# Patient Record
Sex: FEMALE | Race: WHITE | NOT HISPANIC OR LATINO | Employment: OTHER | URBAN - METROPOLITAN AREA
[De-identification: names, ages, dates, MRNs, and addresses within clinical notes are randomized per-mention and may not be internally consistent; named-entity substitution may affect disease eponyms.]

---

## 2018-09-17 DIAGNOSIS — Z12.11 SCREENING FOR COLON CANCER: Primary | ICD-10-CM

## 2018-09-26 ENCOUNTER — OUTSIDE FACILITY SERVICE (OUTPATIENT)
Dept: GASTROENTEROLOGY | Facility: CLINIC | Age: 63
End: 2018-09-26

## 2018-09-26 PROCEDURE — G0121 COLON CA SCRN NOT HI RSK IND: HCPCS | Performed by: INTERNAL MEDICINE

## 2018-11-02 ENCOUNTER — TRANSCRIBE ORDERS (OUTPATIENT)
Dept: ADMINISTRATIVE | Facility: HOSPITAL | Age: 63
End: 2018-11-02

## 2018-11-02 DIAGNOSIS — R07.89 OTHER CHEST PAIN: Primary | ICD-10-CM

## 2018-11-02 DIAGNOSIS — N64.4 BREAST TENDERNESS: ICD-10-CM

## 2018-11-07 ENCOUNTER — HOSPITAL ENCOUNTER (OUTPATIENT)
Dept: CT IMAGING | Facility: HOSPITAL | Age: 63
Discharge: HOME | End: 2018-11-07
Attending: INTERNAL MEDICINE | Admitting: INTERNAL MEDICINE

## 2018-11-07 DIAGNOSIS — R07.89 OTHER CHEST PAIN: ICD-10-CM

## 2018-11-07 DIAGNOSIS — N64.4 BREAST TENDERNESS: ICD-10-CM

## 2018-11-07 LAB — CREAT BLDA-MCNC: 0.9 MG/DL (ref 0.6–1.3)

## 2018-11-07 PROCEDURE — 25010000002 IOPAMIDOL 61 % SOLUTION: Performed by: INTERNAL MEDICINE

## 2018-11-07 PROCEDURE — 71270 CT THORAX DX C-/C+: CPT

## 2018-11-07 PROCEDURE — 82565 ASSAY OF CREATININE: CPT

## 2018-11-07 RX ADMIN — IOPAMIDOL 100 ML: 612 INJECTION, SOLUTION INTRAVENOUS at 10:53

## 2019-03-04 RX ORDER — ESCITALOPRAM OXALATE 10 MG/1
TABLET ORAL
Qty: 90 TABLET | Refills: 1 | Status: SHIPPED | OUTPATIENT
Start: 2019-03-04 | End: 2019-09-06 | Stop reason: SDUPTHER

## 2019-04-08 ENCOUNTER — OFFICE VISIT (OUTPATIENT)
Dept: INTERNAL MEDICINE | Facility: CLINIC | Age: 64
End: 2019-04-08

## 2019-04-08 ENCOUNTER — TELEPHONE (OUTPATIENT)
Dept: INTERNAL MEDICINE | Facility: CLINIC | Age: 64
End: 2019-04-08

## 2019-04-08 VITALS
WEIGHT: 245 LBS | SYSTOLIC BLOOD PRESSURE: 118 MMHG | DIASTOLIC BLOOD PRESSURE: 80 MMHG | HEART RATE: 84 BPM | TEMPERATURE: 98.5 F

## 2019-04-08 DIAGNOSIS — J40 BRONCHITIS: ICD-10-CM

## 2019-04-08 DIAGNOSIS — R05.9 COUGH: Primary | ICD-10-CM

## 2019-04-08 PROBLEM — R73.09 ABNORMAL GLUCOSE: Status: ACTIVE | Noted: 2019-04-08

## 2019-04-08 PROBLEM — E11.40 DIABETIC NEUROPATHY ASSOCIATED WITH TYPE 2 DIABETES MELLITUS (HCC): Status: ACTIVE | Noted: 2019-04-08

## 2019-04-08 PROBLEM — I10 BENIGN ESSENTIAL HYPERTENSION: Status: ACTIVE | Noted: 2019-04-08

## 2019-04-08 PROBLEM — E11.9 DIABETES MELLITUS: Status: ACTIVE | Noted: 2019-04-08

## 2019-04-08 PROBLEM — M25.521 RIGHT ELBOW PAIN: Status: ACTIVE | Noted: 2019-04-08

## 2019-04-08 PROBLEM — E66.01 MORBID OBESITY (HCC): Status: ACTIVE | Noted: 2019-04-08

## 2019-04-08 PROBLEM — E78.2 MIXED HYPERLIPIDEMIA: Status: ACTIVE | Noted: 2019-04-08

## 2019-04-08 PROBLEM — K21.9 GERD (GASTROESOPHAGEAL REFLUX DISEASE): Status: ACTIVE | Noted: 2019-04-08

## 2019-04-08 PROBLEM — I87.2 VENOUS (PERIPHERAL) INSUFFICIENCY: Status: ACTIVE | Noted: 2019-04-08

## 2019-04-08 PROBLEM — J30.9 ALLERGIC RHINITIS: Status: ACTIVE | Noted: 2019-04-08

## 2019-04-08 LAB
EXPIRATION DATE: NORMAL
FLUAV AG NPH QL: NEGATIVE
FLUBV AG NPH QL: NEGATIVE
INTERNAL CONTROL: NORMAL
Lab: NORMAL

## 2019-04-08 PROCEDURE — 99213 OFFICE O/P EST LOW 20 MIN: CPT | Performed by: NURSE PRACTITIONER

## 2019-04-08 PROCEDURE — 87804 INFLUENZA ASSAY W/OPTIC: CPT | Performed by: NURSE PRACTITIONER

## 2019-04-08 RX ORDER — DOCUSATE SODIUM 100 MG/1
1 CAPSULE, LIQUID FILLED ORAL DAILY PRN
Status: ON HOLD | COMMUNITY
End: 2019-12-03 | Stop reason: SDUPTHER

## 2019-04-08 RX ORDER — ALBUTEROL SULFATE 90 UG/1
2 AEROSOL, METERED RESPIRATORY (INHALATION) EVERY 4 HOURS PRN
Qty: 1 INHALER | Refills: 0 | Status: SHIPPED | OUTPATIENT
Start: 2019-04-08 | End: 2019-05-05 | Stop reason: SDUPTHER

## 2019-04-08 RX ORDER — BENZONATATE 100 MG/1
200 CAPSULE ORAL 3 TIMES DAILY PRN
Qty: 30 CAPSULE | Refills: 1 | Status: SHIPPED | OUTPATIENT
Start: 2019-04-08 | End: 2019-04-19 | Stop reason: SDUPTHER

## 2019-04-08 RX ORDER — LISINOPRIL 10 MG/1
10 TABLET ORAL DAILY
Refills: 3 | COMMUNITY
Start: 2019-01-23 | End: 2019-10-20 | Stop reason: SDUPTHER

## 2019-04-08 RX ORDER — MELATONIN
2 EVERY MORNING
COMMUNITY
Start: 2014-08-27

## 2019-04-08 RX ORDER — LORATADINE 10 MG/1
1 TABLET ORAL DAILY
COMMUNITY
Start: 2012-02-10

## 2019-04-08 RX ORDER — PANTOPRAZOLE SODIUM 40 MG/1
40 TABLET, DELAYED RELEASE ORAL 2 TIMES DAILY
Refills: 1 | COMMUNITY
Start: 2019-02-27 | End: 2019-05-27 | Stop reason: SDUPTHER

## 2019-04-08 RX ORDER — ATORVASTATIN CALCIUM 40 MG/1
1 TABLET, FILM COATED ORAL DAILY
Refills: 1 | COMMUNITY
Start: 2019-02-27 | End: 2019-05-27 | Stop reason: SDUPTHER

## 2019-04-08 RX ORDER — GUAIFENESIN AND DEXTROMETHORPHAN HYDROBROMIDE 1200; 60 MG/1; MG/1
TABLET, EXTENDED RELEASE ORAL AS NEEDED
COMMUNITY
Start: 2014-03-14

## 2019-04-08 NOTE — PROGRESS NOTES
Wallace Dukes  1955  [id number]  Patient Care Team:  Domingo Sood MD as PCP - General  Domingo Sood MD as PCP - Family Medicine    Wallace Dukes is a pleasant 63 y.o. female who presents for evaluation of URI (productive cough, congestion, low grade fever )      Chief Complaint   Patient presents with   • URI     productive cough, congestion, low grade fever        HPI:   Cough dry deep hacking, sore throat, bilat rib pain, dec energy and appetite fever. sudden onset Sat.  Non smoker, no lung dx. daytime Mucinex contains acetaminophen.  Started mucinex Sat.     Past Medical History:   Diagnosis Date   • Cancer of breast (CMS/HCC) 2014   • Chest pain 2009    stress nuclear   • MRSA (methicillin resistant Staphylococcus aureus)     infection of right breast expander; bilateral removal of expanders   • Schwannoma 06/2016    left translabyrinthine craniotomy       Past Surgical History:   Procedure Laterality Date   • BREAST RECONSTRUCTION, BREAST TISSUE EXPANDER INSERTION Bilateral 07/2013   • BREAST TISSUE EXPANDER REMOVAL INSERTION OF IMPLANT Bilateral     bilateral removal of expanders   • CRANIOTOMY FOR TUMOR  06/2016   • HYSTERECTOMY  1986   • MASTECTOMY Bilateral 2014       Family History   Problem Relation Age of Onset   • Hypertension Mother    • Diabetes Mother    • Migraines Mother    • Diabetes Sister    • Hypertension Brother    • Diabetes Son        Social History     Tobacco Use   Smoking Status Never Smoker   Smokeless Tobacco Never Used       No Known Allergies    Review of Systems   Constitutional: Positive for chills. Negative for fatigue and fever.   HENT: Positive for congestion. Negative for ear pain and sinus pressure.    Respiratory: Positive for cough and wheezing. Negative for chest tightness and shortness of breath.    Cardiovascular: Negative for chest pain and palpitations.   Gastrointestinal: Negative for abdominal pain, blood in stool and constipation.   Skin:  Negative for color change.   Allergic/Immunologic: Positive for environmental allergies.   Neurological: Negative for dizziness, speech difficulty and headache.   Psychiatric/Behavioral: Negative for decreased concentration. The patient is not nervous/anxious.        Vitals:    04/08/19 1635   BP: 118/80   BP Location: Left arm   Patient Position: Sitting   Cuff Size: Large Adult   Pulse: 84   Temp: 98.5 °F (36.9 °C)   TempSrc: Temporal   Weight: 111 kg (245 lb)   PainSc: 0-No pain         Current Outpatient Medications:   •  atorvastatin (LIPITOR) 40 MG tablet, Take 1 tablet by mouth Daily., Disp: , Rfl: 1  •  cholecalciferol (VITAMIN D3) 1000 units tablet, Take 3 tablets by mouth Every Morning., Disp: , Rfl:   •  dextromethorphan-guaifenesin (MUCINEX DM MAX STRENGTH)  MG per 12 hr tablet, Take 1 tablet by mouth Every 12 (Twelve) Hours., Disp: , Rfl:   •  docusate sodium (STOOL SOFTENER) 100 MG capsule, Take 1 capsule by mouth Daily As Needed., Disp: , Rfl:   •  escitalopram (LEXAPRO) 10 MG tablet, TAKE 1 TABLET BY ORAL ROUTE EVERY DAY, Disp: 90 tablet, Rfl: 1  •  lisinopril (PRINIVIL,ZESTRIL) 10 MG tablet, Take 10 mg by mouth Daily., Disp: , Rfl: 3  •  loratadine (CLARITIN) 10 MG tablet, Take 1 tablet by mouth Daily., Disp: , Rfl:   •  metFORMIN (GLUCOPHAGE) 500 MG tablet, TAKE 1 TABLET BY ORAL ROUTE 2 TIMES EVERY DAY WITH MORNING AND EVENING MEALS, Disp: 180 tablet, Rfl: 1  •  Multiple Vitamins-Minerals (MULTIVITAMIN ADULT PO), Take 1 tablet by mouth Daily., Disp: , Rfl:   •  pantoprazole (PROTONIX) 40 MG EC tablet, Take 40 mg by mouth 2 (Two) Times a Day., Disp: , Rfl: 1  •  albuterol sulfate  (90 Base) MCG/ACT inhaler, Inhale 2 puffs Every 4 (Four) Hours As Needed for Wheezing or Shortness of Air., Disp: 1 inhaler, Rfl: 0  •  benzonatate (TESSALON PERLES) 100 MG capsule, Take 2 capsules by mouth 3 (Three) Times a Day As Needed for Cough., Disp: 30 capsule, Rfl: 1    Physical Exam   Constitutional:  She appears well-developed and well-nourished.   HENT:   Head: Normocephalic and atraumatic.   Right Ear: External ear normal.   Left Ear: External ear normal.   Mouth/Throat: Oropharynx is clear and moist.   Eyes: Conjunctivae and EOM are normal.   Neck: Normal range of motion. Neck supple.   Cardiovascular: Normal rate, regular rhythm and normal heart sounds.   Pulmonary/Chest: Effort normal. She has decreased breath sounds in the right upper field and the right lower field. She has wheezes.   Abdominal: Soft. Bowel sounds are normal.   Musculoskeletal: Normal range of motion.   Lymphadenopathy:     She has no cervical adenopathy.   Neurological: She is alert.   Skin: Skin is warm and dry.   Psychiatric: She has a normal mood and affect. Her behavior is normal. Thought content normal.           Assessment/Plan:    Problem List Items Addressed This Visit     None      Visit Diagnoses     Cough    -  Primary    Relevant Orders    POC Influenza A / B (Completed)    Bronchitis        Relevant Medications    loratadine (CLARITIN) 10 MG tablet    dextromethorphan-guaifenesin (MUCINEX DM MAX STRENGTH)  MG per 12 hr tablet    albuterol sulfate  (90 Base) MCG/ACT inhaler    benzonatate (TESSALON PERLES) 100 MG capsule    Other Relevant Orders    XR Chest PA & Lateral          Plan of care reviewed with patient at the conclusion of today's visit. Education was provided regarding diagnosis, management and any prescribed or recommended OTC medications.  Patient verbalizes understanding of and agreement with management plan.    Return in about 4 days (around 4/12/2019).    *Note that portions of this note were completed with a voice recognition program.  Efforts were made to edit the dictation but occasionally words are transcribed.    MARVIN Pat

## 2019-04-08 NOTE — TELEPHONE ENCOUNTER
Patient complains of deep dry cough, sore rib area, sore throat and fever of 101.1 for 2 days.    Prefers not to come in

## 2019-04-09 ENCOUNTER — HOSPITAL ENCOUNTER (OUTPATIENT)
Dept: GENERAL RADIOLOGY | Facility: HOSPITAL | Age: 64
Discharge: HOME | End: 2019-04-09
Admitting: NURSE PRACTITIONER

## 2019-04-09 DIAGNOSIS — J40 BRONCHITIS: ICD-10-CM

## 2019-04-09 PROCEDURE — 71046 X-RAY EXAM CHEST 2 VIEWS: CPT

## 2019-04-12 ENCOUNTER — OFFICE VISIT (OUTPATIENT)
Dept: INTERNAL MEDICINE | Facility: CLINIC | Age: 64
End: 2019-04-12

## 2019-04-12 VITALS
WEIGHT: 244 LBS | SYSTOLIC BLOOD PRESSURE: 148 MMHG | TEMPERATURE: 98.5 F | DIASTOLIC BLOOD PRESSURE: 86 MMHG | HEART RATE: 80 BPM

## 2019-04-12 DIAGNOSIS — J40 BRONCHITIS: Primary | ICD-10-CM

## 2019-04-12 PROCEDURE — 96372 THER/PROPH/DIAG INJ SC/IM: CPT | Performed by: NURSE PRACTITIONER

## 2019-04-12 PROCEDURE — 99213 OFFICE O/P EST LOW 20 MIN: CPT | Performed by: NURSE PRACTITIONER

## 2019-04-12 RX ORDER — CLARITHROMYCIN 500 MG/1
500 TABLET, COATED ORAL 2 TIMES DAILY
Qty: 14 TABLET | Refills: 0 | Status: SHIPPED | OUTPATIENT
Start: 2019-04-12 | End: 2019-04-19

## 2019-04-12 RX ORDER — TRIAMCINOLONE ACETONIDE 40 MG/ML
80 INJECTION, SUSPENSION INTRA-ARTICULAR; INTRAMUSCULAR ONCE
Status: COMPLETED | OUTPATIENT
Start: 2019-04-12 | End: 2019-04-12

## 2019-04-12 RX ORDER — PREDNISONE 20 MG/1
20 TABLET ORAL DAILY
Qty: 5 TABLET | Refills: 0 | Status: SHIPPED | OUTPATIENT
Start: 2019-04-12 | End: 2019-04-19

## 2019-04-12 RX ORDER — TRIAMCINOLONE ACETONIDE 40 MG/ML
40 INJECTION, SUSPENSION INTRA-ARTICULAR; INTRAMUSCULAR ONCE
Status: DISCONTINUED | OUTPATIENT
Start: 2019-04-12 | End: 2019-04-12

## 2019-04-12 RX ADMIN — TRIAMCINOLONE ACETONIDE 80 MG: 40 INJECTION, SUSPENSION INTRA-ARTICULAR; INTRAMUSCULAR at 09:24

## 2019-04-12 NOTE — PROGRESS NOTES
Wallace Dukes  1955  [id number]  Patient Care Team:  Domingo Sood MD as PCP - General  Domingo Sood MD as PCP - Family Medicine    Wallace Dukes is a pleasant 63 y.o. female who presents for evaluation of Follow-up (4 day follow up on cough )      Chief Complaint   Patient presents with   • Follow-up     4 day follow up on cough        HPI:   Cough persists but is better, no fever, still having mucous in upper airway.  Using albuterol about 2 x day.  Using tessalon and claritin.    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       Family History   Problem Relation Age of Onset   • Hypertension Mother    • Diabetes Mother    • Migraines Mother    • Diabetes Sister    • Hypertension Brother    • Diabetes Son        Social History     Tobacco Use   Smoking Status Never Smoker   Smokeless Tobacco Never Used       No Known Allergies    Review of Systems   Constitutional: Negative for chills, fatigue and fever.   HENT: Positive for congestion. Negative for ear pain and sinus pressure.    Respiratory: Positive for cough and wheezing. Negative for chest tightness and shortness of breath.    Cardiovascular: Negative for chest pain and palpitations.   Gastrointestinal: Negative for abdominal pain, blood in stool and  constipation.   Skin: Negative for color change.   Allergic/Immunologic: Negative for environmental allergies.   Neurological: Negative for dizziness, speech difficulty and headache.   Psychiatric/Behavioral: Negative for decreased concentration. The patient is not nervous/anxious.        Vitals:    04/12/19 0905   BP: 148/86   BP Location: Left arm   Patient Position: Sitting   Cuff Size: Large Adult   Pulse: 80   Temp: 98.5 °F (36.9 °C)   TempSrc: Temporal   Weight: 111 kg (244 lb)   PainSc: 0-No pain         Current Outpatient Medications:   •  albuterol sulfate  (90 Base) MCG/ACT inhaler, Inhale 2 puffs Every 4 (Four) Hours As Needed for Wheezing or Shortness of Air., Disp: 1 inhaler, Rfl: 0  •  atorvastatin (LIPITOR) 40 MG tablet, Take 1 tablet by mouth Daily., Disp: , Rfl: 1  •  benzonatate (TESSALON PERLES) 100 MG capsule, Take 2 capsules by mouth 3 (Three) Times a Day As Needed for Cough., Disp: 30 capsule, Rfl: 1  •  cholecalciferol (VITAMIN D3) 1000 units tablet, Take 3 tablets by mouth Every Morning., Disp: , Rfl:   •  dextromethorphan-guaifenesin (MUCINEX DM MAX STRENGTH)  MG per 12 hr tablet, Take 1 tablet by mouth Every 12 (Twelve) Hours., Disp: , Rfl:   •  docusate sodium (STOOL SOFTENER) 100 MG capsule, Take 1 capsule by mouth Daily As Needed., Disp: , Rfl:   •  escitalopram (LEXAPRO) 10 MG tablet, TAKE 1 TABLET BY ORAL ROUTE EVERY DAY, Disp: 90 tablet, Rfl: 1  •  lisinopril (PRINIVIL,ZESTRIL) 10 MG tablet, Take 10 mg by mouth Daily., Disp: , Rfl: 3  •  loratadine (CLARITIN) 10 MG tablet, Take 1 tablet by mouth Daily., Disp: , Rfl:   •  metFORMIN (GLUCOPHAGE) 500 MG tablet, TAKE 1 TABLET BY ORAL ROUTE 2 TIMES EVERY DAY WITH MORNING AND EVENING MEALS, Disp: 180 tablet, Rfl: 1  •  Multiple Vitamins-Minerals (MULTIVITAMIN ADULT PO), Take 1 tablet by mouth Daily., Disp: , Rfl:   •  pantoprazole (PROTONIX) 40 MG EC tablet, Take 40 mg by mouth 2 (Two) Times a Day., Disp: , Rfl: 1  •   clarithromycin (BIAXIN) 500 MG tablet, Take 1 tablet by mouth 2 (Two) Times a Day., Disp: 14 tablet, Rfl: 0  •  predniSONE (DELTASONE) 20 MG tablet, Take 1 tablet by mouth Daily., Disp: 5 tablet, Rfl: 0    Current Facility-Administered Medications:   •  triamcinolone acetonide (KENALOG-40) injection 40 mg, 40 mg, Intramuscular, Once, Mei Roldan APRN    Physical Exam   Constitutional: She appears well-developed and well-nourished.   HENT:   Head: Normocephalic and atraumatic.   Right Ear: External ear normal.   Left Ear: External ear normal.   Mouth/Throat: Oropharynx is clear and moist.   Eyes: Conjunctivae and EOM are normal.   Neck: Normal range of motion. Neck supple.   Cardiovascular: Normal rate, regular rhythm and normal heart sounds.   Pulmonary/Chest: Effort normal. She has wheezes. She has rhonchi in the right upper field, the right lower field, the left upper field and the left lower field.   Abdominal: Soft. Bowel sounds are normal.   Musculoskeletal: Normal range of motion.   Lymphadenopathy:     She has no cervical adenopathy.   Neurological: She is alert.   Skin: Skin is warm and dry.   Psychiatric: She has a normal mood and affect. Her behavior is normal. Thought content normal.       Results Review:    I reviewed the patient's new clinical results.  CXR    Assessment/Plan:    Problem List Items Addressed This Visit     None      Visit Diagnoses     Bronchitis    -  Primary    Relevant Medications    triamcinolone acetonide (KENALOG-40) injection 40 mg (Start on 4/12/2019 10:15 AM)        Use albuterol 2 puffs every 4-6 hours as needed for wheezing, shortness of breath, chest tightness or excessive coughing.  Start biaxin as directed and use prednisone for 5 days as directed.  Continue tessalon, claritin, mucinex.    Plan of care reviewed with patient at the conclusion of today's visit. Education was provided regarding diagnosis, management and any prescribed or recommended OTC medications.   Patient verbalizes understanding of and agreement with management plan.    Return in about 1 week (around 4/19/2019).    *Note that portions of this note were completed with a voice recognition program.  Efforts were made to edit the dictation but occasionally words are transcribed.    MARVIN Pat

## 2019-04-12 NOTE — PATIENT INSTRUCTIONS
Use albuterol 2 puffs every 4-6 hours as needed for wheezing, shortness of breath, chest tightness or excessive coughing.  Start biaxin as directed and use prednisone for 5 days as directed.  Continue tessalon, claritin, mucinex.

## 2019-04-19 ENCOUNTER — OFFICE VISIT (OUTPATIENT)
Dept: INTERNAL MEDICINE | Facility: CLINIC | Age: 64
End: 2019-04-19

## 2019-04-19 VITALS
HEIGHT: 65 IN | OXYGEN SATURATION: 93 % | HEART RATE: 80 BPM | SYSTOLIC BLOOD PRESSURE: 136 MMHG | DIASTOLIC BLOOD PRESSURE: 88 MMHG | BODY MASS INDEX: 40.48 KG/M2 | TEMPERATURE: 98.4 F | WEIGHT: 243 LBS

## 2019-04-19 DIAGNOSIS — J40 BRONCHITIS: Primary | ICD-10-CM

## 2019-04-19 PROCEDURE — 99213 OFFICE O/P EST LOW 20 MIN: CPT | Performed by: NURSE PRACTITIONER

## 2019-04-19 RX ORDER — BENZONATATE 100 MG/1
200 CAPSULE ORAL 3 TIMES DAILY PRN
Qty: 30 CAPSULE | Refills: 1 | Status: SHIPPED | OUTPATIENT
Start: 2019-04-19 | End: 2019-05-08

## 2019-04-19 RX ORDER — BUDESONIDE AND FORMOTEROL FUMARATE DIHYDRATE 80; 4.5 UG/1; UG/1
2 AEROSOL RESPIRATORY (INHALATION)
Qty: 1 INHALER | Refills: 12 | COMMUNITY
Start: 2019-04-19 | End: 2019-05-08

## 2019-04-19 NOTE — PROGRESS NOTES
Wallace Dukes  1955  [id number]  Patient Care Team:  Domingo Sood MD as PCP - General  Domingo Sood MD as PCP - Family Medicine    Wallace Dukes is a pleasant 63 y.o. female who presents for evaluation of URI and Cough    Chief Complaint   Patient presents with   • URI   • Cough       HPI:   Still coughing overall better (50% compared with last week).  Still on claritin, tessalon, mucinex albuterol (se jittery, lightheaded).  Finished prednisone, biaxin, had kenalog last week.  Albuterol makes her jittery, but she can tolerate it.    Left sided wheezing, left anterior upper abd pain with coughing  Past Medical History:   Diagnosis Date   • Cancer of breast (CMS/HCC) 2014   • Chest pain 2009    stress nuclear   • MRSA (methicillin resistant Staphylococcus aureus)     infection of right breast expander; bilateral removal of expanders   • Schwannoma 06/2016    left translabyrinthine craniotomy       Past Surgical History:   Procedure Laterality Date   • BREAST RECONSTRUCTION, BREAST TISSUE EXPANDER INSERTION Bilateral 07/2013   • BREAST TISSUE EXPANDER REMOVAL INSERTION OF IMPLANT Bilateral     bilateral removal of expanders   • CRANIOTOMY FOR TUMOR  06/2016    Left Translabyrithine craniotomy   • HYSTERECTOMY  1986   • MASTECTOMY Bilateral 2014    complicated by MRSA wound infection on right requiring removal of expander.       Family History   Problem Relation Age of Onset   • Hypertension Mother    • Diabetes Mother    • Migraines Mother    • Diabetes Sister    • Hypertension Brother    • Diabetes Son        Social History     Tobacco Use   Smoking Status Never Smoker   Smokeless Tobacco Never Used       No Known Allergies    Review of Systems   Constitutional: Negative for chills, fatigue and fever.   HENT: Positive for congestion. Negative for ear pain and sinus pressure.    Respiratory: Positive for cough and wheezing. Negative for chest tightness and shortness of breath.   "  Cardiovascular: Negative for chest pain and palpitations.   Gastrointestinal: Negative for abdominal pain, blood in stool and constipation.   Skin: Negative for color change.   Allergic/Immunologic: Negative for environmental allergies.   Neurological: Negative for dizziness, speech difficulty and headache.   Psychiatric/Behavioral: Negative for decreased concentration. The patient is not nervous/anxious.        Vitals:    04/19/19 0849   BP: 136/88   BP Location: Left arm   Patient Position: Sitting   Cuff Size: Adult   Pulse: 80   Temp: 98.4 °F (36.9 °C)   SpO2: 93%  Comment: Room air   Weight: 110 kg (243 lb)   Height: 163.8 cm (64.5\")         Current Outpatient Medications:   •  albuterol sulfate  (90 Base) MCG/ACT inhaler, Inhale 2 puffs Every 4 (Four) Hours As Needed for Wheezing or Shortness of Air., Disp: 1 inhaler, Rfl: 0  •  atorvastatin (LIPITOR) 40 MG tablet, Take 1 tablet by mouth Daily., Disp: , Rfl: 1  •  benzonatate (TESSALON PERLES) 100 MG capsule, Take 2 capsules by mouth 3 (Three) Times a Day As Needed for Cough., Disp: 30 capsule, Rfl: 1  •  cholecalciferol (VITAMIN D3) 1000 units tablet, Take 3 tablets by mouth Every Morning., Disp: , Rfl:   •  dextromethorphan-guaifenesin (MUCINEX DM MAX STRENGTH)  MG per 12 hr tablet, Take 1 tablet by mouth Every 12 (Twelve) Hours., Disp: , Rfl:   •  docusate sodium (STOOL SOFTENER) 100 MG capsule, Take 1 capsule by mouth Daily As Needed., Disp: , Rfl:   •  escitalopram (LEXAPRO) 10 MG tablet, TAKE 1 TABLET BY ORAL ROUTE EVERY DAY, Disp: 90 tablet, Rfl: 1  •  lisinopril (PRINIVIL,ZESTRIL) 10 MG tablet, Take 10 mg by mouth Daily., Disp: , Rfl: 3  •  loratadine (CLARITIN) 10 MG tablet, Take 1 tablet by mouth Daily., Disp: , Rfl:   •  metFORMIN (GLUCOPHAGE) 500 MG tablet, TAKE 1 TABLET BY ORAL ROUTE 2 TIMES EVERY DAY WITH MORNING AND EVENING MEALS, Disp: 180 tablet, Rfl: 1  •  Multiple Vitamins-Minerals (MULTIVITAMIN ADULT PO), Take 1 tablet by " mouth Daily., Disp: , Rfl:   •  pantoprazole (PROTONIX) 40 MG EC tablet, Take 40 mg by mouth 2 (Two) Times a Day., Disp: , Rfl: 1  •  budesonide-formoterol (SYMBICORT) 80-4.5 MCG/ACT inhaler, Inhale 2 puffs 2 (Two) Times a Day., Disp: 1 inhaler, Rfl: 12    Physical Exam   Constitutional: She appears well-developed and well-nourished.   HENT:   Head: Normocephalic and atraumatic.   Right Ear: External ear normal.   Left Ear: External ear normal.   Mouth/Throat: Oropharynx is clear and moist.   Eyes: Conjunctivae and EOM are normal.   Neck: Normal range of motion. Neck supple.   Cardiovascular: Normal rate, regular rhythm and normal heart sounds.   Pulmonary/Chest: Effort normal. She has wheezes in the left upper field and the left lower field.   Abdominal: Soft. Bowel sounds are normal.   Musculoskeletal: Normal range of motion.   Lymphadenopathy:     She has no cervical adenopathy.   Neurological: She is alert.   Skin: Skin is warm and dry.   Psychiatric: She has a normal mood and affect. Her behavior is normal. Thought content normal.       Results Review:    I reviewed the patient's new clinical results.    Assessment/Plan:    Problem List Items Addressed This Visit     None      Visit Diagnoses     Bronchitis    -  Primary    Relevant Medications    benzonatate (TESSALON PERLES) 100 MG capsule    budesonide-formoterol (SYMBICORT) 80-4.5 MCG/ACT inhaler      Use albuterol 2 puffs every 4-6 hours as needed for wheezing, shortness of breath, chest tightness or excessive coughing.  Add Symbicort 2 puffs twice a day.      Plan of care reviewed with patient at the conclusion of today's visit. Education was provided regarding diagnosis, management and any prescribed or recommended OTC medications.  Patient verbalizes understanding of and agreement with management plan.    Return in about 1 week (around 4/26/2019).    *Note that portions of this note were completed with a voice recognition program.  Efforts were made to  edit the dictation but occasionally words are transcribed.    Mei Roldan, APRN

## 2019-04-19 NOTE — PATIENT INSTRUCTIONS
Use albuterol 2 puffs every 4-6 hours as needed for wheezing, shortness of breath, chest tightness or excessive coughing.  Add Symbicort 2 puffs twice a day.

## 2019-05-03 ENCOUNTER — PATIENT MESSAGE (OUTPATIENT)
Dept: INTERNAL MEDICINE | Facility: CLINIC | Age: 64
End: 2019-05-03

## 2019-05-03 DIAGNOSIS — E78.2 MIXED HYPERLIPIDEMIA: ICD-10-CM

## 2019-05-03 DIAGNOSIS — E11.42 DIABETIC POLYNEUROPATHY ASSOCIATED WITH TYPE 2 DIABETES MELLITUS (HCC): ICD-10-CM

## 2019-05-03 DIAGNOSIS — I10 BENIGN ESSENTIAL HYPERTENSION: Primary | ICD-10-CM

## 2019-05-03 NOTE — TELEPHONE ENCOUNTER
From: Wallace Dukes  To: Domingo Sood MD  Sent: 5/3/2019 2:52 PM EDT  Subject: Non-Urgent Medical Question    I would like to come in Monday May 6 to have blood drawn in preparation for my May 8 appointment. Can you need to submit the request for things to be tested Thank you. Barbara

## 2019-05-06 ENCOUNTER — LAB (OUTPATIENT)
Dept: LAB | Facility: HOSPITAL | Age: 64
End: 2019-05-06

## 2019-05-06 DIAGNOSIS — E78.2 MIXED HYPERLIPIDEMIA: ICD-10-CM

## 2019-05-06 DIAGNOSIS — E11.42 DIABETIC POLYNEUROPATHY ASSOCIATED WITH TYPE 2 DIABETES MELLITUS (HCC): ICD-10-CM

## 2019-05-06 DIAGNOSIS — I10 BENIGN ESSENTIAL HYPERTENSION: ICD-10-CM

## 2019-05-06 LAB
ALBUMIN SERPL-MCNC: 4.1 G/DL (ref 3.5–5.2)
ALBUMIN/GLOB SERPL: 1.3 G/DL
ALP SERPL-CCNC: 122 U/L (ref 39–117)
ALT SERPL W P-5'-P-CCNC: 22 U/L (ref 1–33)
ANION GAP SERPL CALCULATED.3IONS-SCNC: 12.3 MMOL/L
AST SERPL-CCNC: 19 U/L (ref 1–32)
BASOPHILS # BLD AUTO: 0.07 10*3/MM3 (ref 0–0.2)
BASOPHILS NFR BLD AUTO: 0.7 % (ref 0–1.5)
BILIRUB SERPL-MCNC: 0.5 MG/DL (ref 0.2–1.2)
BUN BLD-MCNC: 22 MG/DL (ref 8–23)
BUN/CREAT SERPL: 21.2 (ref 7–25)
CALCIUM SPEC-SCNC: 9.5 MG/DL (ref 8.6–10.5)
CHLORIDE SERPL-SCNC: 99 MMOL/L (ref 98–107)
CHOLEST SERPL-MCNC: 233 MG/DL (ref 0–200)
CO2 SERPL-SCNC: 26.7 MMOL/L (ref 22–29)
CREAT BLD-MCNC: 1.04 MG/DL (ref 0.57–1)
DEPRECATED RDW RBC AUTO: 47.2 FL (ref 37–54)
EOSINOPHIL # BLD AUTO: 0.18 10*3/MM3 (ref 0–0.4)
EOSINOPHIL NFR BLD AUTO: 1.8 % (ref 0.3–6.2)
ERYTHROCYTE [DISTWIDTH] IN BLOOD BY AUTOMATED COUNT: 13.5 % (ref 12.3–15.4)
GFR SERPL CREATININE-BSD FRML MDRD: 53 ML/MIN/1.73
GLOBULIN UR ELPH-MCNC: 3.1 GM/DL
GLUCOSE BLD-MCNC: 112 MG/DL (ref 65–99)
HBA1C MFR BLD: 7.1 % (ref 4.8–5.6)
HCT VFR BLD AUTO: 44.6 % (ref 34–46.6)
HDLC SERPL-MCNC: 68 MG/DL (ref 40–60)
HGB BLD-MCNC: 14.2 G/DL (ref 12–15.9)
IMM GRANULOCYTES # BLD AUTO: 0.06 10*3/MM3 (ref 0–0.05)
IMM GRANULOCYTES NFR BLD AUTO: 0.6 % (ref 0–0.5)
LDLC SERPL CALC-MCNC: 135 MG/DL (ref 0–100)
LDLC/HDLC SERPL: 1.99 {RATIO}
LYMPHOCYTES # BLD AUTO: 2.64 10*3/MM3 (ref 0.7–3.1)
LYMPHOCYTES NFR BLD AUTO: 25.8 % (ref 19.6–45.3)
MCH RBC QN AUTO: 30.3 PG (ref 26.6–33)
MCHC RBC AUTO-ENTMCNC: 31.8 G/DL (ref 31.5–35.7)
MCV RBC AUTO: 95.3 FL (ref 79–97)
MONOCYTES # BLD AUTO: 0.5 10*3/MM3 (ref 0.1–0.9)
MONOCYTES NFR BLD AUTO: 4.9 % (ref 5–12)
NEUTROPHILS # BLD AUTO: 6.78 10*3/MM3 (ref 1.7–7)
NEUTROPHILS NFR BLD AUTO: 66.2 % (ref 42.7–76)
NRBC BLD AUTO-RTO: 0 /100 WBC (ref 0–0.2)
PLATELET # BLD AUTO: 406 10*3/MM3 (ref 140–450)
PMV BLD AUTO: 9.8 FL (ref 6–12)
POTASSIUM BLD-SCNC: 4.7 MMOL/L (ref 3.5–5.2)
PROT SERPL-MCNC: 7.2 G/DL (ref 6–8.5)
RBC # BLD AUTO: 4.68 10*6/MM3 (ref 3.77–5.28)
SODIUM BLD-SCNC: 138 MMOL/L (ref 136–145)
TRIGL SERPL-MCNC: 150 MG/DL (ref 0–150)
VLDLC SERPL-MCNC: 30 MG/DL (ref 5–40)
WBC NRBC COR # BLD: 10.23 10*3/MM3 (ref 3.4–10.8)

## 2019-05-06 PROCEDURE — 80061 LIPID PANEL: CPT

## 2019-05-06 PROCEDURE — 85025 COMPLETE CBC W/AUTO DIFF WBC: CPT

## 2019-05-06 PROCEDURE — 83036 HEMOGLOBIN GLYCOSYLATED A1C: CPT

## 2019-05-06 PROCEDURE — 80053 COMPREHEN METABOLIC PANEL: CPT

## 2019-05-06 PROCEDURE — 82043 UR ALBUMIN QUANTITATIVE: CPT

## 2019-05-06 PROCEDURE — 36415 COLL VENOUS BLD VENIPUNCTURE: CPT

## 2019-05-06 PROCEDURE — 82570 ASSAY OF URINE CREATININE: CPT

## 2019-05-06 RX ORDER — ALBUTEROL SULFATE 90 UG/1
2 AEROSOL, METERED RESPIRATORY (INHALATION) EVERY 4 HOURS PRN
Qty: 18 INHALER | Refills: 3 | Status: SHIPPED | OUTPATIENT
Start: 2019-05-06 | End: 2021-05-13 | Stop reason: HOSPADM

## 2019-05-07 LAB
ALBUMIN UR-MCNC: <1.2 MG/L
CREAT UR-MCNC: 128.6 MG/DL
MICROALBUMIN/CREAT UR: NORMAL MG/G

## 2019-05-08 ENCOUNTER — OFFICE VISIT (OUTPATIENT)
Dept: INTERNAL MEDICINE | Facility: CLINIC | Age: 64
End: 2019-05-08

## 2019-05-08 VITALS
HEART RATE: 68 BPM | DIASTOLIC BLOOD PRESSURE: 84 MMHG | HEIGHT: 64 IN | SYSTOLIC BLOOD PRESSURE: 152 MMHG | BODY MASS INDEX: 41.83 KG/M2 | WEIGHT: 245 LBS

## 2019-05-08 DIAGNOSIS — E66.01 MORBID OBESITY (HCC): ICD-10-CM

## 2019-05-08 DIAGNOSIS — E11.42 DIABETIC POLYNEUROPATHY ASSOCIATED WITH TYPE 2 DIABETES MELLITUS (HCC): Primary | ICD-10-CM

## 2019-05-08 DIAGNOSIS — E78.2 MIXED HYPERLIPIDEMIA: ICD-10-CM

## 2019-05-08 DIAGNOSIS — F32.4 MAJOR DEPRESSIVE DISORDER WITH SINGLE EPISODE, IN PARTIAL REMISSION (HCC): ICD-10-CM

## 2019-05-08 DIAGNOSIS — I10 BENIGN ESSENTIAL HYPERTENSION: ICD-10-CM

## 2019-05-08 PROCEDURE — 90472 IMMUNIZATION ADMIN EACH ADD: CPT | Performed by: INTERNAL MEDICINE

## 2019-05-08 PROCEDURE — 90471 IMMUNIZATION ADMIN: CPT | Performed by: INTERNAL MEDICINE

## 2019-05-08 PROCEDURE — 90632 HEPA VACCINE ADULT IM: CPT | Performed by: INTERNAL MEDICINE

## 2019-05-08 PROCEDURE — 99214 OFFICE O/P EST MOD 30 MIN: CPT | Performed by: INTERNAL MEDICINE

## 2019-05-08 PROCEDURE — 90670 PCV13 VACCINE IM: CPT | Performed by: INTERNAL MEDICINE

## 2019-05-08 NOTE — PROGRESS NOTES
[facility]     Wallace Dukes  1955   [id number]      Patient Care Team:  Domingo Sood MD as PCP - General  Domingo Sood MD as PCP - Family Medicine    Chief Complaint::   Chief Complaint   Patient presents with   • Hyperlipidemia   • Hypertension   • Diabetes   • Obesity   • Heartburn        HPI  Mrs. Wynn is now 63.  She comes in for follow-up of her diabetes with neuropathy, hypertension, hyperlipidemia, obesity and depression.  She is doing reasonably well.  She is now retired although it was not her choice and rather sudden.  She hopes that she will be able to improve her physical activity and diet now that she is retired.  Her mood is good on her current medication, and her blood pressure is controlled at home.  He has recently noticed some numbness and tingling in her feet.    Chronic Conditions:      Patient Active Problem List   Diagnosis   • GERD (gastroesophageal reflux disease)   • Diabetic neuropathy associated with type 2 diabetes mellitus (CMS/HCC)   • Benign essential hypertension   • Venous (peripheral) insufficiency   • Mixed hyperlipidemia   • Allergic rhinitis   • Morbid obesity (CMS/HCC)   • Right elbow pain   • Diabetes mellitus (CMS/HCC)        Past Medical History:   Diagnosis Date   • Cancer of breast (CMS/HCC) 2014   • Chest pain 2009    stress nuclear   • MRSA (methicillin resistant Staphylococcus aureus)     infection of right breast expander; bilateral removal of expanders   • Schwannoma 06/2016    left translabyrinthine craniotomy       Past Surgical History:   Procedure Laterality Date   • BREAST RECONSTRUCTION, BREAST TISSUE EXPANDER INSERTION Bilateral 07/2013   • BREAST TISSUE EXPANDER REMOVAL INSERTION OF IMPLANT Bilateral     bilateral removal of expanders   • CRANIOTOMY FOR TUMOR  06/2016    Left Translabyrithine craniotomy   • HYSTERECTOMY  1986   • MASTECTOMY Bilateral 2014    complicated by MRSA wound infection on right requiring  removal of expander.       Family History   Problem Relation Age of Onset   • Hypertension Mother    • Diabetes Mother    • Migraines Mother    • Diabetes Sister    • Hypertension Brother    • Diabetes Son        Social History     Socioeconomic History   • Marital status:      Spouse name: Not on file   • Number of children: Not on file   • Years of education: Not on file   • Highest education level: Not on file   Tobacco Use   • Smoking status: Never Smoker   • Smokeless tobacco: Never Used   Substance and Sexual Activity   • Alcohol use: No     Frequency: Never   • Drug use: No   • Sexual activity: Defer       No Known Allergies      Current Outpatient Medications:   •  ALBUTEROL SULFATE  (90 Base) MCG/ACT inhaler, INHALE 2 PUFFS EVERY 4 (FOUR) HOURS AS NEEDED FOR WHEEZING OR SHORTNESS OF AIR., Disp: 18 inhaler, Rfl: 3  •  atorvastatin (LIPITOR) 40 MG tablet, Take 1 tablet by mouth Daily., Disp: , Rfl: 1  •  cholecalciferol (VITAMIN D3) 1000 units tablet, Take 3 tablets by mouth Every Morning., Disp: , Rfl:   •  dextromethorphan-guaifenesin (MUCINEX DM MAX STRENGTH)  MG per 12 hr tablet, Take 1 tablet by mouth Every 12 (Twelve) Hours., Disp: , Rfl:   •  docusate sodium (STOOL SOFTENER) 100 MG capsule, Take 1 capsule by mouth Daily As Needed., Disp: , Rfl:   •  escitalopram (LEXAPRO) 10 MG tablet, TAKE 1 TABLET BY ORAL ROUTE EVERY DAY, Disp: 90 tablet, Rfl: 1  •  lisinopril (PRINIVIL,ZESTRIL) 10 MG tablet, Take 10 mg by mouth Daily., Disp: , Rfl: 3  •  loratadine (CLARITIN) 10 MG tablet, Take 1 tablet by mouth Daily., Disp: , Rfl:   •  metFORMIN (GLUCOPHAGE) 500 MG tablet, TAKE 1 TABLET BY ORAL ROUTE 2 TIMES EVERY DAY WITH MORNING AND EVENING MEALS, Disp: 180 tablet, Rfl: 1  •  Multiple Vitamins-Minerals (MULTIVITAMIN ADULT PO), Take 1 tablet by mouth Daily., Disp: , Rfl:   •  pantoprazole (PROTONIX) 40 MG EC tablet, Take 40 mg by mouth 2 (Two) Times a Day., Disp: , Rfl: 1    Review of Systems  "  Constitutional: Negative for chills, fatigue and fever.   HENT: Negative for congestion, ear pain and sinus pressure.    Respiratory: Positive for cough. Negative for chest tightness, shortness of breath and wheezing.    Cardiovascular: Negative for chest pain and palpitations.   Gastrointestinal: Negative for abdominal pain, blood in stool and constipation.   Skin: Negative for color change.   Allergic/Immunologic: Negative for environmental allergies.   Neurological: Negative for dizziness, speech difficulty and headache.   Psychiatric/Behavioral: Negative for decreased concentration. The patient is not nervous/anxious.         Vital Signs  Vitals:    05/08/19 1423   BP: 152/84   BP Location: Left arm   Patient Position: Sitting   Cuff Size: Adult   Pulse: 68   Weight: 111 kg (245 lb)   Height: 163.8 cm (64.49\")       Physical Exam   Constitutional: She is oriented to person, place, and time. She appears well-developed and well-nourished.   HENT:   Head: Normocephalic and atraumatic.   Cardiovascular: Normal rate, regular rhythm and normal heart sounds.   No murmur heard.  Pulmonary/Chest: Effort normal and breath sounds normal.   Neurological: She is alert and oriented to person, place, and time.   Psychiatric: She has a normal mood and affect.   Vitals reviewed.     Procedures    ACE III MINI             Assessment/Plan:    Wallace was seen today for hyperlipidemia, hypertension, diabetes, obesity and heartburn.    Diagnoses and all orders for this visit:    Diabetic polyneuropathy associated with type 2 diabetes mellitus (CMS/HCC)    Benign essential hypertension    Mixed hyperlipidemia    Morbid obesity (CMS/HCC)    Major depressive disorder with single episode, in partial remission (CMS/HCC)    Other orders  -     Pneumococcal Conjugate Vaccine 13-Valent All  -     Hepatitis A Vaccine Adult IM    Plan    A1c is reasonably well controlled at 7.10.  She will continue efforts at losing weight, and hopefully " improve them, and continue metformin.    Pressure is slightly elevated today.  She will continue lisinopril and monitor her blood pressure.  5 to 10 pound weight loss could make a big difference.    BMI remains extremely elevated at 41.42 consistent with morbid obesity.  We discussed at length the need for her to improve her diet, to reduce calories, to improve her physical activity and lose weight.    GFR is low at 53, suggesting that she also has diabetic nephropathy.  This GFR is consistent with stage III chronic kidney disease.    LDL is slightly elevated at 135, HDL at a good range at 68 and triglycerides only slightly elevated at 150.    Mood is well controlled on escitalopram.    Plan of care reviewed with patient at the conclusion of today's visit. Education was provided regarding diagnosis, management, and any prescribed or recommended OTC medications.Patient verbalizes understanding of and agreement with management plan.         Domingo Sood MD

## 2019-05-28 RX ORDER — PANTOPRAZOLE SODIUM 40 MG/1
TABLET, DELAYED RELEASE ORAL
Qty: 180 TABLET | Refills: 1 | Status: SHIPPED | OUTPATIENT
Start: 2019-05-28 | End: 2019-11-25 | Stop reason: SDUPTHER

## 2019-05-28 RX ORDER — ATORVASTATIN CALCIUM 40 MG/1
TABLET, FILM COATED ORAL
Qty: 90 TABLET | Refills: 1 | Status: SHIPPED | OUTPATIENT
Start: 2019-05-28 | End: 2019-11-25 | Stop reason: SDUPTHER

## 2019-09-06 RX ORDER — ESCITALOPRAM OXALATE 10 MG/1
TABLET ORAL
Qty: 90 TABLET | Refills: 1 | Status: SHIPPED | OUTPATIENT
Start: 2019-09-06 | End: 2020-03-06

## 2019-10-21 RX ORDER — LISINOPRIL 10 MG/1
TABLET ORAL
Qty: 90 TABLET | Refills: 1 | Status: SHIPPED | OUTPATIENT
Start: 2019-10-21 | End: 2019-12-03 | Stop reason: HOSPADM

## 2019-11-05 ENCOUNTER — PATIENT MESSAGE (OUTPATIENT)
Dept: INTERNAL MEDICINE | Facility: CLINIC | Age: 64
End: 2019-11-05

## 2019-11-05 DIAGNOSIS — E11.42 DIABETIC POLYNEUROPATHY ASSOCIATED WITH TYPE 2 DIABETES MELLITUS (HCC): ICD-10-CM

## 2019-11-05 DIAGNOSIS — I10 BENIGN ESSENTIAL HYPERTENSION: Primary | ICD-10-CM

## 2019-11-05 DIAGNOSIS — E78.2 MIXED HYPERLIPIDEMIA: ICD-10-CM

## 2019-11-07 ENCOUNTER — LAB (OUTPATIENT)
Dept: LAB | Facility: HOSPITAL | Age: 64
End: 2019-11-07

## 2019-11-07 DIAGNOSIS — E11.42 DIABETIC POLYNEUROPATHY ASSOCIATED WITH TYPE 2 DIABETES MELLITUS (HCC): ICD-10-CM

## 2019-11-07 DIAGNOSIS — E78.2 MIXED HYPERLIPIDEMIA: ICD-10-CM

## 2019-11-07 DIAGNOSIS — I10 BENIGN ESSENTIAL HYPERTENSION: ICD-10-CM

## 2019-11-07 LAB
ALBUMIN SERPL-MCNC: 4.3 G/DL (ref 3.5–5.2)
ALBUMIN UR-MCNC: <1.2 MG/DL
ALBUMIN/GLOB SERPL: 1.2 G/DL
ALP SERPL-CCNC: 128 U/L (ref 39–117)
ALT SERPL W P-5'-P-CCNC: 54 U/L (ref 1–33)
ANION GAP SERPL CALCULATED.3IONS-SCNC: 13 MMOL/L (ref 5–15)
AST SERPL-CCNC: 37 U/L (ref 1–32)
BASOPHILS # BLD AUTO: 0.08 10*3/MM3 (ref 0–0.2)
BASOPHILS NFR BLD AUTO: 0.7 % (ref 0–1.5)
BILIRUB SERPL-MCNC: 0.5 MG/DL (ref 0.2–1.2)
BUN BLD-MCNC: 13 MG/DL (ref 8–23)
BUN/CREAT SERPL: 12.6 (ref 7–25)
CALCIUM SPEC-SCNC: 10.2 MG/DL (ref 8.6–10.5)
CHLORIDE SERPL-SCNC: 100 MMOL/L (ref 98–107)
CHOLEST SERPL-MCNC: 218 MG/DL (ref 0–200)
CO2 SERPL-SCNC: 27 MMOL/L (ref 22–29)
CREAT BLD-MCNC: 1.03 MG/DL (ref 0.57–1)
CREAT UR-MCNC: 39.3 MG/DL
DEPRECATED RDW RBC AUTO: 42.2 FL (ref 37–54)
EOSINOPHIL # BLD AUTO: 0.2 10*3/MM3 (ref 0–0.4)
EOSINOPHIL NFR BLD AUTO: 1.8 % (ref 0.3–6.2)
ERYTHROCYTE [DISTWIDTH] IN BLOOD BY AUTOMATED COUNT: 13.1 % (ref 12.3–15.4)
GFR SERPL CREATININE-BSD FRML MDRD: 54 ML/MIN/1.73
GLOBULIN UR ELPH-MCNC: 3.6 GM/DL
GLUCOSE BLD-MCNC: 93 MG/DL (ref 65–99)
HBA1C MFR BLD: 7.8 % (ref 4.8–5.6)
HCT VFR BLD AUTO: 41.8 % (ref 34–46.6)
HDLC SERPL-MCNC: 57 MG/DL (ref 40–60)
HGB BLD-MCNC: 14.6 G/DL (ref 12–15.9)
IMM GRANULOCYTES # BLD AUTO: 0.07 10*3/MM3 (ref 0–0.05)
IMM GRANULOCYTES NFR BLD AUTO: 0.6 % (ref 0–0.5)
LDLC SERPL CALC-MCNC: 107 MG/DL (ref 0–100)
LDLC/HDLC SERPL: 1.88 {RATIO}
LYMPHOCYTES # BLD AUTO: 3.5 10*3/MM3 (ref 0.7–3.1)
LYMPHOCYTES NFR BLD AUTO: 31.4 % (ref 19.6–45.3)
MCH RBC QN AUTO: 31.4 PG (ref 26.6–33)
MCHC RBC AUTO-ENTMCNC: 34.9 G/DL (ref 31.5–35.7)
MCV RBC AUTO: 89.9 FL (ref 79–97)
MICROALBUMIN/CREAT UR: NORMAL MG/G{CREAT}
MONOCYTES # BLD AUTO: 0.73 10*3/MM3 (ref 0.1–0.9)
MONOCYTES NFR BLD AUTO: 6.6 % (ref 5–12)
NEUTROPHILS # BLD AUTO: 6.56 10*3/MM3 (ref 1.7–7)
NEUTROPHILS NFR BLD AUTO: 58.9 % (ref 42.7–76)
NRBC BLD AUTO-RTO: 0 /100 WBC (ref 0–0.2)
PLATELET # BLD AUTO: 442 10*3/MM3 (ref 140–450)
PMV BLD AUTO: 9.5 FL (ref 6–12)
POTASSIUM BLD-SCNC: 4.5 MMOL/L (ref 3.5–5.2)
PROT SERPL-MCNC: 7.9 G/DL (ref 6–8.5)
RBC # BLD AUTO: 4.65 10*6/MM3 (ref 3.77–5.28)
SODIUM BLD-SCNC: 140 MMOL/L (ref 136–145)
TRIGL SERPL-MCNC: 270 MG/DL (ref 0–150)
VLDLC SERPL-MCNC: 54 MG/DL (ref 5–40)
WBC NRBC COR # BLD: 11.14 10*3/MM3 (ref 3.4–10.8)

## 2019-11-07 PROCEDURE — 82570 ASSAY OF URINE CREATININE: CPT

## 2019-11-07 PROCEDURE — 83036 HEMOGLOBIN GLYCOSYLATED A1C: CPT

## 2019-11-07 PROCEDURE — 85025 COMPLETE CBC W/AUTO DIFF WBC: CPT

## 2019-11-07 PROCEDURE — 80061 LIPID PANEL: CPT

## 2019-11-07 PROCEDURE — 82043 UR ALBUMIN QUANTITATIVE: CPT

## 2019-11-07 PROCEDURE — 80053 COMPREHEN METABOLIC PANEL: CPT

## 2019-11-12 ENCOUNTER — HOSPITAL ENCOUNTER (OUTPATIENT)
Dept: GENERAL RADIOLOGY | Facility: HOSPITAL | Age: 64
Discharge: HOME | End: 2019-11-12
Admitting: INTERNAL MEDICINE

## 2019-11-12 ENCOUNTER — OFFICE VISIT (OUTPATIENT)
Dept: INTERNAL MEDICINE | Facility: CLINIC | Age: 64
End: 2019-11-12

## 2019-11-12 VITALS
BODY MASS INDEX: 42.68 KG/M2 | HEART RATE: 76 BPM | WEIGHT: 250 LBS | SYSTOLIC BLOOD PRESSURE: 118 MMHG | TEMPERATURE: 98.5 F | DIASTOLIC BLOOD PRESSURE: 82 MMHG | HEIGHT: 64 IN

## 2019-11-12 DIAGNOSIS — I87.2 VENOUS (PERIPHERAL) INSUFFICIENCY: ICD-10-CM

## 2019-11-12 DIAGNOSIS — F32.4 MAJOR DEPRESSIVE DISORDER WITH SINGLE EPISODE, IN PARTIAL REMISSION (HCC): ICD-10-CM

## 2019-11-12 DIAGNOSIS — E66.01 MORBID OBESITY (HCC): ICD-10-CM

## 2019-11-12 DIAGNOSIS — E11.42 DIABETIC POLYNEUROPATHY ASSOCIATED WITH TYPE 2 DIABETES MELLITUS (HCC): ICD-10-CM

## 2019-11-12 DIAGNOSIS — K21.9 GASTROESOPHAGEAL REFLUX DISEASE WITHOUT ESOPHAGITIS: ICD-10-CM

## 2019-11-12 DIAGNOSIS — I10 BENIGN ESSENTIAL HYPERTENSION: ICD-10-CM

## 2019-11-12 DIAGNOSIS — M79.671 RIGHT FOOT PAIN: ICD-10-CM

## 2019-11-12 DIAGNOSIS — E78.2 MIXED HYPERLIPIDEMIA: ICD-10-CM

## 2019-11-12 DIAGNOSIS — Z00.00 PREVENTATIVE HEALTH CARE: Primary | ICD-10-CM

## 2019-11-12 PROCEDURE — 73630 X-RAY EXAM OF FOOT: CPT

## 2019-11-12 PROCEDURE — G0402 INITIAL PREVENTIVE EXAM: HCPCS | Performed by: INTERNAL MEDICINE

## 2019-11-12 PROCEDURE — 90653 IIV ADJUVANT VACCINE IM: CPT | Performed by: INTERNAL MEDICINE

## 2019-11-12 PROCEDURE — G0008 ADMIN INFLUENZA VIRUS VAC: HCPCS | Performed by: INTERNAL MEDICINE

## 2019-11-12 PROCEDURE — 96160 PT-FOCUSED HLTH RISK ASSMT: CPT | Performed by: INTERNAL MEDICINE

## 2019-11-12 NOTE — PROGRESS NOTES
QUICK REFERENCE INFORMATION:  The ABCs of the Annual Wellness Visit    Initial Medicare Wellness Visit    HEALTH RISK ASSESSMENT    1955    Recent Hospitalizations:  No hospitalization(s) within the last year..        Current Medical Providers:  Patient Care Team:  Domingo Sood MD as PCP - General  Domingo Sood MD as PCP - Family Medicine        Smoking Status:  Social History     Tobacco Use   Smoking Status Never Smoker   Smokeless Tobacco Never Used       Alcohol Consumption:  Social History     Substance and Sexual Activity   Alcohol Use No   • Frequency: Never       Depression Screen:   PHQ-2/PHQ-9 Depression Screening 11/12/2019   Little interest or pleasure in doing things 0   Feeling down, depressed, or hopeless 0   Total Score 0       Health Habits and Functional and Cognitive Screening:  Functional & Cognitive Status 11/12/2019   Do you have difficulty preparing food and eating? No   Do you have difficulty bathing yourself, getting dressed or grooming yourself? No   Do you have difficulty using the toilet? No   Do you have difficulty moving around from place to place? No   Do you have trouble with steps or getting out of a bed or a chair? No   Current Diet Well Balanced Diet   Dental Exam Up to date   Eye Exam Up to date   Exercise (times per week) 0 times per week   Current Exercise Activities Include None   Do you need help using the phone?  No   Are you deaf or do you have serious difficulty hearing?  Yes   Do you need help with transportation? No   Do you need help shopping? No   Do you need help preparing meals?  No   Do you need help with housework?  No   Do you need help with laundry? No   Do you need help taking your medications? No   Do you need help managing money? No   Do you ever drive or ride in a car without wearing a seat belt? No   Have you felt unusual stress, anger or loneliness in the last month? No   Who do you live with? Alone   If you need help, do you have  trouble finding someone available to you? No   Have you been bothered in the last four weeks by sexual problems? No   Do you have difficulty concentrating, remembering or making decisions? No       Fall Risk Screen:  MARJORIE Fall Risk Assessment was completed, and patient is at LOW risk for falls.Assessment completed on:11/12/2019    ACE III MINI        Does the patient have evidence of cognitive impairment? Yes    Asiprin use counseling: Does not need ASA (and currently is not on it)      Recent Lab Results:  CMP:  Lab Results   Component Value Date    BUN 13 11/07/2019    CREATININE 1.03 (H) 11/07/2019    EGFRIFNONA 54 (L) 11/07/2019    BCR 12.6 11/07/2019     11/07/2019    K 4.5 11/07/2019    CO2 27.0 11/07/2019    CALCIUM 10.2 11/07/2019    ALBUMIN 4.30 11/07/2019    BILITOT 0.5 11/07/2019    ALKPHOS 128 (H) 11/07/2019    AST 37 (H) 11/07/2019    ALT 54 (H) 11/07/2019     HbA1c:  Lab Results   Component Value Date    HGBA1C 7.80 (H) 11/07/2019    HGBA1C 7.10 (H) 05/06/2019     Microalbumin:  Lab Results   Component Value Date    MICROALBUR <1.2 11/07/2019     Lipid Panel  Lab Results   Component Value Date    CHOL 218 (H) 11/07/2019    TRIG 270 (H) 11/07/2019    HDL 57 11/07/2019     (H) 11/07/2019    AST 37 (H) 11/07/2019    ALT 54 (H) 11/07/2019       Visual Acuity:  No exam data present    Age-appropriate Screening Schedule:  Refer to the list below for future screening recommendations based on patient's age, sex and/or medical conditions. Orders for these recommended tests are listed in the plan section. The patient has been provided with a written plan.    Health Maintenance   Topic Date Due   • ZOSTER VACCINE (1 of 2) 07/21/2004   • DIABETIC FOOT EXAM  09/11/2018   • MAMMOGRAM  09/11/2018   • DIABETIC EYE EXAM  09/11/2018   • HEMOGLOBIN A1C  05/07/2020   • PNEUMOCOCCAL VACCINES (65+ LOW/MEDIUM RISK) (2 of 2 - PPSV23) 05/08/2020   • LIPID PANEL  11/07/2020   • URINE MICROALBUMIN  11/07/2020   •  TDAP/TD VACCINES (2 - Td) 11/05/2025   • COLONOSCOPY  09/26/2028   • INFLUENZA VACCINE  Completed        Subjective   History of Present Illness    Wallace Dukes is a 64 y.o. female who presents for an Annual Wellness Visit.    CHRONIC CONDITIONS    The following portions of the patient's history were reviewed and updated as appropriate: allergies, current medications, past family history, past medical history, past social history, past surgical history and problem list.    Outpatient Medications Prior to Visit   Medication Sig Dispense Refill   • ALBUTEROL SULFATE  (90 Base) MCG/ACT inhaler INHALE 2 PUFFS EVERY 4 (FOUR) HOURS AS NEEDED FOR WHEEZING OR SHORTNESS OF AIR. 18 inhaler 3   • atorvastatin (LIPITOR) 40 MG tablet TAKE 1 TABLET (40MG) BY ORAL ROUTE EVERY DAY 90 tablet 1   • cholecalciferol (VITAMIN D3) 1000 units tablet Take 3 tablets by mouth Every Morning.     • dextromethorphan-guaifenesin (MUCINEX DM MAX STRENGTH)  MG per 12 hr tablet Take  by mouth As Needed.     • docusate sodium (STOOL SOFTENER) 100 MG capsule Take 1 capsule by mouth Daily As Needed.     • escitalopram (LEXAPRO) 10 MG tablet TAKE 1 TABLET BY MOUTH EVERY DAY 90 tablet 1   • lisinopril (PRINIVIL,ZESTRIL) 10 MG tablet TAKE 1 TABLET BY MOUTH EVERY DAY 90 tablet 1   • loratadine (CLARITIN) 10 MG tablet Take 1 tablet by mouth Daily.     • Multiple Vitamins-Minerals (MULTIVITAMIN ADULT PO) Take 1 tablet by mouth Daily.     • pantoprazole (PROTONIX) 40 MG EC tablet TAKE 1 TABLET BY MOUTH TWICE A  tablet 1   • metFORMIN (GLUCOPHAGE) 500 MG tablet TAKE 1 TABLET BY MOUTH TWICE A DAY WITH MEALS 180 tablet 1     No facility-administered medications prior to visit.        Patient Active Problem List   Diagnosis   • GERD (gastroesophageal reflux disease)   • Diabetic neuropathy associated with type 2 diabetes mellitus (CMS/HCC)   • Benign essential hypertension   • Venous (peripheral) insufficiency   • Mixed hyperlipidemia    • Allergic rhinitis   • Morbid obesity (CMS/HCC)   • Right elbow pain   • Diabetes mellitus (CMS/HCC)   • Major depressive disorder with single episode, in partial remission (CMS/[facility])       Advance Care Planning:  Patient has an advance directive - a copy has been provided and is visible in patient header    Identification of Risk Factors:  Risk factors include: Obesity/Overweight .    Review of Systems   Constitutional: Negative for chills, fatigue and fever.   HENT: Negative for congestion, ear pain and sinus pressure.    Respiratory: Positive for cough. Negative for chest tightness, shortness of breath and wheezing.    Cardiovascular: Negative for chest pain and palpitations.   Gastrointestinal: Negative for abdominal pain, blood in stool and constipation.   Skin: Negative for color change.   Allergic/Immunologic: Negative for environmental allergies.   Neurological: Negative for dizziness, speech difficulty and headaches.   Psychiatric/Behavioral: Negative for confusion. The patient is not nervous/anxious.        Compared to one year ago, the patient feels her physical health is the same.  Compared to one year ago, the patient feels her mental health is the same.    Objective     Physical Exam   Constitutional: She is oriented to person, place, and time. She appears well-developed and well-nourished.   HENT:   Head: Normocephalic and atraumatic.   Neck: Normal range of motion. Neck supple. No JVD present. No tracheal deviation present. No thyromegaly present.   Cardiovascular: Normal rate, regular rhythm, normal heart sounds and intact distal pulses. Exam reveals no gallop and no friction rub.   No murmur heard.  Pulmonary/Chest: Effort normal and breath sounds normal.   Abdominal: Soft. Bowel sounds are normal. There is no tenderness.   Musculoskeletal: She exhibits no edema.    Wallace had a diabetic foot exam performed today.   During the foot exam she had a monofilament test performed.    Neurological  "Sensory Findings -  Unaltered sharp/dull right ankle/foot discrimination and unaltered sharp/dull left ankle/foot discrimination. No right ankle/foot altered proprioception and no left ankle/foot altered proprioception  Vascular Status -  Her right foot exhibits normal foot vasculature  and no edema. Her left foot exhibits normal foot vasculature  and no edema.  Skin Integrity  -  Her right foot skin is intact.Her left foot skin is intact..  Lymphadenopathy:     She has no cervical adenopathy.   Neurological: She is alert and oriented to person, place, and time. She displays normal reflexes. No cranial nerve deficit or sensory deficit. She exhibits normal muscle tone. Coordination normal.   Skin: Skin is warm and dry.   Psychiatric: She has a normal mood and affect. Her behavior is normal. Judgment and thought content normal.   Vitals reviewed.       Procedures     Vitals:    11/12/19 1149   BP: 118/82   BP Location: Left arm   Patient Position: Sitting   Cuff Size: Large Adult   Pulse: 76   Temp: 98.5 °F (36.9 °C)   TempSrc: Temporal   Weight: 113 kg (250 lb)   Height: 162 cm (63.78\")   PainSc: 0-No pain       Patient's Body mass index is 43.21 kg/m². BMI is above normal parameters. Recommendations include: exercise counseling and nutrition counseling.      Assessment/Plan   Problem List Items Addressed This Visit        Cardiovascular and Mediastinum    Benign essential hypertension    Relevant Medications    lisinopril (PRINIVIL,ZESTRIL) 10 MG tablet    Venous (peripheral) insufficiency    Mixed hyperlipidemia    Relevant Medications    atorvastatin (LIPITOR) 40 MG tablet       Digestive    GERD (gastroesophageal reflux disease)    Relevant Medications    pantoprazole (PROTONIX) 40 MG EC tablet    Morbid obesity (CMS/HCC)       Endocrine    Diabetic neuropathy associated with type 2 diabetes mellitus (CMS/HCC)    Relevant Medications    metFORMIN (GLUCOPHAGE) 1000 MG tablet       Other    Major depressive " disorder with single episode, in partial remission (CMS/[facility])    Relevant Medications    escitalopram (LEXAPRO) 10 MG tablet      Other Visit Diagnoses     Preventative health care    -  Primary    Right foot pain        Relevant Orders    XR Foot 3+ View Right (Completed)        Plan    A1c is 7.8 which is too high and considerably up from the seven-point when she had in May.  Increase metformin to thousand milligrams twice a day.  She is encouraged to try to lose weight by reducing carbohydrates and total calories.    Will obtain plain x-ray of the right foot.  If this is negative would consider proceeding to MRI to rule out stress fracture.    Patient Self-Management and Personalized Health Advice  The patient has been provided with information about: diet, exercise, weight management and prevention of cardiac or vascular disease and preventive services including:   · Annual Wellness Visit (AWV).    Outpatient Encounter Medications as of 11/12/2019   Medication Sig Dispense Refill   • ALBUTEROL SULFATE  (90 Base) MCG/ACT inhaler INHALE 2 PUFFS EVERY 4 (FOUR) HOURS AS NEEDED FOR WHEEZING OR SHORTNESS OF AIR. 18 inhaler 3   • atorvastatin (LIPITOR) 40 MG tablet TAKE 1 TABLET (40MG) BY ORAL ROUTE EVERY DAY 90 tablet 1   • cholecalciferol (VITAMIN D3) 1000 units tablet Take 3 tablets by mouth Every Morning.     • dextromethorphan-guaifenesin (MUCINEX DM MAX STRENGTH)  MG per 12 hr tablet Take  by mouth As Needed.     • docusate sodium (STOOL SOFTENER) 100 MG capsule Take 1 capsule by mouth Daily As Needed.     • escitalopram (LEXAPRO) 10 MG tablet TAKE 1 TABLET BY MOUTH EVERY DAY 90 tablet 1   • lisinopril (PRINIVIL,ZESTRIL) 10 MG tablet TAKE 1 TABLET BY MOUTH EVERY DAY 90 tablet 1   • loratadine (CLARITIN) 10 MG tablet Take 1 tablet by mouth Daily.     • Multiple Vitamins-Minerals (MULTIVITAMIN ADULT PO) Take 1 tablet by mouth Daily.     • pantoprazole (PROTONIX) 40 MG EC tablet TAKE 1 TABLET BY MOUTH  TWICE A  tablet 1   • [DISCONTINUED] metFORMIN (GLUCOPHAGE) 500 MG tablet TAKE 1 TABLET BY MOUTH TWICE A DAY WITH MEALS 180 tablet 1   • metFORMIN (GLUCOPHAGE) 1000 MG tablet Take 1 tablet by mouth 2 (Two) Times a Day With Meals. 60 tablet 5     No facility-administered encounter medications on file as of 11/12/2019.        Reviewed use of high risk medication in the elderly: yes  Reviewed for potential of harmful drug interactions in the elderly: yes    Follow Up:  Return in about 6 months (around 5/12/2020) for follow up.     There are no Patient Instructions on file for this visit.    An After Visit Summary and PPPS with all of these plans were given to the patient.

## 2019-11-13 PROBLEM — F32.4 MAJOR DEPRESSIVE DISORDER WITH SINGLE EPISODE, IN PARTIAL REMISSION: Status: ACTIVE | Noted: 2019-11-13

## 2019-11-25 RX ORDER — PANTOPRAZOLE SODIUM 40 MG/1
TABLET, DELAYED RELEASE ORAL
Qty: 180 TABLET | Refills: 1 | Status: SHIPPED | OUTPATIENT
Start: 2019-11-25 | End: 2020-08-19

## 2019-11-25 RX ORDER — ATORVASTATIN CALCIUM 40 MG/1
TABLET, FILM COATED ORAL
Qty: 90 TABLET | Refills: 1 | Status: SHIPPED | OUTPATIENT
Start: 2019-11-25 | End: 2020-06-24

## 2019-11-30 ENCOUNTER — APPOINTMENT (OUTPATIENT)
Dept: GENERAL RADIOLOGY | Facility: HOSPITAL | Age: 64
End: 2019-11-30

## 2019-11-30 ENCOUNTER — APPOINTMENT (OUTPATIENT)
Dept: CT IMAGING | Facility: HOSPITAL | Age: 64
End: 2019-11-30

## 2019-11-30 ENCOUNTER — HOSPITAL ENCOUNTER (INPATIENT)
Facility: HOSPITAL | Age: 64
LOS: 3 days | Discharge: HOME | End: 2019-12-03
Attending: EMERGENCY MEDICINE | Admitting: INTERNAL MEDICINE

## 2019-11-30 DIAGNOSIS — E66.01 CLASS 3 SEVERE OBESITY DUE TO EXCESS CALORIES WITHOUT SERIOUS COMORBIDITY WITH BODY MASS INDEX (BMI) OF 40.0 TO 44.9 IN ADULT (HCC): ICD-10-CM

## 2019-11-30 DIAGNOSIS — K80.00 ACUTE CALCULOUS CHOLECYSTITIS: Primary | ICD-10-CM

## 2019-11-30 DIAGNOSIS — R50.9 ACUTE FEBRILE ILLNESS: ICD-10-CM

## 2019-11-30 DIAGNOSIS — R10.13 EPIGASTRIC ABDOMINAL PAIN: ICD-10-CM

## 2019-11-30 DIAGNOSIS — I10 ELEVATED BLOOD PRESSURE READING WITH DIAGNOSIS OF HYPERTENSION: ICD-10-CM

## 2019-11-30 DIAGNOSIS — K81.9 CHOLECYSTITIS: ICD-10-CM

## 2019-11-30 LAB
ALBUMIN SERPL-MCNC: 4.2 G/DL (ref 3.5–5.2)
ALBUMIN/GLOB SERPL: 1.4 G/DL
ALP SERPL-CCNC: 123 U/L (ref 39–117)
ALT SERPL W P-5'-P-CCNC: 36 U/L (ref 1–33)
ANION GAP SERPL CALCULATED.3IONS-SCNC: 14 MMOL/L (ref 5–15)
AST SERPL-CCNC: 26 U/L (ref 1–32)
BASOPHILS # BLD AUTO: 0.07 10*3/MM3 (ref 0–0.2)
BASOPHILS NFR BLD AUTO: 0.5 % (ref 0–1.5)
BILIRUB SERPL-MCNC: 0.6 MG/DL (ref 0.2–1.2)
BILIRUB UR QL STRIP: NEGATIVE
BUN BLD-MCNC: 12 MG/DL (ref 8–23)
BUN/CREAT SERPL: 12.9 (ref 7–25)
CALCIUM SPEC-SCNC: 9.8 MG/DL (ref 8.6–10.5)
CHLORIDE SERPL-SCNC: 98 MMOL/L (ref 98–107)
CLARITY UR: CLEAR
CO2 SERPL-SCNC: 26 MMOL/L (ref 22–29)
COLOR UR: YELLOW
CREAT BLD-MCNC: 0.93 MG/DL (ref 0.57–1)
D-LACTATE SERPL-SCNC: 1.7 MMOL/L (ref 0.5–2)
DEPRECATED RDW RBC AUTO: 44.3 FL (ref 37–54)
EOSINOPHIL # BLD AUTO: 0.06 10*3/MM3 (ref 0–0.4)
EOSINOPHIL NFR BLD AUTO: 0.4 % (ref 0.3–6.2)
ERYTHROCYTE [DISTWIDTH] IN BLOOD BY AUTOMATED COUNT: 13 % (ref 12.3–15.4)
GFR SERPL CREATININE-BSD FRML MDRD: 61 ML/MIN/1.73
GLOBULIN UR ELPH-MCNC: 3.1 GM/DL
GLUCOSE BLD-MCNC: 128 MG/DL (ref 65–99)
GLUCOSE UR STRIP-MCNC: NEGATIVE MG/DL
HCT VFR BLD AUTO: 39.7 % (ref 34–46.6)
HGB BLD-MCNC: 13.1 G/DL (ref 12–15.9)
HGB UR QL STRIP.AUTO: NEGATIVE
HOLD SPECIMEN: NORMAL
HOLD SPECIMEN: NORMAL
IMM GRANULOCYTES # BLD AUTO: 0.08 10*3/MM3 (ref 0–0.05)
IMM GRANULOCYTES NFR BLD AUTO: 0.6 % (ref 0–0.5)
KETONES UR QL STRIP: ABNORMAL
LEUKOCYTE ESTERASE UR QL STRIP.AUTO: NEGATIVE
LIPASE SERPL-CCNC: 36 U/L (ref 13–60)
LYMPHOCYTES # BLD AUTO: 2.49 10*3/MM3 (ref 0.7–3.1)
LYMPHOCYTES NFR BLD AUTO: 17.2 % (ref 19.6–45.3)
MCH RBC QN AUTO: 30.3 PG (ref 26.6–33)
MCHC RBC AUTO-ENTMCNC: 33 G/DL (ref 31.5–35.7)
MCV RBC AUTO: 91.9 FL (ref 79–97)
MONOCYTES # BLD AUTO: 0.66 10*3/MM3 (ref 0.1–0.9)
MONOCYTES NFR BLD AUTO: 4.6 % (ref 5–12)
NEUTROPHILS # BLD AUTO: 11.09 10*3/MM3 (ref 1.7–7)
NEUTROPHILS NFR BLD AUTO: 76.7 % (ref 42.7–76)
NITRITE UR QL STRIP: NEGATIVE
NRBC BLD AUTO-RTO: 0 /100 WBC (ref 0–0.2)
NT-PROBNP SERPL-MCNC: 100.9 PG/ML (ref 5–900)
PH UR STRIP.AUTO: 5.5 [PH] (ref 5–8)
PLATELET # BLD AUTO: 412 10*3/MM3 (ref 140–450)
PMV BLD AUTO: 9.1 FL (ref 6–12)
POTASSIUM BLD-SCNC: 3.6 MMOL/L (ref 3.5–5.2)
PROCALCITONIN SERPL-MCNC: 0.08 NG/ML (ref 0.1–0.25)
PROT SERPL-MCNC: 7.3 G/DL (ref 6–8.5)
PROT UR QL STRIP: NEGATIVE
RBC # BLD AUTO: 4.32 10*6/MM3 (ref 3.77–5.28)
SODIUM BLD-SCNC: 138 MMOL/L (ref 136–145)
SP GR UR STRIP: 1.02 (ref 1–1.03)
TROPONIN T SERPL-MCNC: <0.01 NG/ML (ref 0–0.03)
UROBILINOGEN UR QL STRIP: ABNORMAL
WBC NRBC COR # BLD: 14.45 10*3/MM3 (ref 3.4–10.8)
WHOLE BLOOD HOLD SPECIMEN: NORMAL
WHOLE BLOOD HOLD SPECIMEN: NORMAL

## 2019-11-30 PROCEDURE — 71045 X-RAY EXAM CHEST 1 VIEW: CPT

## 2019-11-30 PROCEDURE — 83880 ASSAY OF NATRIURETIC PEPTIDE: CPT | Performed by: EMERGENCY MEDICINE

## 2019-11-30 PROCEDURE — 87040 BLOOD CULTURE FOR BACTERIA: CPT | Performed by: EMERGENCY MEDICINE

## 2019-11-30 PROCEDURE — 83605 ASSAY OF LACTIC ACID: CPT | Performed by: EMERGENCY MEDICINE

## 2019-11-30 PROCEDURE — 84484 ASSAY OF TROPONIN QUANT: CPT | Performed by: EMERGENCY MEDICINE

## 2019-11-30 PROCEDURE — 25010000002 PIPERACILLIN SOD-TAZOBACTAM PER 1 G: Performed by: EMERGENCY MEDICINE

## 2019-11-30 PROCEDURE — 80053 COMPREHEN METABOLIC PANEL: CPT | Performed by: EMERGENCY MEDICINE

## 2019-11-30 PROCEDURE — 93005 ELECTROCARDIOGRAM TRACING: CPT | Performed by: EMERGENCY MEDICINE

## 2019-11-30 PROCEDURE — 84145 PROCALCITONIN (PCT): CPT | Performed by: EMERGENCY MEDICINE

## 2019-11-30 PROCEDURE — 81003 URINALYSIS AUTO W/O SCOPE: CPT | Performed by: EMERGENCY MEDICINE

## 2019-11-30 PROCEDURE — 74176 CT ABD & PELVIS W/O CONTRAST: CPT

## 2019-11-30 PROCEDURE — 25010000002 FENTANYL CITRATE (PF) 100 MCG/2ML SOLUTION: Performed by: EMERGENCY MEDICINE

## 2019-11-30 PROCEDURE — 25010000002 ONDANSETRON PER 1 MG: Performed by: EMERGENCY MEDICINE

## 2019-11-30 PROCEDURE — 85025 COMPLETE CBC W/AUTO DIFF WBC: CPT | Performed by: EMERGENCY MEDICINE

## 2019-11-30 PROCEDURE — 99284 EMERGENCY DEPT VISIT MOD MDM: CPT

## 2019-11-30 PROCEDURE — 83690 ASSAY OF LIPASE: CPT | Performed by: EMERGENCY MEDICINE

## 2019-11-30 RX ORDER — SODIUM CHLORIDE 9 MG/ML
100 INJECTION, SOLUTION INTRAVENOUS CONTINUOUS
Status: ACTIVE | OUTPATIENT
Start: 2019-12-01 | End: 2019-12-01

## 2019-11-30 RX ORDER — MORPHINE SULFATE 4 MG/ML
3 INJECTION, SOLUTION INTRAMUSCULAR; INTRAVENOUS
Status: DISCONTINUED | OUTPATIENT
Start: 2019-11-30 | End: 2019-12-03 | Stop reason: HOSPADM

## 2019-11-30 RX ORDER — ALUMINA, MAGNESIA, AND SIMETHICONE 2400; 2400; 240 MG/30ML; MG/30ML; MG/30ML
15 SUSPENSION ORAL ONCE
Status: COMPLETED | OUTPATIENT
Start: 2019-11-30 | End: 2019-11-30

## 2019-11-30 RX ORDER — ACETAMINOPHEN 500 MG
1000 TABLET ORAL ONCE
Status: COMPLETED | OUTPATIENT
Start: 2019-11-30 | End: 2019-11-30

## 2019-11-30 RX ORDER — LIDOCAINE HYDROCHLORIDE 20 MG/ML
15 SOLUTION OROPHARYNGEAL ONCE
Status: COMPLETED | OUTPATIENT
Start: 2019-11-30 | End: 2019-11-30

## 2019-11-30 RX ORDER — ASPIRIN 81 MG/1
324 TABLET, CHEWABLE ORAL ONCE
Status: COMPLETED | OUTPATIENT
Start: 2019-11-30 | End: 2019-11-30

## 2019-11-30 RX ORDER — FAMOTIDINE 10 MG/ML
20 INJECTION, SOLUTION INTRAVENOUS ONCE
Status: COMPLETED | OUTPATIENT
Start: 2019-11-30 | End: 2019-11-30

## 2019-11-30 RX ORDER — ONDANSETRON 2 MG/ML
4 INJECTION INTRAMUSCULAR; INTRAVENOUS EVERY 6 HOURS PRN
Status: DISCONTINUED | OUTPATIENT
Start: 2019-11-30 | End: 2019-12-03 | Stop reason: HOSPADM

## 2019-11-30 RX ORDER — FENTANYL CITRATE 50 UG/ML
50 INJECTION, SOLUTION INTRAMUSCULAR; INTRAVENOUS
Status: DISCONTINUED | OUTPATIENT
Start: 2019-11-30 | End: 2019-11-30

## 2019-11-30 RX ORDER — SODIUM CHLORIDE 0.9 % (FLUSH) 0.9 %
10 SYRINGE (ML) INJECTION AS NEEDED
Status: DISCONTINUED | OUTPATIENT
Start: 2019-11-30 | End: 2019-12-03 | Stop reason: HOSPADM

## 2019-11-30 RX ORDER — NALOXONE HCL 0.4 MG/ML
0.4 VIAL (ML) INJECTION
Status: DISCONTINUED | OUTPATIENT
Start: 2019-11-30 | End: 2019-12-03 | Stop reason: HOSPADM

## 2019-11-30 RX ORDER — SODIUM CHLORIDE 0.9 % (FLUSH) 0.9 %
10 SYRINGE (ML) INJECTION EVERY 12 HOURS SCHEDULED
Status: DISCONTINUED | OUTPATIENT
Start: 2019-12-01 | End: 2019-12-03 | Stop reason: HOSPADM

## 2019-11-30 RX ORDER — ONDANSETRON 2 MG/ML
4 INJECTION INTRAMUSCULAR; INTRAVENOUS ONCE
Status: COMPLETED | OUTPATIENT
Start: 2019-11-30 | End: 2019-11-30

## 2019-11-30 RX ADMIN — ONDANSETRON 4 MG: 2 INJECTION INTRAMUSCULAR; INTRAVENOUS at 20:27

## 2019-11-30 RX ADMIN — ASPIRIN 81 MG 324 MG: 81 TABLET ORAL at 20:09

## 2019-11-30 RX ADMIN — FENTANYL CITRATE 50 MCG: 0.05 INJECTION, SOLUTION INTRAMUSCULAR; INTRAVENOUS at 22:01

## 2019-11-30 RX ADMIN — ALUMINUM HYDROXIDE, MAGNESIUM HYDROXIDE, AND DIMETHICONE 15 ML: 400; 400; 40 SUSPENSION ORAL at 20:29

## 2019-11-30 RX ADMIN — TAZOBACTAM SODIUM AND PIPERACILLIN SODIUM 3.38 G: 375; 3 INJECTION, SOLUTION INTRAVENOUS at 22:03

## 2019-11-30 RX ADMIN — FAMOTIDINE 20 MG: 10 INJECTION, SOLUTION INTRAVENOUS at 20:28

## 2019-11-30 RX ADMIN — SODIUM CHLORIDE 500 ML: 9 INJECTION, SOLUTION INTRAVENOUS at 20:26

## 2019-11-30 RX ADMIN — ACETAMINOPHEN 1000 MG: 500 TABLET, FILM COATED ORAL at 20:26

## 2019-11-30 RX ADMIN — LIDOCAINE HYDROCHLORIDE 15 ML: 20 SOLUTION ORAL; TOPICAL at 20:30

## 2019-12-01 ENCOUNTER — ANESTHESIA EVENT (OUTPATIENT)
Dept: PERIOP | Facility: HOSPITAL | Age: 64
End: 2019-12-01

## 2019-12-01 ENCOUNTER — APPOINTMENT (OUTPATIENT)
Dept: MRI IMAGING | Facility: HOSPITAL | Age: 64
End: 2019-12-01

## 2019-12-01 ENCOUNTER — APPOINTMENT (OUTPATIENT)
Dept: GENERAL RADIOLOGY | Facility: HOSPITAL | Age: 64
End: 2019-12-01

## 2019-12-01 ENCOUNTER — ANESTHESIA (OUTPATIENT)
Dept: PERIOP | Facility: HOSPITAL | Age: 64
End: 2019-12-01

## 2019-12-01 LAB
ALBUMIN SERPL-MCNC: 3.2 G/DL (ref 3.5–5.2)
ALBUMIN/GLOB SERPL: 1.2 G/DL
ALP SERPL-CCNC: 99 U/L (ref 39–117)
ALT SERPL W P-5'-P-CCNC: 36 U/L (ref 1–33)
ANION GAP SERPL CALCULATED.3IONS-SCNC: 12 MMOL/L (ref 5–15)
AST SERPL-CCNC: 38 U/L (ref 1–32)
BASOPHILS # BLD AUTO: 0.05 10*3/MM3 (ref 0–0.2)
BASOPHILS NFR BLD AUTO: 0.6 % (ref 0–1.5)
BILIRUB SERPL-MCNC: 0.5 MG/DL (ref 0.2–1.2)
BUN BLD-MCNC: 11 MG/DL (ref 8–23)
BUN/CREAT SERPL: 11.3 (ref 7–25)
CALCIUM SPEC-SCNC: 8.5 MG/DL (ref 8.6–10.5)
CHLORIDE SERPL-SCNC: 101 MMOL/L (ref 98–107)
CO2 SERPL-SCNC: 27 MMOL/L (ref 22–29)
CREAT BLD-MCNC: 0.97 MG/DL (ref 0.57–1)
DEPRECATED RDW RBC AUTO: 45.2 FL (ref 37–54)
EOSINOPHIL # BLD AUTO: 0.27 10*3/MM3 (ref 0–0.4)
EOSINOPHIL NFR BLD AUTO: 3.3 % (ref 0.3–6.2)
ERYTHROCYTE [DISTWIDTH] IN BLOOD BY AUTOMATED COUNT: 13.2 % (ref 12.3–15.4)
GFR SERPL CREATININE-BSD FRML MDRD: 58 ML/MIN/1.73
GLOBULIN UR ELPH-MCNC: 2.7 GM/DL
GLUCOSE BLD-MCNC: 123 MG/DL (ref 65–99)
GLUCOSE BLDC GLUCOMTR-MCNC: 123 MG/DL (ref 70–130)
GLUCOSE BLDC GLUCOMTR-MCNC: 143 MG/DL (ref 70–130)
GLUCOSE BLDC GLUCOMTR-MCNC: 175 MG/DL (ref 70–130)
GLUCOSE BLDC GLUCOMTR-MCNC: 208 MG/DL (ref 70–130)
GLUCOSE BLDC GLUCOMTR-MCNC: 209 MG/DL (ref 70–130)
HBA1C MFR BLD: 8.2 % (ref 4.8–5.6)
HCT VFR BLD AUTO: 35.4 % (ref 34–46.6)
HGB BLD-MCNC: 11.3 G/DL (ref 12–15.9)
IMM GRANULOCYTES # BLD AUTO: 0.03 10*3/MM3 (ref 0–0.05)
IMM GRANULOCYTES NFR BLD AUTO: 0.4 % (ref 0–0.5)
LYMPHOCYTES # BLD AUTO: 3.05 10*3/MM3 (ref 0.7–3.1)
LYMPHOCYTES NFR BLD AUTO: 37.6 % (ref 19.6–45.3)
MCH RBC QN AUTO: 30.2 PG (ref 26.6–33)
MCHC RBC AUTO-ENTMCNC: 31.9 G/DL (ref 31.5–35.7)
MCV RBC AUTO: 94.7 FL (ref 79–97)
MONOCYTES # BLD AUTO: 0.67 10*3/MM3 (ref 0.1–0.9)
MONOCYTES NFR BLD AUTO: 8.3 % (ref 5–12)
NEUTROPHILS # BLD AUTO: 4.05 10*3/MM3 (ref 1.7–7)
NEUTROPHILS NFR BLD AUTO: 49.8 % (ref 42.7–76)
NRBC BLD AUTO-RTO: 0 /100 WBC (ref 0–0.2)
PLATELET # BLD AUTO: 331 10*3/MM3 (ref 140–450)
PMV BLD AUTO: 9.4 FL (ref 6–12)
POTASSIUM BLD-SCNC: 3.7 MMOL/L (ref 3.5–5.2)
PROCALCITONIN SERPL-MCNC: 0.06 NG/ML (ref 0.1–0.25)
PROT SERPL-MCNC: 5.9 G/DL (ref 6–8.5)
RBC # BLD AUTO: 3.74 10*6/MM3 (ref 3.77–5.28)
SODIUM BLD-SCNC: 140 MMOL/L (ref 136–145)
WBC NRBC COR # BLD: 8.12 10*3/MM3 (ref 3.4–10.8)

## 2019-12-01 PROCEDURE — 63710000001 INSULIN LISPRO (HUMAN) PER 5 UNITS: Performed by: INTERNAL MEDICINE

## 2019-12-01 PROCEDURE — 87205 SMEAR GRAM STAIN: CPT | Performed by: SURGERY

## 2019-12-01 PROCEDURE — 87075 CULTR BACTERIA EXCEPT BLOOD: CPT | Performed by: SURGERY

## 2019-12-01 PROCEDURE — 25010000002 ONDANSETRON PER 1 MG: Performed by: NURSE ANESTHETIST, CERTIFIED REGISTERED

## 2019-12-01 PROCEDURE — 99223 1ST HOSP IP/OBS HIGH 75: CPT | Performed by: INTERNAL MEDICINE

## 2019-12-01 PROCEDURE — 25010000002 DEXAMETHASONE PER 1 MG: Performed by: NURSE ANESTHETIST, CERTIFIED REGISTERED

## 2019-12-01 PROCEDURE — 25010000002 PROPOFOL 10 MG/ML EMULSION: Performed by: NURSE ANESTHETIST, CERTIFIED REGISTERED

## 2019-12-01 PROCEDURE — 63710000001 INSULIN REGULAR HUMAN PER 5 UNITS

## 2019-12-01 PROCEDURE — 25010000002 PIPERACILLIN SOD-TAZOBACTAM PER 1 G: Performed by: INTERNAL MEDICINE

## 2019-12-01 PROCEDURE — 87070 CULTURE OTHR SPECIMN AEROBIC: CPT | Performed by: SURGERY

## 2019-12-01 PROCEDURE — 87015 SPECIMEN INFECT AGNT CONCNTJ: CPT | Performed by: SURGERY

## 2019-12-01 PROCEDURE — 25010000002 HYDROMORPHONE PER 4 MG: Performed by: NURSE ANESTHETIST, CERTIFIED REGISTERED

## 2019-12-01 PROCEDURE — 80053 COMPREHEN METABOLIC PANEL: CPT | Performed by: INTERNAL MEDICINE

## 2019-12-01 PROCEDURE — 0FT44ZZ RESECTION OF GALLBLADDER, PERCUTANEOUS ENDOSCOPIC APPROACH: ICD-10-PCS | Performed by: SURGERY

## 2019-12-01 PROCEDURE — 74181 MRI ABDOMEN W/O CONTRAST: CPT

## 2019-12-01 PROCEDURE — 82962 GLUCOSE BLOOD TEST: CPT

## 2019-12-01 PROCEDURE — 83036 HEMOGLOBIN GLYCOSYLATED A1C: CPT | Performed by: INTERNAL MEDICINE

## 2019-12-01 PROCEDURE — 25010000002 METHYLNALTREXONE 12 MG/0.6ML SOLUTION: Performed by: SURGERY

## 2019-12-01 PROCEDURE — 84145 PROCALCITONIN (PCT): CPT | Performed by: INTERNAL MEDICINE

## 2019-12-01 PROCEDURE — 25010000002 NEOSTIGMINE 10 MG/10ML SOLUTION: Performed by: NURSE ANESTHETIST, CERTIFIED REGISTERED

## 2019-12-01 PROCEDURE — 25010000002 MORPHINE PER 10 MG: Performed by: INTERNAL MEDICINE

## 2019-12-01 PROCEDURE — 85025 COMPLETE CBC W/AUTO DIFF WBC: CPT | Performed by: INTERNAL MEDICINE

## 2019-12-01 PROCEDURE — 25010000002 ONDANSETRON PER 1 MG: Performed by: INTERNAL MEDICINE

## 2019-12-01 PROCEDURE — 25010000002 FENTANYL CITRATE (PF) 100 MCG/2ML SOLUTION: Performed by: NURSE ANESTHETIST, CERTIFIED REGISTERED

## 2019-12-01 PROCEDURE — 88304 TISSUE EXAM BY PATHOLOGIST: CPT | Performed by: SURGERY

## 2019-12-01 RX ORDER — ESMOLOL HYDROCHLORIDE 10 MG/ML
INJECTION INTRAVENOUS AS NEEDED
Status: DISCONTINUED | OUTPATIENT
Start: 2019-12-01 | End: 2019-12-01 | Stop reason: SURG

## 2019-12-01 RX ORDER — FAMOTIDINE 20 MG/1
20 TABLET, FILM COATED ORAL
Status: DISCONTINUED | OUTPATIENT
Start: 2019-12-01 | End: 2019-12-03 | Stop reason: HOSPADM

## 2019-12-01 RX ORDER — PROPOFOL 10 MG/ML
VIAL (ML) INTRAVENOUS CONTINUOUS PRN
Status: DISCONTINUED | OUTPATIENT
Start: 2019-12-01 | End: 2019-12-01 | Stop reason: SURG

## 2019-12-01 RX ORDER — LABETALOL HYDROCHLORIDE 5 MG/ML
5 INJECTION, SOLUTION INTRAVENOUS
Status: DISCONTINUED | OUTPATIENT
Start: 2019-12-01 | End: 2019-12-01 | Stop reason: HOSPADM

## 2019-12-01 RX ORDER — DEXTROSE MONOHYDRATE 25 G/50ML
25 INJECTION, SOLUTION INTRAVENOUS
Status: DISCONTINUED | OUTPATIENT
Start: 2019-12-01 | End: 2019-12-03 | Stop reason: HOSPADM

## 2019-12-01 RX ORDER — MAGNESIUM HYDROXIDE 1200 MG/15ML
LIQUID ORAL AS NEEDED
Status: DISCONTINUED | OUTPATIENT
Start: 2019-12-01 | End: 2019-12-01 | Stop reason: HOSPADM

## 2019-12-01 RX ORDER — ONDANSETRON 4 MG/1
4 TABLET, FILM COATED ORAL EVERY 8 HOURS PRN
Status: DISCONTINUED | OUTPATIENT
Start: 2019-12-01 | End: 2019-12-03 | Stop reason: HOSPADM

## 2019-12-01 RX ORDER — MELOXICAM 7.5 MG/1
7.5 TABLET ORAL DAILY
Status: DISCONTINUED | OUTPATIENT
Start: 2019-12-01 | End: 2019-12-03 | Stop reason: HOSPADM

## 2019-12-01 RX ORDER — ROCURONIUM BROMIDE 10 MG/ML
INJECTION, SOLUTION INTRAVENOUS AS NEEDED
Status: DISCONTINUED | OUTPATIENT
Start: 2019-12-01 | End: 2019-12-01 | Stop reason: SURG

## 2019-12-01 RX ORDER — PROPOFOL 10 MG/ML
VIAL (ML) INTRAVENOUS AS NEEDED
Status: DISCONTINUED | OUTPATIENT
Start: 2019-12-01 | End: 2019-12-01 | Stop reason: SURG

## 2019-12-01 RX ORDER — BUPIVACAINE HYDROCHLORIDE 5 MG/ML
INJECTION, SOLUTION PERINEURAL AS NEEDED
Status: DISCONTINUED | OUTPATIENT
Start: 2019-12-01 | End: 2019-12-01 | Stop reason: HOSPADM

## 2019-12-01 RX ORDER — LIDOCAINE HYDROCHLORIDE 10 MG/ML
INJECTION, SOLUTION EPIDURAL; INFILTRATION; INTRACAUDAL; PERINEURAL AS NEEDED
Status: DISCONTINUED | OUTPATIENT
Start: 2019-12-01 | End: 2019-12-01 | Stop reason: SURG

## 2019-12-01 RX ORDER — FENTANYL CITRATE 50 UG/ML
50 INJECTION, SOLUTION INTRAMUSCULAR; INTRAVENOUS
Status: DISCONTINUED | OUTPATIENT
Start: 2019-12-01 | End: 2019-12-01 | Stop reason: HOSPADM

## 2019-12-01 RX ORDER — SODIUM CHLORIDE, SODIUM LACTATE, POTASSIUM CHLORIDE, CALCIUM CHLORIDE 600; 310; 30; 20 MG/100ML; MG/100ML; MG/100ML; MG/100ML
INJECTION, SOLUTION INTRAVENOUS CONTINUOUS PRN
Status: DISCONTINUED | OUTPATIENT
Start: 2019-12-01 | End: 2019-12-01 | Stop reason: SURG

## 2019-12-01 RX ORDER — HYDROCODONE BITARTRATE AND ACETAMINOPHEN 5; 325 MG/1; MG/1
1 TABLET ORAL EVERY 6 HOURS PRN
Status: DISCONTINUED | OUTPATIENT
Start: 2019-12-01 | End: 2019-12-03 | Stop reason: HOSPADM

## 2019-12-01 RX ORDER — LABETALOL HYDROCHLORIDE 5 MG/ML
INJECTION, SOLUTION INTRAVENOUS AS NEEDED
Status: DISCONTINUED | OUTPATIENT
Start: 2019-12-01 | End: 2019-12-01 | Stop reason: SURG

## 2019-12-01 RX ORDER — DEXAMETHASONE SODIUM PHOSPHATE 4 MG/ML
INJECTION, SOLUTION INTRA-ARTICULAR; INTRALESIONAL; INTRAMUSCULAR; INTRAVENOUS; SOFT TISSUE AS NEEDED
Status: DISCONTINUED | OUTPATIENT
Start: 2019-12-01 | End: 2019-12-01 | Stop reason: SURG

## 2019-12-01 RX ORDER — ALBUTEROL SULFATE 2.5 MG/3ML
2.5 SOLUTION RESPIRATORY (INHALATION) ONCE AS NEEDED
Status: DISCONTINUED | OUTPATIENT
Start: 2019-12-01 | End: 2019-12-01 | Stop reason: HOSPADM

## 2019-12-01 RX ORDER — DOCUSATE SODIUM 100 MG/1
100 CAPSULE, LIQUID FILLED ORAL 2 TIMES DAILY
Status: DISCONTINUED | OUTPATIENT
Start: 2019-12-01 | End: 2019-12-03 | Stop reason: HOSPADM

## 2019-12-01 RX ORDER — ONDANSETRON 2 MG/ML
INJECTION INTRAMUSCULAR; INTRAVENOUS AS NEEDED
Status: DISCONTINUED | OUTPATIENT
Start: 2019-12-01 | End: 2019-12-01 | Stop reason: SURG

## 2019-12-01 RX ORDER — NEOSTIGMINE METHYLSULFATE 1 MG/ML
INJECTION, SOLUTION INTRAVENOUS AS NEEDED
Status: DISCONTINUED | OUTPATIENT
Start: 2019-12-01 | End: 2019-12-01 | Stop reason: SURG

## 2019-12-01 RX ORDER — FENTANYL CITRATE 50 UG/ML
INJECTION, SOLUTION INTRAMUSCULAR; INTRAVENOUS AS NEEDED
Status: DISCONTINUED | OUTPATIENT
Start: 2019-12-01 | End: 2019-12-01 | Stop reason: SURG

## 2019-12-01 RX ORDER — HYDROMORPHONE HYDROCHLORIDE 1 MG/ML
0.5 INJECTION, SOLUTION INTRAMUSCULAR; INTRAVENOUS; SUBCUTANEOUS
Status: DISCONTINUED | OUTPATIENT
Start: 2019-12-01 | End: 2019-12-01 | Stop reason: HOSPADM

## 2019-12-01 RX ORDER — NICOTINE POLACRILEX 4 MG
15 LOZENGE BUCCAL
Status: DISCONTINUED | OUTPATIENT
Start: 2019-12-01 | End: 2019-12-03 | Stop reason: HOSPADM

## 2019-12-01 RX ORDER — GLYCOPYRROLATE 0.2 MG/ML
INJECTION INTRAMUSCULAR; INTRAVENOUS AS NEEDED
Status: DISCONTINUED | OUTPATIENT
Start: 2019-12-01 | End: 2019-12-01 | Stop reason: SURG

## 2019-12-01 RX ORDER — SODIUM CHLORIDE 9 MG/ML
INJECTION, SOLUTION INTRAVENOUS AS NEEDED
Status: DISCONTINUED | OUTPATIENT
Start: 2019-12-01 | End: 2019-12-01 | Stop reason: HOSPADM

## 2019-12-01 RX ADMIN — SODIUM CHLORIDE, PRESERVATIVE FREE 10 ML: 5 INJECTION INTRAVENOUS at 00:28

## 2019-12-01 RX ADMIN — FENTANYL CITRATE 25 MCG: 50 INJECTION, SOLUTION INTRAMUSCULAR; INTRAVENOUS at 10:44

## 2019-12-01 RX ADMIN — HYDROMORPHONE HYDROCHLORIDE 0.5 MG: 1 INJECTION, SOLUTION INTRAMUSCULAR; INTRAVENOUS; SUBCUTANEOUS at 12:29

## 2019-12-01 RX ADMIN — PROPOFOL 200 MG: 10 INJECTION, EMULSION INTRAVENOUS at 10:17

## 2019-12-01 RX ADMIN — LABETALOL HYDROCHLORIDE 5 MG: 5 INJECTION, SOLUTION INTRAVENOUS at 11:23

## 2019-12-01 RX ADMIN — LIDOCAINE HYDROCHLORIDE 50 MG: 10 INJECTION, SOLUTION EPIDURAL; INFILTRATION; INTRACAUDAL; PERINEURAL at 10:17

## 2019-12-01 RX ADMIN — PROPOFOL 25 MCG/KG/MIN: 10 INJECTION, EMULSION INTRAVENOUS at 10:24

## 2019-12-01 RX ADMIN — MORPHINE SULFATE 3 MG: 4 INJECTION, SOLUTION INTRAMUSCULAR; INTRAVENOUS at 06:24

## 2019-12-01 RX ADMIN — METHYLNALTREXONE BROMIDE 6 MG: 12 INJECTION, SOLUTION SUBCUTANEOUS at 16:11

## 2019-12-01 RX ADMIN — ROCURONIUM BROMIDE 50 MG: 10 SOLUTION INTRAVENOUS at 10:17

## 2019-12-01 RX ADMIN — SODIUM CHLORIDE, POTASSIUM CHLORIDE, SODIUM LACTATE AND CALCIUM CHLORIDE: 600; 310; 30; 20 INJECTION, SOLUTION INTRAVENOUS at 10:12

## 2019-12-01 RX ADMIN — ONDANSETRON 4 MG: 2 INJECTION INTRAMUSCULAR; INTRAVENOUS at 11:25

## 2019-12-01 RX ADMIN — MORPHINE SULFATE 3 MG: 4 INJECTION, SOLUTION INTRAMUSCULAR; INTRAVENOUS at 00:27

## 2019-12-01 RX ADMIN — SODIUM CHLORIDE, PRESERVATIVE FREE 10 ML: 5 INJECTION INTRAVENOUS at 21:10

## 2019-12-01 RX ADMIN — INSULIN HUMAN 3 UNITS: 100 INJECTION, SOLUTION PARENTERAL at 21:12

## 2019-12-01 RX ADMIN — GLYCOPYRROLATE 0.2 MG: 0.2 INJECTION, SOLUTION INTRAMUSCULAR; INTRAVENOUS at 10:30

## 2019-12-01 RX ADMIN — TAZOBACTAM SODIUM AND PIPERACILLIN SODIUM 3.38 G: 375; 3 INJECTION, SOLUTION INTRAVENOUS at 21:07

## 2019-12-01 RX ADMIN — HYDROCODONE BITARTRATE AND ACETAMINOPHEN 1 TABLET: 5; 325 TABLET ORAL at 18:30

## 2019-12-01 RX ADMIN — FAMOTIDINE 20 MG: 20 TABLET ORAL at 16:10

## 2019-12-01 RX ADMIN — SODIUM CHLORIDE, POTASSIUM CHLORIDE, SODIUM LACTATE AND CALCIUM CHLORIDE: 600; 310; 30; 20 INJECTION, SOLUTION INTRAVENOUS at 11:36

## 2019-12-01 RX ADMIN — EPHEDRINE SULFATE 10 MG: 50 INJECTION INTRAMUSCULAR; INTRAVENOUS; SUBCUTANEOUS at 10:39

## 2019-12-01 RX ADMIN — GLYCOPYRROLATE 0.2 MG: 0.2 INJECTION, SOLUTION INTRAMUSCULAR; INTRAVENOUS at 11:34

## 2019-12-01 RX ADMIN — TAZOBACTAM SODIUM AND PIPERACILLIN SODIUM 3.38 G: 375; 3 INJECTION, SOLUTION INTRAVENOUS at 15:06

## 2019-12-01 RX ADMIN — FENTANYL CITRATE 75 MCG: 50 INJECTION, SOLUTION INTRAMUSCULAR; INTRAVENOUS at 10:51

## 2019-12-01 RX ADMIN — ESMOLOL HYDROCHLORIDE 10 MG: 10 INJECTION, SOLUTION INTRAVENOUS at 10:41

## 2019-12-01 RX ADMIN — TAZOBACTAM SODIUM AND PIPERACILLIN SODIUM 3.38 G: 375; 3 INJECTION, SOLUTION INTRAVENOUS at 08:01

## 2019-12-01 RX ADMIN — DOCUSATE SODIUM 100 MG: 100 CAPSULE, LIQUID FILLED ORAL at 21:09

## 2019-12-01 RX ADMIN — HYDROMORPHONE HYDROCHLORIDE 0.5 MG: 1 INJECTION, SOLUTION INTRAMUSCULAR; INTRAVENOUS; SUBCUTANEOUS at 12:21

## 2019-12-01 RX ADMIN — NEOSTIGMINE METHYLSULFATE 3 MG: 1 INJECTION, SOLUTION INTRAVENOUS at 11:25

## 2019-12-01 RX ADMIN — ONDANSETRON 4 MG: 2 INJECTION INTRAMUSCULAR; INTRAVENOUS at 00:27

## 2019-12-01 RX ADMIN — EPHEDRINE SULFATE 10 MG: 50 INJECTION INTRAMUSCULAR; INTRAVENOUS; SUBCUTANEOUS at 10:37

## 2019-12-01 RX ADMIN — SODIUM CHLORIDE 100 ML/HR: 9 INJECTION, SOLUTION INTRAVENOUS at 00:27

## 2019-12-01 RX ADMIN — DEXAMETHASONE SODIUM PHOSPHATE 8 MG: 4 INJECTION, SOLUTION INTRAMUSCULAR; INTRAVENOUS at 10:38

## 2019-12-01 RX ADMIN — MELOXICAM 7.5 MG: 7.5 TABLET ORAL at 16:10

## 2019-12-01 RX ADMIN — INSULIN HUMAN 3 UNITS: 100 INJECTION, SOLUTION PARENTERAL at 17:23

## 2019-12-01 RX ADMIN — GLYCOPYRROLATE 0.4 MG: 0.2 INJECTION, SOLUTION INTRAMUSCULAR; INTRAVENOUS at 11:25

## 2019-12-01 NOTE — PLAN OF CARE
Problem: Patient Care Overview  Goal: Plan of Care Review  Outcome: Ongoing (interventions implemented as appropriate)   12/01/19 0609   Coping/Psychosocial   Plan of Care Reviewed With patient   Plan of Care Review   Progress no change   OTHER   Outcome Summary pt having nausea; pain relieved with morphine; pt MRI screening faxed-awaiting MRI

## 2019-12-01 NOTE — ANESTHESIA POSTPROCEDURE EVALUATION
Patient: Wallace Dukes    Procedure Summary     Date:  12/01/19 Room / Location:   [facility]    Anesthesia Start:  1012 Anesthesia Stop:      Procedure:  CHOLECYSTECTOMY LAPAROSCOPIC (N/A Abdomen) Diagnosis:      Surgeon:  Rafiq Angelo MD Provider:  Adeel Louie MD    Anesthesia Type:  general ASA Status:  3          Anesthesia Type: general  Last vitals  BP   156/80   Temp   97.0   Pulse   80   Resp   16     SpO2   94%     Post Anesthesia Care and Evaluation    Patient location during evaluation: PACU  Patient participation: complete - patient participated  Level of consciousness: awake  Pain score: 0  Pain management: adequate  Airway patency: patent  Anesthetic complications: No anesthetic complications  PONV Status: none  Cardiovascular status: acceptable and stable  Respiratory status: nasal cannula, unassisted, acceptable and spontaneous ventilation  Hydration status: acceptable

## 2019-12-01 NOTE — PLAN OF CARE
Problem: Patient Care Overview  Goal: Plan of Care Review  Outcome: Ongoing (interventions implemented as appropriate)   12/01/19 1640   Coping/Psychosocial   Plan of Care Reviewed With patient   Plan of Care Review   Progress improving       Problem: Cholecystectomy (Adult)  Intervention: Monitor and Manage Acute Postoperative Pain   12/01/19 1640   Promote Oxygenation/Perfusion   Pain Management Interventions care clustered;pain management plan reviewed with patient/caregiver;pillow support provided;quiet environment facilitated;prescribed exercises encouraged

## 2019-12-01 NOTE — PLAN OF CARE
Problem: Pain, Acute (Adult)  Intervention: Monitor and Manage Analgesia   12/01/19 0349   Promote Effective Bowel Elimination   Bowel Intervention adequate fluid intake promoted;ambulation promoted;diet adjusted   Safety Management   Medication Review/Management medications reviewed;high risk medications identified

## 2019-12-01 NOTE — ED PROVIDER NOTES
"Subjective   Wallace Dukes is a 64 y.o. female who presents to the ED with c/o chest pain. The patient had similar episodes to today approximately a few weeks ago and 1 week ago but her symptoms receded both times so today is the first time she has sought medical treatment for an episode such as this one. She began having midsternal chest pain this morning at approximately 0400 with the pain radiating to her back and abdomen. The patient reports the constant, waxing and waning pain is \"uncomfortable and a ball sitting in my chest\" and although nothing exacerbates the pain, it is relieved by vomiting. She complains of nausea and vomiting since 0400, fever, chills, and hearing loss on the left side but denies shortness of breath, diaphoresis, and trouble swallowing. The patient had breast cancer in 2014 for which Dr. 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 She has a history of reflux for which she takes Pantoprazole for and she has had a hysterectomy in the past. The patient reports a moderate long distance travelling trip in 10/2019. She is not currently taking any anticoagulants. She does not have a current cardiologist. There are no other acute complaints at this time.        History provided by:  Patient  Chest Pain   Pain location:  Substernal area  Pain quality: pressure    Pain radiates to:  Upper back and epigastrium  Pain severity:  Moderate  Onset quality:  Sudden  Duration:  16 hours  Timing:  Constant  Progression:  Waxing and waning  Chronicity:  Recurrent  Relieved by: vomiting.  Worsened by:  Nothing  Ineffective treatments:  Rest  Associated symptoms: abdominal pain, back pain, fever, nausea and vomiting    Associated symptoms: no altered mental status, no diaphoresis, no dysphagia, no near-syncope, no numbness, no palpitations, no shortness of breath, " no syncope and no weakness    Risk factors: no coronary artery disease, no diabetes mellitus, no high cholesterol, no hypertension, not male, not pregnant, no prior DVT/PE and no smoking        Review of Systems   Constitutional: Positive for chills and fever. Negative for diaphoresis and unexpected weight change.   HENT: Positive for hearing loss (left sided). Negative for trouble swallowing.    Respiratory: Negative for shortness of breath.    Cardiovascular: Positive for chest pain. Negative for palpitations, syncope and near-syncope.   Gastrointestinal: Positive for abdominal pain, nausea and vomiting.   Musculoskeletal: Positive for back pain.   Neurological: Negative for syncope, weakness and numbness.   All other systems reviewed and are negative.      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       Family History   Problem Relation Age of Onset   • Hypertension Mother    • Diabetes Mother    • Migraines Mother    • Diabetes Sister    • Hypertension Brother    • Diabetes Son        Social History     Socioeconomic History   • Marital status:      Spouse name: Not on file   • Number of children: Not on file   • Years of education: Not on file   • Highest education level: Not on file   Tobacco Use   •  Smoking status: Never Smoker   • Smokeless tobacco: Never Used   Substance and Sexual Activity   • Alcohol use: No     Frequency: Never   • Drug use: No   • Sexual activity: Defer         Objective   Physical Exam   Constitutional: She is oriented to person, place, and time. She appears well-developed and well-nourished. No distress.   HENT:   Head: Normocephalic and atraumatic.   Eyes: Conjunctivae are normal. No scleral icterus.   Neck: Normal range of motion. Neck supple.   Cardiovascular: Normal rate, regular rhythm, normal heart sounds and intact distal pulses.   No murmur heard.  Pulmonary/Chest: Effort normal and breath sounds normal. No respiratory distress.   Abdominal: Soft. There is tenderness.   Mild tenderness to palpation of the upper abdomen.   Musculoskeletal: Normal range of motion. She exhibits no edema.        Right lower leg: Normal.        Left lower leg: Normal.   Neurological: She is alert and oriented to person, place, and time.   Skin: Skin is warm and dry.   Psychiatric: She has a normal mood and affect. Her behavior is normal.   Nursing note and vitals reviewed.      Procedures         ED Course  ED Course as of Nov 30 2201   Sat Nov 30, 2019 1954 BP: (!) 212/100 [RS]   1955 Temp: 100.3 °F (37.9 °C) [RS]   2014 WBC: (!) 14.45 [RS]   2039 Troponin T: <0.010 [RS]   2140 CT scan confirms the suspected diagnosis of acute cholecystitis with wall thickening and stranding.  No evidence of perforation or abscess.  No other emergent findings.  I personally reviewed the imaging and the CT read.  I talked with general surgery about the findings and he agrees to follow.  I updated the patient and family on the findings and plan for antibiotics and admission to the hospital.  They all voiced understanding and agree with the plan.  [RS]   2158 Case discussed with the hospitalist who agrees with the plan to admit.  [RS]      ED Course User Index  [RS] Rickie Preciado MD       Recent Results (from  the past 24 hour(s))   Troponin    Collection Time: 11/30/19  8:05 PM   Result Value Ref Range    Troponin T <0.010 0.000 - 0.030 ng/mL   Comprehensive Metabolic Panel    Collection Time: 11/30/19  8:05 PM   Result Value Ref Range    Glucose 128 (H) 65 - 99 mg/dL    BUN 12 8 - 23 mg/dL    Creatinine 0.93 0.57 - 1.00 mg/dL    Sodium 138 136 - 145 mmol/L    Potassium 3.6 3.5 - 5.2 mmol/L    Chloride 98 98 - 107 mmol/L    CO2 26.0 22.0 - 29.0 mmol/L    Calcium 9.8 8.6 - 10.5 mg/dL    Total Protein 7.3 6.0 - 8.5 g/dL    Albumin 4.20 3.50 - 5.20 g/dL    ALT (SGPT) 36 (H) 1 - 33 U/L    AST (SGOT) 26 1 - 32 U/L    Alkaline Phosphatase 123 (H) 39 - 117 U/L    Total Bilirubin 0.6 0.2 - 1.2 mg/dL    eGFR Non African Amer 61 >60 mL/min/1.73    Globulin 3.1 gm/dL    A/G Ratio 1.4 g/dL    BUN/Creatinine Ratio 12.9 7.0 - 25.0    Anion Gap 14.0 5.0 - 15.0 mmol/L   Lipase    Collection Time: 11/30/19  8:05 PM   Result Value Ref Range    Lipase 36 13 - 60 U/L   BNP    Collection Time: 11/30/19  8:05 PM   Result Value Ref Range    proBNP 100.9 5.0 - 900.0 pg/mL   Light Blue Top    Collection Time: 11/30/19  8:05 PM   Result Value Ref Range    Extra Tube hold for add-on    Green Top (Gel)    Collection Time: 11/30/19  8:05 PM   Result Value Ref Range    Extra Tube Hold for add-ons.    Lavender Top    Collection Time: 11/30/19  8:05 PM   Result Value Ref Range    Extra Tube hold for add-on    Gold Top - SST    Collection Time: 11/30/19  8:05 PM   Result Value Ref Range    Extra Tube Hold for add-ons.    CBC Auto Differential    Collection Time: 11/30/19  8:05 PM   Result Value Ref Range    WBC 14.45 (H) 3.40 - 10.80 10*3/mm3    RBC 4.32 3.77 - 5.28 10*6/mm3    Hemoglobin 13.1 12.0 - 15.9 g/dL    Hematocrit 39.7 34.0 - 46.6 %    MCV 91.9 79.0 - 97.0 fL    MCH 30.3 26.6 - 33.0 pg    MCHC 33.0 31.5 - 35.7 g/dL    RDW 13.0 12.3 - 15.4 %    RDW-SD 44.3 37.0 - 54.0 fl    MPV 9.1 6.0 - 12.0 fL    Platelets 412 140 - 450 10*3/mm3    Neutrophil  % 76.7 (H) 42.7 - 76.0 %    Lymphocyte % 17.2 (L) 19.6 - 45.3 %    Monocyte % 4.6 (L) 5.0 - 12.0 %    Eosinophil % 0.4 0.3 - 6.2 %    Basophil % 0.5 0.0 - 1.5 %    Immature Grans % 0.6 (H) 0.0 - 0.5 %    Neutrophils, Absolute 11.09 (H) 1.70 - 7.00 10*3/mm3    Lymphocytes, Absolute 2.49 0.70 - 3.10 10*3/mm3    Monocytes, Absolute 0.66 0.10 - 0.90 10*3/mm3    Eosinophils, Absolute 0.06 0.00 - 0.40 10*3/mm3    Basophils, Absolute 0.07 0.00 - 0.20 10*3/mm3    Immature Grans, Absolute 0.08 (H) 0.00 - 0.05 10*3/mm3    nRBC 0.0 0.0 - 0.2 /100 WBC   Procalcitonin    Collection Time: 11/30/19  8:05 PM   Result Value Ref Range    Procalcitonin 0.08 (L) 0.10 - 0.25 ng/mL   Lactic Acid, Plasma    Collection Time: 11/30/19  8:47 PM   Result Value Ref Range    Lactate 1.7 0.5 - 2.0 mmol/L   Urinalysis With Microscopic If Indicated (No Culture) - Urine, Clean Catch    Collection Time: 11/30/19  9:23 PM   Result Value Ref Range    Color, UA Yellow Yellow, Straw    Appearance, UA Clear Clear    pH, UA 5.5 5.0 - 8.0    Specific Gravity, UA 1.020 1.001 - 1.030    Glucose, UA Negative Negative    Ketones, UA Trace (A) Negative    Bilirubin, UA Negative Negative    Blood, UA Negative Negative    Protein, UA Negative Negative    Leuk Esterase, UA Negative Negative    Nitrite, UA Negative Negative    Urobilinogen, UA 1.0 E.U./dL 0.2 - 1.0 E.U./dL     Note: In addition to lab results from this visit, the labs listed above may include labs taken at another facility or during a different encounter within the last 24 hours. Please correlate lab times with ED admission and discharge times for further clarification of the services performed during this visit.    CT Abdomen Pelvis Without Contrast   Final Result      1. Cholelithiasis with CT findings suspicious for early acute cholecystitis. At least one stone in the region of the gallbladder neck. Differential technically includes the possibility of acute duodenitis or pancreatitis both of  "which are felt to be less   likely. No intra or extrahepatic biliary ductal dilatation.   2. Hepatic steatosis.   3. Surgical absence of the uterus.               Signer Name: Pete Mahan MD    Signed: 11/30/2019 9:05 PM    Workstation Name: [facility]     Radiology [facility]      XR Chest 1 View   Final Result      1. Borderline cardiac size, otherwise negative chest.      Signer Name: Pete Mahan MD    Signed: 11/30/2019 8:58 PM    Workstation Name: [facility]        Vitals:    11/30/19 1945 11/30/19 2008   BP: (!) 212/100 159/100   Pulse: 91    Resp: 18    Temp: 100.3 °F (37.9 °C)    SpO2: 95%    Weight: 112 kg (247 lb)    Height: 165.1 cm (65\")      Medications   sodium chloride 0.9 % flush 10 mL (not administered)   piperacillin-tazobactam (ZOSYN) 3.375 g in iso-osmotic dextrose 50 ml (premix) (not administered)   fentaNYL citrate (PF) (SUBLIMAZE) injection 50 mcg (not administered)   aspirin chewable tablet 324 mg (324 mg Oral Given 11/30/19 2009)   famotidine (PEPCID) injection 20 mg (20 mg Intravenous Given 11/30/19 2028)   aluminum-magnesium hydroxide-simethicone (MAALOX MAX) 400-400-40 MG/5ML suspension 15 mL (15 mL Oral Given 11/30/19 2029)   Lidocaine Viscous HCl (XYLOCAINE) 2 % mouth solution 15 mL (15 mL Mouth/Throat Given 11/30/19 2030)   ondansetron (ZOFRAN) injection 4 mg (4 mg Intravenous Given 11/30/19 2027)   sodium chloride 0.9 % bolus 500 mL (0 mL Intravenous Stopped 11/30/19 2056)   acetaminophen (TYLENOL) tablet 1,000 mg (1,000 mg Oral Given 11/30/19 2026)     ECG/EMG Results (last 24 hours)     Procedure Component Value Units Date/Time    ECG 12 Lead [[id number]] Collected:  11/30/19 1957     Updated:  11/30/19 1957        ECG 12 Lead   Final Result   Test Reason : CHEST PAIN   Blood Pressure : **/** mmHG   Vent. Rate : 086 BPM     Atrial Rate : 086 BPM      P-R Int : 144 ms          QRS Dur : 074 ms       QT Int : 368 ms       " P-R-T Axes : 039 002 045 degrees      QTc Int : 440 ms      Normal sinus rhythm   Normal ECG   No previous ECGs available   Confirmed by LINDA PRECIADO MD ([id number]) on 11/30/2019 9:55:38 PM      Referred By:  carmina           Confirmed By:LINDA PRECIADO MD                        MDM  Number of Diagnoses or Management Options  Acute calculous cholecystitis:   Acute febrile illness:   Class 3 severe obesity due to excess calories without serious comorbidity with body mass index (BMI) of 40.0 to 44.9 in adult (CMS/HCC):   Epigastric abdominal pain:      Amount and/or Complexity of Data Reviewed  Clinical lab tests: reviewed  Tests in the radiology section of CPT®: reviewed  Discuss the patient with other providers: yes  Independent visualization of images, tracings, or specimens: yes        Final diagnoses:   Acute calculous cholecystitis   Acute febrile illness   Epigastric abdominal pain   Class 3 severe obesity due to excess calories without serious comorbidity with body mass index (BMI) of 40.0 to 44.9 in adult (CMS/HCC)   Elevated blood pressure reading with diagnosis of hypertension       Documentation assistance provided by jesika Dan.  Information recorded by the jesika was done at my direction and has been verified and validated by me.     Jh Dan  11/30/19 2015       Linda Preciado MD  11/30/19 2156       Linda Preciado MD  11/30/19 2201

## 2019-12-01 NOTE — ANESTHESIA PROCEDURE NOTES
Airway  Urgency: elective    Date/Time: 12/1/2019 10:20 AM  Airway not difficult    General Information and Staff    Patient location during procedure: OR  CRNA: Austin Marshall CRNA    Indications and Patient Condition  Indications for airway management: airway protection    Preoxygenated: yes  MILS not maintained throughout  Mask difficulty assessment: 3 - difficult mask (inadequate, unstable or two providers) +/- NMBA    Final Airway Details  Final airway type: endotracheal airway      Successful airway: ETT  Cuffed: yes   Successful intubation technique: direct laryngoscopy  Facilitating devices/methods: Bougie and anterior pressure/BURP  Endotracheal tube insertion site: oral  Blade: Velasquez  Blade size: 2  ETT size (mm): 7.0  Cormack-Lehane Classification: grade III - view of epiglottis only  Placement verified by: chest auscultation and capnometry   Cuff volume (mL): 5  Measured from: lips  ETT/EBT  to lips (cm): 21  Number of attempts at approach: 2  Assessment: lips, teeth, and gum same as pre-op and atraumatic intubation    Additional Comments  Negative epigastric sounds, Breath sound equal bilaterally with symmetric chest rise and fall

## 2019-12-01 NOTE — ANESTHESIA PREPROCEDURE EVALUATION
Anesthesia Evaluation     Patient summary reviewed and Nursing notes reviewed   no history of anesthetic complications:  NPO Solid Status: > 8 hours  NPO Liquid Status: > 2 hours           Airway   Mallampati: II  TM distance: >3 FB  Neck ROM: full  No difficulty expected  Dental - normal exam     Pulmonary - negative pulmonary ROS and normal exam    breath sounds clear to auscultation  Cardiovascular - normal exam    ECG reviewed  Rhythm: regular  Rate: normal    (+) hypertension,       Neuro/Psych    ROS Comment: S/p resection of accoustic neuroma  GI/Hepatic/Renal/Endo    (+) morbid obesity, GERD well controlled,  diabetes mellitus type 2,     Musculoskeletal (-) negative ROS    Abdominal    Substance History      OB/GYN          Other      history of cancer (Breast ca s/p mastectomy + lymphadenectomy on right)                    Anesthesia Plan    ASA 3     general     intravenous induction     Anesthetic plan, all risks, benefits, and alternatives have been provided, discussed and informed consent has been obtained with: patient.    Plan discussed with CRNA.

## 2019-12-01 NOTE — OP NOTE
General Surgery Operative Note    Wallace Dukes  [id number]  1955    Date of Surgery:  12/1/2019 11:41 AM    Pre-op Diagnosis: Acute calculus cholecystitis    Post-op Diagnosis: Acute calculus cholecystitis    Procedure: Laparoscopic cholecystectomy    Surgeon: Rafiq Angelo MD    Assistant: None    Anesthesia: General    Fluids: 1 L of crystalloid    Estimated Blood Loss: Less than 25 mL    Urine Voided: Not recorded     Specimens:  Gallbladder                  Drains: None    Findings: Acute calculus cholecystitis    Complications: None apparent    History: 64-year-old lady presented with worsening epigastric and right upper quadrant abdominal pain to the emergency room.  Work-up demonstrated likely acute cholecystitis, normal liver function tests.      I rehashed the risks and benefits of cholecystectomy, including but not limited to: bleeding, infection, injury to adjacent viscera (small bowel, large bowel, duodenum, common bile duct, hepatic artery, the liver), bile leak, retained stones, need for ERCP, an open operation and general, and medical issues from a cardiopulmonary venous thrombosis standpoint.    Procedure:      After informed consent the patient was taken to the operating room.  They were placed in the supine position on the operating table, general anesthesia administered, and the abdomen was then prepped and draped in the standard sterile fashion. A timeout was performed.      The operation began with a upper midline Charles trocar cutdown.  The skin was anesthetized and incised, the peritoneum entered sharply under direct visualization, bilateral 0 Vicryl fascial sutures were placed, and the blunt-tipped Charles trocar was placed intra-abdominally.  The abdomen was insufflated and the patient was placed in the head up position and rolled slightly onto the left hand side.  I then placed a subxiphoid port and two 5 mm trocars in the right upper quadrant under direct  visualization.     There were adhesions to the liver edge which were taken down with the [place] LigaSure.  The fundus of the gallbladder appeared inflamed.  The gallbladder was quite tense and was decompressed with a laparoscopic needle.  There was return of purulent bile, sent for culture.  The fundus of the gallbladder was grasped and retracted cephalad up over the liver edge.  The neck of the gallbladder was then retracted inferior laterally.  The peritoneal attachments to the cystic duct and cystic artery were stripped down and a meticulous dissection demonstrated the critical view.  I then placed 2 clips proximally and one distally on the cystic duct, 3 clips proximally on the cystic artery and one distally.  The laparoscopic scissors were then used to transect these structures and removed the gallbladder from the gallbladder bed.  I placed the gallbladder into an Endo Catch bag and withdrew this from the abdomen.  I reinspected my clips, they were without bile leak or bleeding and the gallbladder fossa was similar. I inspected my umbilical port site and this was without obvious complication.  All trocars were removed under direct visualization without bleeding.  The periumbilical fascial defect was closed with 0 Vicryl.  All skin incisions were closed with 4-0 Monocryl, Mastisol, Steri-Strips, Telfa, and Tegaderms.       The lap and needle count was correct at the end of the procedure ×2. The patient was then transferred to the recovery room in stable condition.   Rafiq Angelo MD     Date: 12/1/2019  Time: 11:41 AM

## 2019-12-01 NOTE — PLAN OF CARE
Problem: Cholecystectomy (Adult)  Intervention: Prevent/Manage DVT/VTE Risk   12/01/19 1641   Interventions   VTE Prevention/Management right;left;bilateral;compression stockings on

## 2019-12-01 NOTE — H&P
[facility] Medicine Services  HISTORY AND PHYSICAL    Patient Name: Wallace Dukes  : 1955  MRN: [id number]  Primary Care Physician: Domingo Sood MD  Date of admission: 2019      Subjective   Subjective     Chief Complaint:  Abdominal pain    HPI:  Wallace Dukes is a 64 y.o. female a PMH significant for history of breast cancer post mastectomy, history of MRSA postoperative wound infection, history of schwannoma status post left translabyrinthine craniotomy presents to the ED with abdominal pain, nausea vomiting.  Patient states that this morning around 3:57 AM she was awakened with severe epigastric/midsternal chest and abdominal pain radiating into her back.  Her pain continued throughout the day and she began to experience nausea and vomiting this afternoon.  She reports associated body aches, chills.  She presented to the ED for further evaluation.  She reports 3 similar less severe episodes over the past 1 to 2 months which resolved on their own.    Review of Systems   Constitutional: Positive for appetite change and chills.   HENT: Negative.    Eyes: Negative.    Respiratory: Negative.    Cardiovascular: Positive for chest pain.   Gastrointestinal: Positive for abdominal pain, nausea and vomiting.   Endocrine: Negative.    Genitourinary: Negative.    Musculoskeletal: Negative.    Skin: Negative.    Allergic/Immunologic: Negative.    Neurological: Negative.    Hematological: Negative.    Psychiatric/Behavioral: Negative.         All other systems reviewed and are negative.     Personal History     Past Medical History:   Diagnosis Date   • Cancer of breast (CMS/HCC) 2014   • Chest pain 2009    stress nuclear   • MRSA (methicillin resistant Staphylococcus aureus)     infection of right breast expander; bilateral removal of expanders   • Schwannoma 2016    left translabyrinthine craniotomy       Past Surgical History:   Procedure Laterality Date   •  BREAST RECONSTRUCTION, BREAST TISSUE EXPANDER INSERTION Bilateral 07/2013   • BREAST TISSUE EXPANDER REMOVAL INSERTION OF IMPLANT Bilateral     bilateral removal of expanders   • CRANIOTOMY FOR TUMOR  06/2016    Left Translabyrithine craniotomy   • HYSTERECTOMY  1986   • MASTECTOMY Bilateral 2014    complicated by MRSA wound infection on right requiring removal of expander.       Family History: family history includes Diabetes in her mother, sister, and son; Hypertension in her brother and mother; Migraines in her mother. Otherwise pertinent FHx was reviewed and unremarkable.     Social History:  reports that she has never smoked. She has never used smokeless tobacco. She reports that she does not drink alcohol or use drugs.  Social History     Social History Narrative   • Not on file       Medications:  Available home medication information reviewed.  Medications Prior to Admission   Medication Sig Dispense Refill Last Dose   • atorvastatin (LIPITOR) 40 MG tablet TAKE 1 TABLET BY MOUTH EVERY DAY 90 tablet 1 11/29/2019 at Unknown time   • cholecalciferol (VITAMIN D3) 1000 units tablet Take 3 tablets by mouth Every Morning.   11/30/2019 at Unknown time   • escitalopram (LEXAPRO) 10 MG tablet TAKE 1 TABLET BY MOUTH EVERY DAY 90 tablet 1 11/30/2019 at Unknown time   • loratadine (CLARITIN) 10 MG tablet Take 1 tablet by mouth Daily.   11/30/2019 at Unknown time   • metFORMIN (GLUCOPHAGE) 1000 MG tablet Take 1 tablet by mouth 2 (Two) Times a Day With Meals. 60 tablet 5 11/30/2019 at Unknown time   • Multiple Vitamins-Minerals (MULTIVITAMIN ADULT PO) Take 1 tablet by mouth Daily.   11/30/2019 at Unknown time   • pantoprazole (PROTONIX) 40 MG EC tablet TAKE 1 TABLET BY MOUTH TWICE A  tablet 1 11/30/2019 at Unknown time   • ALBUTEROL SULFATE  (90 Base) MCG/ACT inhaler INHALE 2 PUFFS EVERY 4 (FOUR) HOURS AS NEEDED FOR WHEEZING OR SHORTNESS OF AIR. 18 inhaler 3 Taking   • dextromethorphan-guaifenesin (MUCINEX  DM MAX STRENGTH)  MG per 12 hr tablet Take  by mouth As Needed.   More than a month at Unknown time   • docusate sodium (STOOL SOFTENER) 100 MG capsule Take 1 capsule by mouth Daily As Needed.   Taking   • lisinopril (PRINIVIL,ZESTRIL) 10 MG tablet TAKE 1 TABLET BY MOUTH EVERY DAY 90 tablet 1 Taking       No Known Allergies    Objective   Objective     Vital Signs:   Temp:  [98.8 °F (37.1 °C)-100.3 °F (37.9 °C)] 98.8 °F (37.1 °C)  Heart Rate:  [83-91] 83  Resp:  [18] 18  BP: (130-212)/() 141/90        Physical Exam   Constitutional: Awake, alert  Eyes: PERRLA, sclerae anicteric, no conjunctival injection  HENT: NCAT, mucous membranes moist  Neck: Supple, no thyromegaly, no lymphadenopathy, trachea midline  Respiratory: Clear to auscultation bilaterally, nonlabored respirations   Cardiovascular: RRR, no murmurs, rubs, or gallops, palpable pedal pulses bilaterally  Gastrointestinal: Positive bowel sounds, soft, mildly tender in the right upper quadrant, obese abdomen  Musculoskeletal: No bilateral ankle edema, no clubbing or cyanosis to extremities  Psychiatric: Appropriate affect, cooperative  Neurologic: Oriented x 3, strength symmetric in all extremities, Cranial Nerves grossly intact to confrontation, speech clear  Skin: No rashes      Results Reviewed:  I have personally reviewed current lab and radiology data.    Results from last 7 days   Lab Units 11/30/19 2005   WBC 10*3/mm3 14.45*   HEMOGLOBIN g/dL 13.1   HEMATOCRIT % 39.7   PLATELETS 10*3/mm3 412     Results from last 7 days   Lab Units 11/30/19 2047 11/30/19 2005   SODIUM mmol/L  --  138   POTASSIUM mmol/L  --  3.6   CHLORIDE mmol/L  --  98   CO2 mmol/L  --  26.0   BUN mg/dL  --  12   CREATININE mg/dL  --  0.93   GLUCOSE mg/dL  --  128*   CALCIUM mg/dL  --  9.8   ALT (SGPT) U/L  --  36*   AST (SGOT) U/L  --  26   TROPONIN T ng/mL  --  <0.010   PROBNP pg/mL  --  100.9   LACTATE mmol/L 1.7  --    PROCALCITONIN ng/mL  --  0.08*     Estimated  Creatinine Clearance: 75.2 mL/min (by C-G formula based on SCr of 0.93 mg/dL).  Brief Urine Lab Results  (Last result in the past 365 days)      Color   Clarity   Blood   Leuk Est   Nitrite   Protein   CREAT   Urine HCG        11/30/19 2123 Yellow Clear Negative Negative Negative Negative             Imaging Results (Last 24 Hours)     Procedure Component Value Units Date/Time    CT Abdomen Pelvis Without Contrast [[id number]] Collected:  11/30/19 2105     Updated:  11/30/19 2108    Narrative:       CT Abdomen Pelvis WO    INDICATION:   Epigastric pain nausea and vomiting today. History of breast cancer status post hysterectomy.    TECHNIQUE:   CT of the abdomen and pelvis without IV contrast. Coronal and sagittal reconstructions were obtained.  Radiation dose reduction techniques included automated exposure control or exposure modulation based on body size. Count of known CT and cardiac nuc  med studies performed in previous 12 months: 0.     COMPARISON:   None available.    FINDINGS:  Abdomen: There is old healed granulomatous disease. Minimal atelectasis in the lower lobes. Normal caliber aorta. The spleen and adrenal glands are unremarkable. The kidneys are nonobstructed. There is at least mild hepatic steatosis.    There is extensive cholelithiasis and the gallbladder is distended up to 4.8 cm. There is some subtle stranding in the region of the gallbladder neck extending into the pancreaticoduodenal groove and imaging findings are suspicious for early changes of  acute cholecystitis. There is at least one stone within the gallbladder neck. Laboratory data would be complimentary. Acute duodenitis or pancreatitis are less likely differential considerations. No adenopathy or drainable fluid collection.    Pelvis: Unremarkable bladder and the uterus is surgically absent. No drainable fluid collection in the pelvis. Diverticulosis. Diminutive but normal appendix. Negative inguinal canals. No suspicious bone  lesion.      Impression:         1. Cholelithiasis with CT findings suspicious for early acute cholecystitis. At least one stone in the region of the gallbladder neck. Differential technically includes the possibility of acute duodenitis or pancreatitis both of which are felt to be less  likely. No intra or extrahepatic biliary ductal dilatation.  2. Hepatic steatosis.  3. Surgical absence of the uterus.          Signer Name: Pete Mahan MD   Signed: 11/30/2019 9:05 PM   Workstation Name: [facility]    Radiology [facility]    XR Chest 1 View [[id number]] Collected:  11/30/19 2058     Updated:  11/30/19 2100    Narrative:       CR Chest 1 Vw    INDICATION:   Midsternal chest pain that began this morning. Nausea and vomiting since 0400 hours. History of mastectomy bilaterally for breast cancer.     COMPARISON:    4/9/2019    FINDINGS:  Single portable AP view(s) of the chest.  Cardiac silhouette is borderline enlarged and stable. Unremarkable vascularity. There is some probable atelectasis in the lower lung zone on the left. No dense consolidation or effusion. No pneumothorax.      Impression:         1. Borderline cardiac size, otherwise negative chest.    Signer Name: Pete Mahan MD   Signed: 11/30/2019 8:58 PM   Workstation Name: [facility]    Radiology [facility]             Assessment/Plan   Assessment / Plan     Active Hospital Problems    Diagnosis POA   • **Acute calculous cholecystitis [K80.00] Yes   • Major depressive disorder with single episode, in partial remission (CMS/HCC) [F32.4] Yes   • Venous (peripheral) insufficiency [I87.2] Yes   • Morbid obesity (CMS/HCC) [E66.01] Yes   • Mixed hyperlipidemia [E78.2] Yes   • GERD (gastroesophageal reflux disease) [K21.9] Yes   • Diabetic neuropathy associated with type 2 diabetes mellitus (CMS/HCC) [E11.40] Yes   • Benign essential hypertension [I10] Yes   This is a 64-year-old female patient with a PMH significant for  depression, HLD, GERD, diabetes mellitus who presents to the ED due to abdominal pain, nausea and vomiting found to have acute cholecystitis.    Acute cholecystitis  -N.p.o. after midnight  - General surgery consultation  -Zosyn  - IV fluids  -Zofran PRN for nausea vomiting, morphine as needed for abdominal pain  - Blood cultures ordered  - A.m. labs  -MRCP    Diabetes mellitus type 2  -Hold home metformin  -Hemoglobin A1c in a.m.  -Sliding scale insulin with Accu-Cheks    HTN/HLD  -Not on home antihypertensive currently.  We will add PRN meds as needed  - Hold home statin while n.p.o.    Depression  -Continue home Lexapro      DVT prophylaxis: SCDs    CODE STATUS:    Code Status and Medical Interventions:   Ordered at: 11/30/19 2427     Level Of Support Discussed With:    Patient     Code Status:    CPR     Medical Interventions (Level of Support Prior to Arrest):    Full       Admission Status:  I believe this patient meets INPATIENT status due to acute cholecystitis requiring IV antibiotics and inpatient surgical evaluation.  I feel patient’s risk for adverse outcomes and need for care warrant INPATIENT evaluation and I predict the patient’s care encounter to likely last beyond 2 midnights.    Electronically signed by Gwendolyn Roldan DO, 12/01/19, 1:24 AM.

## 2019-12-01 NOTE — CONSULTS
General Surgery Consultation Note    Date of Service: 12/1/2019  Wallace Dukes  [id number]  1955      Referring Provider: Svetlana Singletary DO    Location of Consult: Hospital floor     Reason for Consultation: Acute cholecystitis       History of Present Illness:  I am seeing, Wallace Dukes, in consultation for Svetlana Singletary DO regarding acute cholecystitis.  64-year-old lady with past medical history significant for orbit obesity presented with the acute onset of abdominal pain yesterday.  The pain is in the epigastric location and is sharp in nature.  This is been associated with nausea and vomiting.  Her vomitus has been nonbilious and nonbloody.  She denies any significant change in bowel habits with diarrhea or constipation.  She has had no fever nor chills.  She has had pain similar to this in the past though there is no inciting event that she could delineate.      Problem List Items Addressed This Visit        Digestive    * (Principal) Acute calculous cholecystitis - Primary      Other Visit Diagnoses     Acute febrile illness        Epigastric abdominal pain        Class 3 severe obesity due to excess calories without serious comorbidity with body mass index (BMI) of 40.0 to 44.9 in adult (CMS/[facility])        Elevated blood pressure reading with diagnosis of hypertension              Past Medical History:   Diagnosis Date   • Cancer of breast (CMS/[facility]) 2014   • Chest pain 2009    stress nuclear   • MRSA (methicillin resistant Staphylococcus aureus)     infection of right breast expander; bilateral removal of expanders   • Schwannoma 06/2016    left translabyrinthine craniotomy       Past Surgical History:   • BREAST RECONSTRUCTION, BREAST TISSUE EXPANDER INSERTION   • BREAST TISSUE EXPANDER REMOVAL INSERTION OF IMPLANT    bilateral removal of expanders   • CRANIOTOMY FOR TUMOR    Left Translabyrithine craniotomy   • HYSTERECTOMY   • MASTECTOMY    complicated by MRSA wound infection on right  requiring removal of expander.       No Known Allergies    No current facility-administered medications on file prior to encounter.      Current Outpatient Medications on File Prior to Encounter   Medication Sig Dispense Refill   • atorvastatin (LIPITOR) 40 MG tablet TAKE 1 TABLET BY MOUTH EVERY DAY 90 tablet 1   • cholecalciferol (VITAMIN D3) 1000 units tablet Take 3 tablets by mouth Every Morning.     • escitalopram (LEXAPRO) 10 MG tablet TAKE 1 TABLET BY MOUTH EVERY DAY 90 tablet 1   • loratadine (CLARITIN) 10 MG tablet Take 1 tablet by mouth Daily.     • metFORMIN (GLUCOPHAGE) 1000 MG tablet Take 1 tablet by mouth 2 (Two) Times a Day With Meals. 60 tablet 5   • Multiple Vitamins-Minerals (MULTIVITAMIN ADULT PO) Take 1 tablet by mouth Daily.     • pantoprazole (PROTONIX) 40 MG EC tablet TAKE 1 TABLET BY MOUTH TWICE A  tablet 1   • ALBUTEROL SULFATE  (90 Base) MCG/ACT inhaler INHALE 2 PUFFS EVERY 4 (FOUR) HOURS AS NEEDED FOR WHEEZING OR SHORTNESS OF AIR. 18 inhaler 3   • dextromethorphan-guaifenesin (MUCINEX DM MAX STRENGTH)  MG per 12 hr tablet Take  by mouth As Needed.     • docusate sodium (STOOL SOFTENER) 100 MG capsule Take 1 capsule by mouth Daily As Needed.     • lisinopril (PRINIVIL,ZESTRIL) 10 MG tablet TAKE 1 TABLET BY MOUTH EVERY DAY 90 tablet 1         Current Facility-Administered Medications:   •  dextrose (D50W) 25 g/ 50mL Intravenous Solution 25 g, 25 g, Intravenous, Q15 Min PRN, Jamar, Gwendolyn G, DO  •  dextrose (GLUTOSE) oral gel 15 g, 15 g, Oral, Q15 Min PRN, Jamar, Gwendolyn G, DO  •  glucagon (human recombinant) (GLUCAGEN DIAGNOSTIC) injection 1 mg, 1 mg, Subcutaneous, Q15 Min PRN, Jamar, Gwendolyn G, DO  •  insulin regular (humuLIN R,novoLIN R) injection 0-7 Units, 0-7 Units, Subcutaneous, Q6H, Sanaz Eugenio H IV, RPH  •  Morphine sulfate (PF) injection 3 mg, 3 mg, Intravenous, Q3H PRN, 3 mg at 12/01/19 0624 **AND** naloxone (NARCAN) injection 0.4 mg, 0.4 mg, Intravenous, Q5  Min PRN, Jamar, Gwendolyn G, DO  •  ondansetron (ZOFRAN) injection 4 mg, 4 mg, Intravenous, Q6H PRN, Jamar, Gwendolyn G, DO, 4 mg at 12/01/19 0027  •  piperacillin-tazobactam (ZOSYN) 3.375 g in iso-osmotic dextrose 50 ml (premix), 3.375 g, Intravenous, Q8H, Jamar, Gwendolyn G, DO, 3.375 g at 12/01/19 0801  •  sodium chloride 0.9 % flush 10 mL, 10 mL, Intravenous, PRN, Rickie Preciado MD  •  sodium chloride 0.9 % flush 10 mL, 10 mL, Intravenous, Q12H, Jamar, Gwendolyn G, DO, 10 mL at 12/01/19 0028  •  sodium chloride 0.9 % flush 10 mL, 10 mL, Intravenous, PRN, Jamar, Gwendolyn G, DO  •  sodium chloride 0.9 % infusion, 100 mL/hr, Intravenous, Continuous, Jamar, Gwendolyn G, DO, Last Rate: 100 mL/hr at 12/01/19 0027, 100 mL/hr at 12/01/19 0027    Family History   Problem Relation Age of Onset   • Hypertension Mother    • Diabetes Mother    • Migraines Mother    • Diabetes Sister    • Hypertension Brother    • Diabetes Son      Social History     Socioeconomic History   • Marital status:      Spouse name: Not on file   • Number of children: Not on file   • Years of education: Not on file   • Highest education level: Not on file   Tobacco Use   • Smoking status: Never Smoker   • Smokeless tobacco: Never Used   Substance and Sexual Activity   • Alcohol use: No     Frequency: Never   • Drug use: No   • Sexual activity: Defer       Review of Systems:  Review of Systems   Constitutional: Positive for appetite change. Negative for chills and fever.   HENT: Negative for congestion, facial swelling and postnasal drip.    Eyes: Negative for photophobia and visual disturbance.   Respiratory: Negative for cough, chest tightness and shortness of breath.    Cardiovascular: Negative for chest pain and leg swelling.   Gastrointestinal: Positive for abdominal pain, nausea and vomiting. Negative for constipation.   Endocrine: Negative for polyphagia and polyuria.   Genitourinary: Negative for difficulty urinating, flank pain and hematuria.  "  Musculoskeletal: Negative for back pain, joint swelling and neck pain.   Skin: Negative for pallor and rash.   Allergic/Immunologic: Negative for immunocompromised state.   Neurological: Negative for syncope and light-headedness.   Hematological: Negative for adenopathy.   Psychiatric/Behavioral: Negative for behavioral problems, dysphoric mood and sleep disturbance.     Otherwise the 12 point review of systems is negative.    /85 (BP Location: Left arm, Patient Position: Lying)   Pulse 68   Temp 98.2 °F (36.8 °C) (Oral)   Resp 16   Ht 165.1 cm (65\")   Wt 112 kg (247 lb)   SpO2 96%   BMI 41.10 kg/m²   Body mass index is 41.1 kg/m².    General: Mild distress  HEENT: PER, no icterus, normal sclerae  Cardiac: regular rhythm,  no audible rubs  Pulmonary: bilateral breath sounds, non labored  Abdominal: Obese, tenderness in the right upper quadrant, no generalized peritonitis  Neurologic: awake, alert, no obvious focal deficits  Extremities: warm, no edema  Skin: no obvious rashes nor worrisome lesions seen     CBC  Results from last 7 days   Lab Units 12/01/19  0512   WBC 10*3/mm3 8.12   HEMOGLOBIN g/dL 11.3*   HEMATOCRIT % 35.4   PLATELETS 10*3/mm3 331       CMP  Results from last 7 days   Lab Units 12/01/19  0512   SODIUM mmol/L 140   POTASSIUM mmol/L 3.7   CHLORIDE mmol/L 101   CO2 mmol/L 27.0   BUN mg/dL 11   CREATININE mg/dL 0.97   CALCIUM mg/dL 8.5*   BILIRUBIN mg/dL 0.5   ALK PHOS U/L 99   ALT (SGPT) U/L 36*   AST (SGOT) U/L 38*   GLUCOSE mg/dL 123*       Radiology  Imaging Results (Last 72 Hours)     Procedure Component Value Units Date/Time    MRI abdomen wo contrast mrcp [[id number]] Collected:  12/01/19 0752     Updated:  12/01/19 0754    Narrative:       INDICATION:    Cholelithiasis. Acute cholecystitis. Fever and epigastric pain. Morbid obesity. Abdominal pain nausea and vomiting onset of symptoms around 4:00 AM on 11/30/2019.    TECHNIQUE:   MRI abdomen/MRCP performed without " contrast.    COMPARISON:    Abdomen pelvis CT 11/30/2019.    FINDINGS:  There are multiple gallstones within the gallbladder and the gallbladder is distended with apparent gallbladder wall thickening/edema. Findings are consistent with clinically suspected acute cholecystitis. There is also edema in the fat adjacent to the  gallbladder neck and second portion of the duodenum there is discontinuity in visualization of the cystic duct to gallbladder neck probably corresponding to the site of obstructing stone on the earlier CT scan in the gallbladder neck. There is some  apparent wall thickening of the proximal duodenum which raises concern for duodenitis versus some reactive edema related to the adjacent gallbladder inflammation. There is no intrahepatic or extrahepatic biliary ductal dilatation. No filling defect is  appreciated within the common bile.      Impression:         1. The gallbladder is distended and there are innumerable stones within it. There is gallbladder wall thickening/edema as well as edema in the adjacent proximal pericholecystic fat and edema in the fat surrounding the second portion of the duodenum.  Findings are consistent with acute cholecystitis and possibly milder component of duodenitis.  2. There is no intrahepatic or extrahepatic biliary ductal dilatation. No filling defect is seen within the common bile duct.  3. There is discontinuity of signal between the neck of the gallbladder and the cystic duct likely corresponding to the site of obstructing stone within the gallbladder neck seen on the earlier CT scan.     Signer Name: Pam Nguyễn MD   Signed: 12/1/2019 7:52 AM   Workstation Name: [email or web address]    [facility]    CT Abdomen Pelvis Without Contrast [[id number]] Collected:  11/30/19 2105     Updated:  11/30/19 2108    Narrative:       CT Abdomen Pelvis WO    INDICATION:   Epigastric pain nausea and vomiting today. History of breast cancer status post  hysterectomy.    TECHNIQUE:   CT of the abdomen and pelvis without IV contrast. Coronal and sagittal reconstructions were obtained.  Radiation dose reduction techniques included automated exposure control or exposure modulation based on body size. Count of known CT and cardiac nuc  med studies performed in previous 12 months: 0.     COMPARISON:   None available.    FINDINGS:  Abdomen: There is old healed granulomatous disease. Minimal atelectasis in the lower lobes. Normal caliber aorta. The spleen and adrenal glands are unremarkable. The kidneys are nonobstructed. There is at least mild hepatic steatosis.    There is extensive cholelithiasis and the gallbladder is distended up to 4.8 cm. There is some subtle stranding in the region of the gallbladder neck extending into the pancreaticoduodenal groove and imaging findings are suspicious for early changes of  acute cholecystitis. There is at least one stone within the gallbladder neck. Laboratory data would be complimentary. Acute duodenitis or pancreatitis are less likely differential considerations. No adenopathy or drainable fluid collection.    Pelvis: Unremarkable bladder and the uterus is surgically absent. No drainable fluid collection in the pelvis. Diverticulosis. Diminutive but normal appendix. Negative inguinal canals. No suspicious bone lesion.      Impression:         1. Cholelithiasis with CT findings suspicious for early acute cholecystitis. At least one stone in the region of the gallbladder neck. Differential technically includes the possibility of acute duodenitis or pancreatitis both of which are felt to be less  likely. No intra or extrahepatic biliary ductal dilatation.  2. Hepatic steatosis.  3. Surgical absence of the uterus.          Signer Name: Pete Mahan MD   Signed: 11/30/2019 9:05 PM   Workstation Name: [facility]-    [facility]    XR Chest 1 View [[id number]] Collected:  11/30/19 2058     Updated:  11/30/19  2100    Narrative:       CR Chest 1 Vw    INDICATION:   Midsternal chest pain that began this morning. Nausea and vomiting since 0400 hours. History of mastectomy bilaterally for breast cancer.     COMPARISON:    4/9/2019    FINDINGS:  Single portable AP view(s) of the chest.  Cardiac silhouette is borderline enlarged and stable. Unremarkable vascularity. There is some probable atelectasis in the lower lung zone on the left. No dense consolidation or effusion. No pneumothorax.      Impression:         1. Borderline cardiac size, otherwise negative chest.    Signer Name: Pete Mahan MD   Signed: 11/30/2019 8:58 PM   Workstation Name: [email or web address]    [facility]            Assessment:  Acute cholecystitis  Morbid obesity  Hypertension  Diabetes mellitus    Plan:  N.p.o., IV fluid resuscitation, IV antibiotics (Zosyn), and cholecystectomy.  Discussed the risks and benefits of cholecystectomy in the face of her symptoms including, but not limited to: Bleeding, infection, injury to adjacent viscera (common bile duct, duodenum etc.), retained stones, bile leak, need for re-intervention, need for ERCP, an open operation in general, and medical issues from a cardiopulmonary and deep venous thrombosis standpoint.  She and her family understand these risks and wished to proceed with surgical intervention.  Give her moving towards the operating room now.    Rafiq Angelo MD  12/01/19  9:50 AM

## 2019-12-01 NOTE — PLAN OF CARE
Problem: Cholecystectomy (Adult)  Intervention: Prevent/Manage Impaired Postoperative Elimination   12/01/19 1641   Promote Effective Bowel Elimination   Bowel Intervention adequate fluid intake promoted;ambulation promoted;diet adjusted;chewing gum;privacy promoted   Genitourinary () Interventions   Urinary Elimination Promotion frequent voiding encouraged;toileting scheduled;toileting device within reach;voiding relaxation promoted

## 2019-12-01 NOTE — PLAN OF CARE
Problem: Patient Care Overview  Goal: Plan of Care Review  Outcome: Ongoing (interventions implemented as appropriate)   12/01/19 1887   Coping/Psychosocial   Plan of Care Reviewed With patient   Plan of Care Review   Progress improving   OTHER   Outcome Summary Post helena miranda doing well meeting ERAS goals

## 2019-12-01 NOTE — PLAN OF CARE
Problem: Pain, Acute (Adult)  Goal: Identify Related Risk Factors and Signs and Symptoms  Outcome: Ongoing (interventions implemented as appropriate)   12/01/19 0668   Pain, Acute (Adult)   Related Risk Factors (Acute Pain) patient perception;positioning;disease process   Signs and Symptoms (Acute Pain) facial mask of pain/grimace;guarding/abnormal posturing/positioning;nausea/vomiting/anorexia;verbalization of pain descriptors

## 2019-12-02 LAB
GLUCOSE BLDC GLUCOMTR-MCNC: 123 MG/DL (ref 70–130)
GLUCOSE BLDC GLUCOMTR-MCNC: 130 MG/DL (ref 70–130)
GLUCOSE BLDC GLUCOMTR-MCNC: 156 MG/DL (ref 70–130)
GLUCOSE BLDC GLUCOMTR-MCNC: 165 MG/DL (ref 70–130)

## 2019-12-02 PROCEDURE — 94799 UNLISTED PULMONARY SVC/PX: CPT

## 2019-12-02 PROCEDURE — 99232 SBSQ HOSP IP/OBS MODERATE 35: CPT | Performed by: INTERNAL MEDICINE

## 2019-12-02 PROCEDURE — 82962 GLUCOSE BLOOD TEST: CPT

## 2019-12-02 PROCEDURE — G0108 DIAB MANAGE TRN  PER INDIV: HCPCS

## 2019-12-02 PROCEDURE — 25010000002 PIPERACILLIN SOD-TAZOBACTAM PER 1 G: Performed by: INTERNAL MEDICINE

## 2019-12-02 RX ADMIN — INSULIN HUMAN 2 UNITS: 100 INJECTION, SOLUTION PARENTERAL at 11:35

## 2019-12-02 RX ADMIN — MELOXICAM 7.5 MG: 7.5 TABLET ORAL at 09:23

## 2019-12-02 RX ADMIN — HYDROCODONE BITARTRATE AND ACETAMINOPHEN 1 TABLET: 5; 325 TABLET ORAL at 15:20

## 2019-12-02 RX ADMIN — TAZOBACTAM SODIUM AND PIPERACILLIN SODIUM 3.38 G: 375; 3 INJECTION, SOLUTION INTRAVENOUS at 07:32

## 2019-12-02 RX ADMIN — FAMOTIDINE 20 MG: 20 TABLET ORAL at 18:34

## 2019-12-02 RX ADMIN — DOCUSATE SODIUM 100 MG: 100 CAPSULE, LIQUID FILLED ORAL at 09:22

## 2019-12-02 RX ADMIN — DOCUSATE SODIUM 100 MG: 100 CAPSULE, LIQUID FILLED ORAL at 21:31

## 2019-12-02 RX ADMIN — HYDROCODONE BITARTRATE AND ACETAMINOPHEN 1 TABLET: 5; 325 TABLET ORAL at 09:23

## 2019-12-02 RX ADMIN — HYDROCODONE BITARTRATE AND ACETAMINOPHEN 1 TABLET: 5; 325 TABLET ORAL at 21:31

## 2019-12-02 RX ADMIN — TAZOBACTAM SODIUM AND PIPERACILLIN SODIUM 3.38 G: 375; 3 INJECTION, SOLUTION INTRAVENOUS at 22:05

## 2019-12-02 RX ADMIN — FAMOTIDINE 20 MG: 20 TABLET ORAL at 06:48

## 2019-12-02 RX ADMIN — SODIUM CHLORIDE, PRESERVATIVE FREE 10 ML: 5 INJECTION INTRAVENOUS at 21:31

## 2019-12-02 RX ADMIN — TAZOBACTAM SODIUM AND PIPERACILLIN SODIUM 3.38 G: 375; 3 INJECTION, SOLUTION INTRAVENOUS at 14:29

## 2019-12-02 NOTE — PLAN OF CARE
Problem: Patient Care Overview  Goal: Plan of Care Review  Outcome: Ongoing (interventions implemented as appropriate)   12/02/19 0415   Coping/Psychosocial   Plan of Care Reviewed With patient   Plan of Care Review   Progress improving   OTHER   Outcome Summary Patient has had a decent shift, sleeping on and off tonight. VSS, and patient has been alert and oriented times four. No complaints of pain tonight. Nursing staff will continue to monitor and assess the patient.     Goal: Individualization and Mutuality  Outcome: Ongoing (interventions implemented as appropriate)   12/02/19 0415   Individualization   Patient Specific Interventions Monitor and assess for pain q2hrs and prn       Problem: Cholecystectomy (Adult)  Goal: Signs and Symptoms of Listed Potential Problems Will be Absent, Minimized or Managed (Cholecystectomy)  Outcome: Ongoing (interventions implemented as appropriate)   12/02/19 0415   Goal/Outcome Evaluation   Problems Assessed (Cholecystectomy) all   Problems Present (Cholecystectomy) pain

## 2019-12-02 NOTE — PROGRESS NOTES
"General Surgery Post op Follow Up Note    Subjective:   Feels better.  No flatus.    /72 (BP Location: Right arm, Patient Position: Lying)   Pulse 77   Temp 98.5 °F (36.9 °C) (Oral)   Resp 16   Ht 165.1 cm (65\")   Wt 112 kg (247 lb)   SpO2 97%   BMI 41.10 kg/m²     General Appearance:  in no acute distress  Abdomen: incision is clean and dry, x4.    CBC  Results from last 7 days   Lab Units 12/01/19  0512   WBC 10*3/mm3 8.12   HEMOGLOBIN g/dL 11.3*   HEMATOCRIT % 35.4   PLATELETS 10*3/mm3 331       CMP/BMP  Results from last 7 days   Lab Units 12/01/19  0512   SODIUM mmol/L 140   POTASSIUM mmol/L 3.7   CHLORIDE mmol/L 101   CO2 mmol/L 27.0   BUN mg/dL 11   CREATININE mg/dL 0.97   CALCIUM mg/dL 8.5*   BILIRUBIN mg/dL 0.5   ALK PHOS U/L 99   ALT (SGPT) U/L 36*   AST (SGOT) U/L 38*   GLUCOSE mg/dL 123*         ASSESSMENT/PLAN:    Advance diet to a low-fat diet.  Mobilize.  Repeat labs in the morning.    Rafiq Angelo MD  12/2/2019  11:54 AM  "

## 2019-12-02 NOTE — PLAN OF CARE
Problem: Patient Care Overview  Goal: Plan of Care Review  Outcome: Ongoing (interventions implemented as appropriate)   12/02/19 1540   Coping/Psychosocial   Plan of Care Reviewed With patient   Plan of Care Review   Progress improving   OTHER   Outcome Summary Pt is not passing flatus yet. she is walking and using IS. Possible D?C tomorrow.       Problem: Cholecystectomy (Adult)  Intervention: Monitor and Manage Acute Postoperative Pain   12/02/19 1540   Promote Oxygenation/Perfusion   Pain Management Interventions care clustered;breathing exercises;pain management plan reviewed with patient/caregiver;pillow support provided;premedicated for activity;see MAR;quiet environment facilitated

## 2019-12-02 NOTE — CONSULTS
"Diabetes Education  Assessment/Teaching    Patient Name:  Wallace Dukes  YOB: 1955  MRN: [id number]  Admit Date:  11/30/2019      Assessment Date:  12/2/2019    Most Recent Value   General Information    Referral From:  A1c   Height  165.1 cm (65\")   Height Method  Stated   Weight  112 kg (247 lb)   Weight Method  Stated   Pregnancy Assessment   Diabetes History   What type of diabetes do you have?  Type 2   Length of Diabetes Diagnosis  1 - 5 years   Current DM knowledge  fair   Have you had diabetes education/teaching in the past?  yes [per PCP office and son is type 1 diabetic]   When and where was your diabetes education?  per PCP office and sone is trype one diabetic    Do you test your blood sugar at home?  no   Have you had low blood sugar? (<70mg/dl)  -- [unknown]   Have you had high blood sugar? (>140mg/dl)  -- [unknown]   How would you rate your diabetes control?  fair   Education Preferences   What areas of diabetes would you like to learn about?  avoiding high blood sugar, avoiding low blood sugar, diabetes complications, diet information, medications for diabetes   Nutrition Information   Assessment Topics   Healthy Eating - Assessment  Needs education   Being Active - Assessment  Needs education   Taking Medication - Assessment  Needs education   Problem Solving - Assessment  Needs education   Reducing Risk - Assessment  Needs education   Healthy Coping - Assessment  Needs education   Monitoring - Assessment  Needs education   DM Goals            Most Recent Value   DM Education Needs   Meter  Needs meter [education provided on how to obtain meter either through insurance or to buy them in store]   Frequency of Testing  -- [does not check fingersticks at home ]   Blood Glucose Target Range  educated on blood glucose range per ADA recomendations    Medication  Oral   Problem Solving  Hypoglycemia, Hyperglycemia, Sick days, Signs, Symptoms, Treatment   Reducing Risks  A1C testing " "  Physical Activity  Walking   Physical Activity Frequency  Rarely   Healthy Coping  Appropriate   Motivation  Moderate   Teaching Method  Explanation, Handouts   Patient Response  Needs reinforcement, Verbalized understanding            Other Comments:  Patient consented to diabetes education      Discussed and taught patient about type 2 diabetes self-management, risk factors, and importance of blood glucose control to reduce complications. Target blood glucose readings and A1c goals per ADA were reviewed. Reviewed with patient current A1c 8.2 and discussed its significance. Signs, symptoms, and treatment of hyperglycemia and hypoglycemia were discussed. Lifestyle changes such as physical activity with MD approval and healthy eating were encouraged. Stressed the importance of strict blood sugar control after surgery to prevent complications such as infection and to promote healing of incision. Encouraged pt to monitor blood sugar at home 4  times per day and to call PCP if blood sugar is trending [place]. Encouraged to keep record of blood glucose readings to take to follow up appointment with PCP. Provided patient with copy of [employer]'s \"What is Diabetes\" handout, \"Blood Glucose Goals\" handout, and \"What is A1c\" handout. Patient also education on healthy eating habits, and stress reduction. Thank you for this consult, should patient needs change please re consult us.        Electronically signed by:  Harpreet Gunderson RN  12/02/19 11:49 AM  "

## 2019-12-02 NOTE — H&P
[facility]  Clinical Decision Unit  HISTORY & PHYSICAL    Patient Name: Wallace Dukes  : 1955  MRN: [id number]  Primary Care Physician: Domingo Sood MD  Date of admission: 2019  7:49 PM      Subjective   Subjective     Chief Complaint:      HPI:  Wallace Dukes is a 64 y.o. female     Review of Systems   All other systems reviewed and negative    Personal History     Past Medical History:   Diagnosis Date   • Cancer of breast (CMS/HCC)    • Chest pain 2009    stress nuclear   • MRSA (methicillin resistant Staphylococcus aureus)     infection of right breast expander; bilateral removal of expanders   • Schwannoma 2016    left translabyrinthine craniotomy       Past Surgical History:   Procedure Laterality Date   • BREAST RECONSTRUCTION, BREAST TISSUE EXPANDER INSERTION Bilateral 2013   • BREAST TISSUE EXPANDER REMOVAL INSERTION OF IMPLANT Bilateral     bilateral removal of expanders   • CRANIOTOMY FOR TUMOR  2016    Left Translabyrithine craniotomy   • HYSTERECTOMY     • MASTECTOMY Bilateral     complicated by MRSA wound infection on right requiring removal of expander.       Family History: family history includes Diabetes in her mother, sister, and son; Hypertension in her brother and mother; Migraines in her mother. Otherwise pertinent FHx was reviewed and unremarkable.     Social History:  reports that she has never smoked. She has never used smokeless tobacco. She reports that she does not drink alcohol or use drugs.  Social History     Social History Narrative   • Not on file       Medications:  Available home medication information reviewed.  Medications Prior to Admission   Medication Sig Dispense Refill Last Dose   • atorvastatin (LIPITOR) 40 MG tablet TAKE 1 TABLET BY MOUTH EVERY DAY 90 tablet 1 2019 at Unknown time   • cholecalciferol (VITAMIN D3) 1000 units tablet Take 3 tablets by mouth Every Morning.    11/30/2019 at Unknown time   • escitalopram (LEXAPRO) 10 MG tablet TAKE 1 TABLET BY MOUTH EVERY DAY 90 tablet 1 11/30/2019 at Unknown time   • loratadine (CLARITIN) 10 MG tablet Take 1 tablet by mouth Daily.   11/30/2019 at Unknown time   • metFORMIN (GLUCOPHAGE) 1000 MG tablet Take 1 tablet by mouth 2 (Two) Times a Day With Meals. 60 tablet 5 11/30/2019 at Unknown time   • Multiple Vitamins-Minerals (MULTIVITAMIN ADULT PO) Take 1 tablet by mouth Daily.   11/30/2019 at Unknown time   • pantoprazole (PROTONIX) 40 MG EC tablet TAKE 1 TABLET BY MOUTH TWICE A  tablet 1 11/30/2019 at Unknown time   • ALBUTEROL SULFATE  (90 Base) MCG/ACT inhaler INHALE 2 PUFFS EVERY 4 (FOUR) HOURS AS NEEDED FOR WHEEZING OR SHORTNESS OF AIR. 18 inhaler 3 Taking   • dextromethorphan-guaifenesin (MUCINEX DM MAX STRENGTH)  MG per 12 hr tablet Take  by mouth As Needed.   More than a month at Unknown time   • docusate sodium (STOOL SOFTENER) 100 MG capsule Take 1 capsule by mouth Daily As Needed.   Taking   • lisinopril (PRINIVIL,ZESTRIL) 10 MG tablet TAKE 1 TABLET BY MOUTH EVERY DAY 90 tablet 1 Taking       No Known Allergies    Objective   Objective     Vital Signs:   Temp:  [97.5 °F (36.4 °C)-98.6 °F (37 °C)] 98.5 °F (36.9 °C)  Heart Rate:  [65-86] 77  Resp:  [14-20] 16  BP: ()/(64-83) 125/72        Physical Exam       Results Reviewed:      Assessment/Plan   Assessment / Plan     Active Hospital Problems    Diagnosis POA   • **Acute calculous cholecystitis [K80.00] Yes   • Major depressive disorder with single episode, in partial remission (CMS/HCC) [F32.4] Yes   • Venous (peripheral) insufficiency [I87.2] Yes   • Morbid obesity (CMS/HCC) [E66.01] Yes   • Mixed hyperlipidemia [E78.2] Yes   • GERD (gastroesophageal reflux disease) [K21.9] Yes   • Diabetic neuropathy associated with type 2 diabetes mellitus (CMS/HCC) [E11.40] Yes   • Benign essential hypertension [I10] Yes           Admission Status:   I believe  this patient meets     Criteria for Discharge:    **    Electronically signed by Pilar Meek MD, 12/02/19, 9:21 AM.

## 2019-12-02 NOTE — PROGRESS NOTES
Discharge Planning Assessment  [facility]     Patient Name: Wallace Dukes  MRN: [id number]  Today's Date: 12/2/2019    Admit Date: 11/30/2019    Discharge Needs Assessment     Row Name 12/02/19 1457       Living Environment    Lives With  alone    Current Living Arrangements  home/apartment/condo    Primary Care Provided by  self    Provides Primary Care For  no one    Family Caregiver if Needed  child(alirio), adult    Family Caregiver Names  Son, Xavi Dukes    Quality of Family Relationships  supportive;involved;helpful    Able to Return to Prior Arrangements  yes    Living Arrangement Comments  Lives alone in house with 2 steps at entrance.       Resource/Environmental Concerns    Resource/Environmental Concerns  none    Transportation Concerns  car, none       Transition Planning    Patient/Family Anticipates Transition to  home    Patient/Family Anticipated Services at Transition  none    Transportation Anticipated  family or friend will provide       Discharge Needs Assessment    Readmission Within the Last 30 Days  no previous admission in last 30 days    Concerns to be Addressed  no discharge needs identified    Equipment Currently Used at Home  glucometer    Anticipated Changes Related to Illness  none    Equipment Needed After Discharge  none        Discharge Plan     Row Name 12/02/19 1093       Plan    Plan  Plan is home    Patient/Family in Agreement with Plan  yes    Plan Comments  Met with patient in the room for initial discharge planning.  Lives alone in [place] in a house with 2 steps at entrance.  States she manages these steps without difficulty.  Independent with ADL.  No home health involved.  She has a glucometer.  PCP is Domingo Sood.  Plan is home at discharge.  Following for D/C needs.    Final Discharge Disposition Code  01 - home or self-care        Destination      No service coordination in this encounter.      Durable Medical Equipment      No service coordination  in this encounter.      Dialysis/Infusion      No service coordination in this encounter.      Home Medical Care      No service coordination in this encounter.      Therapy      No service coordination in this encounter.      Community Resources      No service coordination in this encounter.          Demographic Summary     Row Name 12/02/19 1456       General Information    Admission Type  inpatient    Arrived From  emergency department    Referral Source  admission list    Reason for Consult  discharge planning    Preferred Language  English        Functional Status     Row Name 12/02/19 1456       Functional Status    Usual Activity Tolerance  excellent    Current Activity Tolerance  excellent       Functional Status, IADL    Medications  independent    Meal Preparation  independent    Housekeeping  independent    Laundry  independent    Shopping  independent        Psychosocial    No documentation.       Abuse/Neglect    No documentation.       Legal    No documentation.       Substance Abuse    No documentation.       Patient Forms    No documentation.           Patsy Prescott RN

## 2019-12-03 VITALS
WEIGHT: 247 LBS | OXYGEN SATURATION: 92 % | HEIGHT: 65 IN | SYSTOLIC BLOOD PRESSURE: 147 MMHG | DIASTOLIC BLOOD PRESSURE: 97 MMHG | RESPIRATION RATE: 16 BRPM | BODY MASS INDEX: 41.15 KG/M2 | TEMPERATURE: 98.9 F | HEART RATE: 95 BPM

## 2019-12-03 PROBLEM — E11.42 TYPE 2 DIABETES MELLITUS WITH DIABETIC POLYNEUROPATHY, WITHOUT LONG-TERM CURRENT USE OF INSULIN: Status: ACTIVE | Noted: 2019-04-08

## 2019-12-03 LAB
ALBUMIN SERPL-MCNC: 3.1 G/DL (ref 3.5–5.2)
ALBUMIN/GLOB SERPL: 1 G/DL
ALP SERPL-CCNC: 94 U/L (ref 39–117)
ALT SERPL W P-5'-P-CCNC: 66 U/L (ref 1–33)
ANION GAP SERPL CALCULATED.3IONS-SCNC: 12 MMOL/L (ref 5–15)
AST SERPL-CCNC: 45 U/L (ref 1–32)
BACTERIA FLD CULT: ABNORMAL
BILIRUB SERPL-MCNC: 0.5 MG/DL (ref 0.2–1.2)
BUN BLD-MCNC: 12 MG/DL (ref 8–23)
BUN/CREAT SERPL: 11.9 (ref 7–25)
CALCIUM SPEC-SCNC: 8.5 MG/DL (ref 8.6–10.5)
CHLORIDE SERPL-SCNC: 99 MMOL/L (ref 98–107)
CO2 SERPL-SCNC: 27 MMOL/L (ref 22–29)
CREAT BLD-MCNC: 1.01 MG/DL (ref 0.57–1)
CYTO UR: NORMAL
DEPRECATED RDW RBC AUTO: 47.2 FL (ref 37–54)
ERYTHROCYTE [DISTWIDTH] IN BLOOD BY AUTOMATED COUNT: 13.3 % (ref 12.3–15.4)
GFR SERPL CREATININE-BSD FRML MDRD: 55 ML/MIN/1.73
GLOBULIN UR ELPH-MCNC: 3 GM/DL
GLUCOSE BLD-MCNC: 136 MG/DL (ref 65–99)
GLUCOSE BLDC GLUCOMTR-MCNC: 143 MG/DL (ref 70–130)
GRAM STN SPEC: ABNORMAL
GRAM STN SPEC: ABNORMAL
HCT VFR BLD AUTO: 33.9 % (ref 34–46.6)
HGB BLD-MCNC: 10.8 G/DL (ref 12–15.9)
LAB AP CASE REPORT: NORMAL
LAB AP CLINICAL INFORMATION: NORMAL
MCH RBC QN AUTO: 30.8 PG (ref 26.6–33)
MCHC RBC AUTO-ENTMCNC: 31.9 G/DL (ref 31.5–35.7)
MCV RBC AUTO: 96.6 FL (ref 79–97)
PATH REPORT.FINAL DX SPEC: NORMAL
PATH REPORT.GROSS SPEC: NORMAL
PLATELET # BLD AUTO: 329 10*3/MM3 (ref 140–450)
PMV BLD AUTO: 9.4 FL (ref 6–12)
POTASSIUM BLD-SCNC: 4.1 MMOL/L (ref 3.5–5.2)
PROT SERPL-MCNC: 6.1 G/DL (ref 6–8.5)
RBC # BLD AUTO: 3.51 10*6/MM3 (ref 3.77–5.28)
SODIUM BLD-SCNC: 138 MMOL/L (ref 136–145)
WBC NRBC COR # BLD: 9.24 10*3/MM3 (ref 3.4–10.8)

## 2019-12-03 PROCEDURE — 63710000001 INSULIN LISPRO (HUMAN) PER 5 UNITS

## 2019-12-03 PROCEDURE — 85027 COMPLETE CBC AUTOMATED: CPT | Performed by: SURGERY

## 2019-12-03 PROCEDURE — 80053 COMPREHEN METABOLIC PANEL: CPT | Performed by: SURGERY

## 2019-12-03 PROCEDURE — 25010000002 PIPERACILLIN SOD-TAZOBACTAM PER 1 G: Performed by: INTERNAL MEDICINE

## 2019-12-03 PROCEDURE — 82962 GLUCOSE BLOOD TEST: CPT

## 2019-12-03 PROCEDURE — 99239 HOSP IP/OBS DSCHRG MGMT >30: CPT | Performed by: PHYSICIAN ASSISTANT

## 2019-12-03 RX ORDER — ACETAMINOPHEN 325 MG/1
650 TABLET ORAL EVERY 6 HOURS PRN
Start: 2019-12-03

## 2019-12-03 RX ORDER — DOCUSATE SODIUM 100 MG/1
100 CAPSULE, LIQUID FILLED ORAL 2 TIMES DAILY
Start: 2019-12-03 | End: 2020-05-12

## 2019-12-03 RX ORDER — HYDROCODONE BITARTRATE AND ACETAMINOPHEN 5; 325 MG/1; MG/1
1 TABLET ORAL EVERY 6 HOURS PRN
Qty: 10 TABLET | Refills: 0 | Status: SHIPPED | OUTPATIENT
Start: 2019-12-03 | End: 2020-01-27

## 2019-12-03 RX ORDER — SACCHAROMYCES BOULARDII 250 MG
250 CAPSULE ORAL 2 TIMES DAILY
Qty: 20 CAPSULE | Refills: 0 | Status: SHIPPED | OUTPATIENT
Start: 2019-12-03 | End: 2019-12-13

## 2019-12-03 RX ORDER — SACCHAROMYCES BOULARDII 250 MG
250 CAPSULE ORAL 2 TIMES DAILY
Qty: 10 CAPSULE | Refills: 0 | Status: SHIPPED | OUTPATIENT
Start: 2019-12-03 | End: 2019-12-03 | Stop reason: SDUPTHER

## 2019-12-03 RX ORDER — AMOXICILLIN AND CLAVULANATE POTASSIUM 875; 125 MG/1; MG/1
1 TABLET, FILM COATED ORAL 2 TIMES DAILY
Qty: 10 TABLET | Refills: 0 | Status: SHIPPED | OUTPATIENT
Start: 2019-12-03 | End: 2019-12-08

## 2019-12-03 RX ORDER — HYDROCODONE BITARTRATE AND ACETAMINOPHEN 5; 325 MG/1; MG/1
1 TABLET ORAL EVERY 6 HOURS PRN
Qty: 10 TABLET | Refills: 0 | Status: SHIPPED | OUTPATIENT
Start: 2019-12-03 | End: 2019-12-03

## 2019-12-03 RX ADMIN — SODIUM CHLORIDE, PRESERVATIVE FREE 10 ML: 5 INJECTION INTRAVENOUS at 08:58

## 2019-12-03 RX ADMIN — DOCUSATE SODIUM 100 MG: 100 CAPSULE, LIQUID FILLED ORAL at 08:57

## 2019-12-03 RX ADMIN — MELOXICAM 7.5 MG: 7.5 TABLET ORAL at 08:57

## 2019-12-03 RX ADMIN — FAMOTIDINE 20 MG: 20 TABLET ORAL at 06:51

## 2019-12-03 RX ADMIN — TAZOBACTAM SODIUM AND PIPERACILLIN SODIUM 3.38 G: 375; 3 INJECTION, SOLUTION INTRAVENOUS at 06:51

## 2019-12-03 NOTE — DISCHARGE SUMMARY
[facility]  DISCHARGE SUMMARY    Patient Name: Wallace Dukes  : 1955  MRN: [id number]    Date of Admission: 2019  7:49 PM  Date of Discharge: 12/3/2019  Primary Care Physician: Domingo Sood MD    Consults     Date and Time Order Name Status Description    2019 0030 Inpatient General Surgery Consult Completed         Hospital Course     Presenting Problem:   Acute calculous cholecystitis [K80.00]    Active Hospital Problems    Diagnosis  POA   • **Acute calculous cholecystitis [K80.00]  Yes   • Major depressive disorder with single episode, in partial remission (CMS/[facility]) [F32.4]  Yes   • Venous (peripheral) insufficiency [I87.2]  Yes   • Morbid obesity (CMS/[facility]) [E66.01]  Yes   • Mixed hyperlipidemia [E78.2]  Yes   • GERD (gastroesophageal reflux disease) [K21.9]  Yes   • Diabetic neuropathy associated with type 2 diabetes mellitus (CMS/[facility]) [E11.40]  Yes   • Benign essential hypertension [I10]  Yes   • Type 2 diabetes mellitus with diabetic polyneuropathy, without long-term current use of insulin (CMS/[facility]) [E11.42]  Yes      Resolved Hospital Problems   No resolved problems to display.      Hospital Course:  Ms. Wallace Dukes is a 64yoF with PMH significant for breast cancer (s/p mastectomy), HTN, HLD, non-insulin dependent DMII with peripheral neuropathy, GERD, PVD and obesity. She presented to [facility] ED on 2019 with complaints of abdominal pain, nausea and vomiting. She was found to have acute cholecystitis and was admitted to the hospital medicine service.     She was started on IV Zosyn. Dr. Angelo of general surgery followed the patient. He performed laparoscopic CCY on 2019. Purulent bile was encountered intraoperatively. Culture returned with 3+ mixed gram-negative deyvi. We will transition Zosyn to PO Augmentin at discharge to complete a total of 7 days of therapy.     Ms. Dukes has improved.  Her pain is managed with PO medications, she is tolerating PO intake, passing flatus and bowels have moved. She remains afebrile. She will discharge home in stable condition on 12/3/2019.     Follow up PCP in 1 week  Follow up with Dr. Angelo in 2 weeks    Discharge Follow Up Recommendations for labs/diagnostics:  - Hgb A1c 8.2% - PCP recently increased Metformin. No new changes    - BP elevated intermittent. Per patient, home Lisinopril 10mg discontinued by PCP last month. Encouraged her to monitor BP at home and take log to appointment to discuss     - Please repeat CMP at follow up appointment     Day of Discharge     HPI:   Sitting up in bed, son at bedside. Pain 3/10. No fevers, chills, nausea or vomiting. Passing gas but no BM yet. Says she would prefer to go home and try to move bowels at home. Tolerating PO intake.     Review of Systems  Gen- No fevers, chills  CV- No chest pain, palpitations  Resp- No cough, dyspnea  GI- as above    Otherwise ROS is negative except as mentioned in the HPI.    Vital Signs:   Temp:  [97.4 °F (36.3 °C)-99.3 °F (37.4 °C)] 98.9 °F (37.2 °C)  Heart Rate:  [70-95] 95  Resp:  [16-18] 16  BP: (119-150)/(68-97) 147/97     Physical Exam:  Constitutional: No acute distress, awake, alert and conversant. Obese body habitus.   HENT: NCAT, mucous membranes moist  Respiratory: Clear to auscultation bilaterally, respiratory effort normal on room air.   Cardiovascular: RRR, no murmurs, rubs, or gallops, palpable pedal pulses bilaterally  Gastrointestinal: Positive bowel sounds, soft, appropriately tender. Laparoscopic incision x 4 c/d/i with no erythema, edema or induration. Steri-strips intact.   Musculoskeletal: No bilateral ankle edema  Psychiatric: Appropriate affect, cooperative  Neurologic: Oriented x 3, strength symmetric in all extremities, Cranial Nerves grossly intact to confrontation, speech clear  Skin: No rashes    Pertinent  and/or Most Recent Results     Results from last 7  days   Lab Units 12/03/19 0511 12/01/19 0512 11/30/19 2005   WBC 10*3/mm3 9.24 8.12 14.45*   HEMOGLOBIN g/dL 10.8* 11.3* 13.1   HEMATOCRIT % 33.9* 35.4 39.7   PLATELETS 10*3/mm3 329 331 412   SODIUM mmol/L 138 140 138   POTASSIUM mmol/L 4.1 3.7 3.6   CHLORIDE mmol/L 99 101 98   CO2 mmol/L 27.0 27.0 26.0   BUN mg/dL 12 11 12   CREATININE mg/dL 1.01* 0.97 0.93   GLUCOSE mg/dL 136* 123* 128*   CALCIUM mg/dL 8.5* 8.5* 9.8     Results from last 7 days   Lab Units 12/03/19 0511 12/01/19 0512 11/30/19 2005   BILIRUBIN mg/dL 0.5 0.5 0.6   ALK PHOS U/L 94 99 123*   ALT (SGPT) U/L 66* 36* 36*   AST (SGOT) U/L 45* 38* 26     Results from last 7 days   Lab Units 12/01/19 0512 11/30/19 2047 11/30/19 2005   HEMOGLOBIN A1C % 8.20*  --   --    PROBNP pg/mL  --   --  100.9   TROPONIN T ng/mL  --   --  <0.010   PROCALCITONIN ng/mL 0.06*  --  0.08*   LACTATE mmol/L  --  1.7  --      Brief Urine Lab Results  (Last result in the past 365 days)      Color   Clarity   Blood   Leuk Est   Nitrite   Protein   CREAT   Urine HCG        11/30/19 2123 Yellow Clear Negative Negative Negative Negative             Microbiology Results Abnormal     Procedure Component Value - Date/Time    Body Fluid Culture - Body Fluid, Gallbladder [[id number]]  (Abnormal) Collected:  12/01/19 1139    Lab Status:  Final result Specimen:  Body Fluid from Gallbladder Updated:  12/03/19 0648     Body Fluid Culture Moderate growth (3+) Mixed Gram Negative Bhavana     Gram Stain Many (4+) WBCs seen      Rare (1+) Gram negative bacilli    Blood Culture - Blood, Arm, Left [[id number]] Collected:  11/30/19 2005    Lab Status:  Preliminary result Specimen:  Blood from Arm, Left Updated:  12/02/19 2148     Blood Culture No growth at 2 days    Blood Culture - Blood, Hand, Left [[id number]] Collected:  11/30/19 2045    Lab Status:  Preliminary result Specimen:  Blood from Hand, Left Updated:  12/02/19 2148     Blood Culture No growth at 2 days        Imaging Results (All)      Procedure Component Value Units Date/Time    MRI abdomen wo contrast mrcp [[id number]] Collected:  12/01/19 0752     Updated:  12/01/19 0754    Narrative:       INDICATION:    Cholelithiasis. Acute cholecystitis. Fever and epigastric pain. Morbid obesity. Abdominal pain nausea and vomiting onset of symptoms around 4:00 AM on 11/30/2019.    TECHNIQUE:   MRI abdomen/MRCP performed without contrast.    COMPARISON:    Abdomen pelvis CT 11/30/2019.    FINDINGS:  There are multiple gallstones within the gallbladder and the gallbladder is distended with apparent gallbladder wall thickening/edema. Findings are consistent with clinically suspected acute cholecystitis. There is also edema in the fat adjacent to the  gallbladder neck and second portion of the duodenum there is discontinuity in visualization of the cystic duct to gallbladder neck probably corresponding to the site of obstructing stone on the earlier CT scan in the gallbladder neck. There is some  apparent wall thickening of the proximal duodenum which raises concern for duodenitis versus some reactive edema related to the adjacent gallbladder inflammation. There is no intrahepatic or extrahepatic biliary ductal dilatation. No filling defect is  appreciated within the common bile.      Impression:         1. The gallbladder is distended and there are innumerable stones within it. There is gallbladder wall thickening/edema as well as edema in the adjacent proximal pericholecystic fat and edema in the fat surrounding the second portion of the duodenum.  Findings are consistent with acute cholecystitis and possibly milder component of duodenitis.  2. There is no intrahepatic or extrahepatic biliary ductal dilatation. No filling defect is seen within the common bile duct.  3. There is discontinuity of signal between the neck of the gallbladder and the cystic duct likely corresponding to the site of obstructing stone within the gallbladder neck seen on the earlier  CT scan.     Signer Name: Pam Nguyễn MD   Signed: 12/1/2019 7:52 AM   Workstation Name: [email or web address]    [facility]      CT Abdomen Pelvis Without Contrast [[id number]] Collected:  11/30/19 2105     Updated:  11/30/19 2108    Narrative:       CT Abdomen Pelvis WO    INDICATION:   Epigastric pain nausea and vomiting today. History of breast cancer status post hysterectomy.    TECHNIQUE:   CT of the abdomen and pelvis without IV contrast. Coronal and sagittal reconstructions were obtained.  Radiation dose reduction techniques included automated exposure control or exposure modulation based on body size. Count of known CT and cardiac nuc  med studies performed in previous 12 months: 0.     COMPARISON:   None available.    FINDINGS:  Abdomen: There is old healed granulomatous disease. Minimal atelectasis in the lower lobes. Normal caliber aorta. The spleen and adrenal glands are unremarkable. The kidneys are nonobstructed. There is at least mild hepatic steatosis.    There is extensive cholelithiasis and the gallbladder is distended up to 4.8 cm. There is some subtle stranding in the region of the gallbladder neck extending into the pancreaticoduodenal groove and imaging findings are suspicious for early changes of  acute cholecystitis. There is at least one stone within the gallbladder neck. Laboratory data would be complimentary. Acute duodenitis or pancreatitis are less likely differential considerations. No adenopathy or drainable fluid collection.    Pelvis: Unremarkable bladder and the uterus is surgically absent. No drainable fluid collection in the pelvis. Diverticulosis. Diminutive but normal appendix. Negative inguinal canals. No suspicious bone lesion.      Impression:         1. Cholelithiasis with CT findings suspicious for early acute cholecystitis. At least one stone in the region of the gallbladder neck. Differential technically includes the possibility of acute duodenitis or  pancreatitis both of which are felt to be less  likely. No intra or extrahepatic biliary ductal dilatation.  2. Hepatic steatosis.  3. Surgical absence of the uterus.    Signer Name: Pete Mahan MD   Signed: 11/30/2019 9:05 PM   Workstation Name: [email or web address]    [facility]      XR Chest 1 View [[id number]] Collected:  11/30/19 2058     Updated:  11/30/19 2100    Narrative:       CR Chest 1 Vw    INDICATION:   Midsternal chest pain that began this morning. Nausea and vomiting since 0400 hours. History of mastectomy bilaterally for breast cancer.     COMPARISON:    4/9/2019    FINDINGS:  Single portable AP view(s) of the chest.  Cardiac silhouette is borderline enlarged and stable. Unremarkable vascularity. There is some probable atelectasis in the lower lung zone on the left. No dense consolidation or effusion. No pneumothorax.      Impression:         1. Borderline cardiac size, otherwise negative chest.    Signer Name: Pete Mahan MD   Signed: 11/30/2019 8:58 PM   Workstation Name: [facility]    [facility]         Order Current Status    Anaerobic Culture - Body Fluid, Gallbladder In process    Tissue Pathology Exam In process    Blood Culture - Blood, Arm, Left Preliminary result    Blood Culture - Blood, Hand, Left Preliminary result        Discharge Details        Discharge Medications      New Medications      Instructions Start Date   acetaminophen 325 MG tablet  Commonly known as:  TYLENOL   650 mg, Oral, Every 6 Hours PRN      amoxicillin-clavulanate 875-125 MG per tablet  Commonly known as:  AUGMENTIN   1 tablet, Oral, 2 Times Daily, (First dose evening of 12/3)      HYDROcodone-acetaminophen 5-325 MG per tablet  Commonly known as:  NORCO   1 tablet, Oral, Every 6 Hours PRN      saccharomyces boulardii 250 MG capsule  Commonly known as:  FLORASTOR   250 mg, Oral, 2 Times Daily, Take at least 2 hours after oral antibiotic         Changes to Medications       Instructions Start Date   docusate sodium 100 MG capsule  Commonly known as:  STOOL SOFTENER  What changed:    · when to take this  · reasons to take this  · additional instructions   100 mg, Oral, 2 Times Daily, (Hold for loose stools)         Continue These Medications      Instructions Start Date   albuterol sulfate  (90 Base) MCG/ACT inhaler  Commonly known as:  PROVENTIL HFA;VENTOLIN HFA;PROAIR HFA   2 puffs, Inhalation, Every 4 Hours PRN      atorvastatin 40 MG tablet  Commonly known as:  LIPITOR   TAKE 1 TABLET BY MOUTH EVERY DAY      cholecalciferol 25 MCG (1000 UT) tablet  Commonly known as:  VITAMIN D3   3 tablets, Oral, Every Morning      CLARITIN 10 MG tablet  Generic drug:  loratadine   1 tablet, Oral, Daily      dextromethorphan-guaifenesin  MG per 12 hr tablet  Commonly known as:  MUCINEX DM MAX STRENGTH   Oral, As Needed      escitalopram 10 MG tablet  Commonly known as:  LEXAPRO   TAKE 1 TABLET BY MOUTH EVERY DAY      metFORMIN 1000 MG tablet  Commonly known as:  GLUCOPHAGE   1,000 mg, Oral, 2 Times Daily With Meals      MULTIVITAMIN ADULT PO   1 tablet, Oral, Daily      pantoprazole 40 MG EC tablet  Commonly known as:  PROTONIX   TAKE 1 TABLET BY MOUTH TWICE A DAY         Stop These Medications    lisinopril 10 MG tablet  Commonly known as:  PRINIVIL,ZESTRIL          No Known Allergies    Discharge Disposition:  Home or Self Care    Diet:  Hospital:  Diet Order   Procedures   • Diet Regular; [place], Low Fat, Consistent Carbohydrate     Activity:  Activity Instructions     Other Activity Instructions      No lifting more than 5-10 lbs until cleared by your surgeon (usually 4-6 weeks)   You may shower normally. Do not scrub or soak your incisions   Cover your incisions as needed for drainage. Do not leave covered at all times - leave open to air when able   Contact your surgeon if you develop increased pain, warmth to touch, redness, swelling or purulent/foul-smelling discharge from  your incisions        CODE STATUS:    Code Status and Medical Interventions:   Ordered at: 11/30/19 1501     Level Of Support Discussed With:    Patient     Code Status:    CPR     Medical Interventions (Level of Support Prior to Arrest):    Full     Future Appointments   Date Time Provider Department Center   5/12/2020 11:45 AM Domingo Sood MD [facility]     Additional Instructions for the Follow-ups that You Need to Schedule     Discharge Follow-up with PCP   As directed       Currently Documented PCP:    Domingo Sood MD    PCP Phone Number:    [phone]     Follow Up Details:  PCP in 1 week         Discharge Follow-up with Specified Provider: Petey in 2 weeks   As directed      To:  Petey in 2 weeks             Time Spent on Discharge: 40 minutes    Electronically signed by Salome Corado PA-C, 12/03/19, 9:06 AM.

## 2019-12-03 NOTE — PLAN OF CARE
Problem: Patient Care Overview  Goal: Plan of Care Review  Outcome: Ongoing (interventions implemented as appropriate)   12/03/19 0339   Coping/Psychosocial   Plan of Care Reviewed With patient   Plan of Care Review   Progress improving   OTHER   Outcome Summary VSS, 2L NC at night while sleeping. Using IS and ambulating, pt has passed flatus. minimal complaints of pain, controlled with PRNs. Will continue to monitor.       Problem: Cholecystectomy (Adult)  Goal: Signs and Symptoms of Listed Potential Problems Will be Absent, Minimized or Managed (Cholecystectomy)  Outcome: Ongoing (interventions implemented as appropriate)   12/03/19 0339   Goal/Outcome Evaluation   Problems Assessed (Cholecystectomy) all   Problems Present (Cholecystectomy) pain;situational response

## 2019-12-03 NOTE — PROGRESS NOTES
Continued Stay Note  [facility]     Patient Name: Wallace Dukes  MRN: [id number]  Today's Date: 12/3/2019    Admit Date: 11/30/2019    Discharge Plan     Row Name 12/03/19 1146       Plan    Plan  Plan is home    Patient/Family in Agreement with Plan  yes    Plan Comments  Plan is home today.  No discharge needs identified.    Final Discharge Disposition Code  01 - home or self-care        Discharge Codes    No documentation.       Expected Discharge Date and Time     Expected Discharge Date Expected Discharge Time    Dec 3, 2019             Patsy Prescott RN

## 2019-12-03 NOTE — PROGRESS NOTES
[facility] Medicine Services  PROGRESS NOTE    Patient Name: Wallace Dukes  : 1955  MRN: [id number]    Date of Admission: 2019  Primary Care Physician: Domingo Sood MD    Subjective   Subjective     CC: F/U cholecystitis    HPI:  She is feeling OK today-tolerating some food but no BM or flatus yet.  She has been up walking.    Review of Systems  Gen- No fevers, chills  CV- No chest pain, palpitations  Resp- No cough, dyspnea  GI- As above        Objective   Objective     Vital Signs:   Temp:  [97.4 °F (36.3 °C)-98.5 °F (36.9 °C)] 98.5 °F (36.9 °C)  Heart Rate:  [65-82] 82  Resp:  [16-18] 16  BP: ()/(64-80) 119/68        Physical Exam:  Constitutional: No acute distress, awake, alert, sitting up in bed  HENT: NCAT, mucous membranes moist  Respiratory: Clear to auscultation bilaterally, respiratory effort normal   Cardiovascular: RRR, no murmurs, rubs, or gallops  Gastrointestinal: Hypoactive but present bowel sounds, soft, mildly distended  Musculoskeletal: No bilateral ankle edema  Psychiatric: Appropriate affect, cooperative  Neurologic: Cranial Nerves grossly intact to confrontation, speech clear  Skin: No rashes    Results Reviewed:    Results from last 7 days   Lab Units 19  0512 19   WBC 10*3/mm3 8.12 14.45*   HEMOGLOBIN g/dL 11.3* 13.1   HEMATOCRIT % 35.4 39.7   PLATELETS 10*3/mm3 331 412   PROCALCITONIN ng/mL 0.06* 0.08*     Results from last 7 days   Lab Units 19  0512 19   SODIUM mmol/L 140 138   POTASSIUM mmol/L 3.7 3.6   CHLORIDE mmol/L 101 98   CO2 mmol/L 27.0 26.0   BUN mg/dL 11 12   CREATININE mg/dL 0.97 0.93   GLUCOSE mg/dL 123* 128*   CALCIUM mg/dL 8.5* 9.8   ALT (SGPT) U/L 36* 36*   AST (SGOT) U/L 38* 26   TROPONIN T ng/mL  --  <0.010   PROBNP pg/mL  --  100.9     Estimated Creatinine Clearance: 72.1 mL/min (by C-G formula based on SCr of 0.97 mg/dL).    Microbiology Results Abnormal     Procedure  Component Value - Date/Time    Body Fluid Culture - Body Fluid, Gallbladder [[id number]]  (Abnormal) Collected:  12/01/19 1139    Lab Status:  Preliminary result Specimen:  Body Fluid from Gallbladder Updated:  12/02/19 0917     Body Fluid Culture Moderate growth (3+) Mixed Gram Negative Bhavana     Gram Stain Many (4+) WBCs seen      Rare (1+) Gram negative bacilli    Blood Culture - Blood, Arm, Left [[id number]] Collected:  11/30/19 2005    Lab Status:  Preliminary result Specimen:  Blood from Arm, Left Updated:  12/01/19 2115     Blood Culture No growth at 24 hours    Blood Culture - Blood, Hand, Left [[id number]] Collected:  11/30/19 2045    Lab Status:  Preliminary result Specimen:  Blood from Hand, Left Updated:  12/01/19 2115     Blood Culture No growth at 24 hours          Imaging Results (Last 24 Hours)     ** No results found for the last 24 hours. **               I have reviewed the medications:  Scheduled Meds:  docusate sodium 100 mg Oral BID   famotidine 20 mg Oral BID AC   insulin regular 0-7 Units Subcutaneous Q6H   meloxicam 7.5 mg Oral Daily   piperacillin-tazobactam 3.375 g Intravenous Q8H   sodium chloride 10 mL Intravenous Q12H     Continuous Infusions:   PRN Meds:.•  dextrose  •  dextrose  •  glucagon (human recombinant)  •  HYDROcodone-acetaminophen  •  Morphine **AND** naloxone  •  ondansetron  •  ondansetron  •  sodium chloride  •  sodium chloride      Assessment/Plan   Assessment / Plan     Active Hospital Problems    Diagnosis  POA   • **Acute calculous cholecystitis [K80.00]  Yes   • Major depressive disorder with single episode, in partial remission (CMS/HCC) [F32.4]  Yes   • Venous (peripheral) insufficiency [I87.2]  Yes   • Morbid obesity (CMS/HCC) [E66.01]  Yes   • Mixed hyperlipidemia [E78.2]  Yes   • GERD (gastroesophageal reflux disease) [K21.9]  Yes   • Diabetic neuropathy associated with type 2 diabetes mellitus (CMS/[facility]) [E11.40]  Yes   • Benign essential hypertension [I10]  Yes       Resolved Hospital Problems   No resolved problems to display.        Brief Hospital Course to date:  Wallace Dukes is a 64 y.o. female with depression, HLD, GERD, diabetes mellitus who presents to the ED due to abdominal pain, nausea and vomiting found to have acute cholecystitis.    Acute cholecystitis  -S/P laparoscopic CCY 12/1 by Dr. Angelo  -Blood cultures NGTD.  Intraoperative culture with mixed gram negative deyvi.  -Continue Zosyn    DM2  -A1c 8.2%  -Continue SSI  -Needs close outpatient follow up with PCP.  Only on metformin at home.    HTN  -BP currently stable  -Resume lisinopril at discharge or sooner if needed    DVT Prophylaxis:  SCDs    Disposition: I expect the patient to be discharged home 1-2 days    CODE STATUS:   Code Status and Medical Interventions:   Ordered at: 11/30/19 6201     Level Of Support Discussed With:    Patient     Code Status:    CPR     Medical Interventions (Level of Support Prior to Arrest):    Full         Electronically signed by Carmen Miranda MD, 12/02/19, 7:26 PM.

## 2019-12-03 NOTE — PROGRESS NOTES
Case Management Discharge Note      Final Note: Home         Destination      No service has been selected for the patient.      Durable Medical Equipment      No service has been selected for the patient.      Dialysis/Infusion      No service has been selected for the patient.      Home Medical Care      No service has been selected for the patient.      Therapy      No service has been selected for the patient.      Community Resources      No service has been selected for the patient.             Final Discharge Disposition Code: 01 - home or self-care

## 2019-12-04 ENCOUNTER — READMISSION MANAGEMENT (OUTPATIENT)
Dept: CALL CENTER | Facility: HOSPITAL | Age: 64
End: 2019-12-04

## 2019-12-04 ENCOUNTER — TRANSITIONAL CARE MANAGEMENT TELEPHONE ENCOUNTER (OUTPATIENT)
Dept: INTERNAL MEDICINE | Facility: CLINIC | Age: 64
End: 2019-12-04

## 2019-12-04 NOTE — OUTREACH NOTE
Prep Survey      Responses   Facility patient discharged from?  [place]   Is patient eligible?  Yes   Discharge diagnosis  laparoscopic CCY    Does the patient have one of the following disease processes/diagnoses(primary or secondary)?  General Surgery   Does the patient have Home health ordered?  No   Is there a DME ordered?  No   Prep survey completed?  Yes          Chelsea Ashford RN

## 2019-12-05 ENCOUNTER — READMISSION MANAGEMENT (OUTPATIENT)
Dept: CALL CENTER | Facility: HOSPITAL | Age: 64
End: 2019-12-05

## 2019-12-05 LAB
BACTERIA SPEC AEROBE CULT: NORMAL
BACTERIA SPEC AEROBE CULT: NORMAL

## 2019-12-05 NOTE — OUTREACH NOTE
General Surgery Week 1 Survey      Responses   Facility patient discharged from?  [place]   Does the patient have one of the following disease processes/diagnoses(primary or secondary)?  General Surgery   Is there a successful TCM telephone encounter documented?  Yes          Narcisa Florentino RN

## 2019-12-06 LAB — BACTERIA SPEC ANAEROBE CULT: NORMAL

## 2019-12-10 ENCOUNTER — LAB (OUTPATIENT)
Dept: LAB | Facility: HOSPITAL | Age: 64
End: 2019-12-10

## 2019-12-10 ENCOUNTER — OFFICE VISIT (OUTPATIENT)
Dept: INTERNAL MEDICINE | Facility: CLINIC | Age: 64
End: 2019-12-10

## 2019-12-10 VITALS
TEMPERATURE: 97.9 F | BODY MASS INDEX: 41.32 KG/M2 | HEIGHT: 65 IN | DIASTOLIC BLOOD PRESSURE: 82 MMHG | SYSTOLIC BLOOD PRESSURE: 126 MMHG | HEART RATE: 88 BPM | WEIGHT: 248 LBS

## 2019-12-10 DIAGNOSIS — I10 BENIGN ESSENTIAL HYPERTENSION: ICD-10-CM

## 2019-12-10 DIAGNOSIS — Z90.49 S/P LAPAROSCOPIC CHOLECYSTECTOMY: ICD-10-CM

## 2019-12-10 DIAGNOSIS — I10 BENIGN ESSENTIAL HYPERTENSION: Primary | ICD-10-CM

## 2019-12-10 DIAGNOSIS — E11.42 DIABETIC POLYNEUROPATHY ASSOCIATED WITH TYPE 2 DIABETES MELLITUS (HCC): ICD-10-CM

## 2019-12-10 LAB
ALBUMIN SERPL-MCNC: 4.1 G/DL (ref 3.5–5.2)
ALBUMIN/GLOB SERPL: 1.2 G/DL
ALP SERPL-CCNC: 200 U/L (ref 39–117)
ALT SERPL W P-5'-P-CCNC: 76 U/L (ref 1–33)
ANION GAP SERPL CALCULATED.3IONS-SCNC: 11.1 MMOL/L (ref 5–15)
AST SERPL-CCNC: 64 U/L (ref 1–32)
BASOPHILS # BLD AUTO: 0.08 10*3/MM3 (ref 0–0.2)
BASOPHILS NFR BLD AUTO: 0.8 % (ref 0–1.5)
BILIRUB SERPL-MCNC: 0.4 MG/DL (ref 0.2–1.2)
BUN BLD-MCNC: 12 MG/DL (ref 8–23)
BUN/CREAT SERPL: 12.9 (ref 7–25)
CALCIUM SPEC-SCNC: 9.8 MG/DL (ref 8.6–10.5)
CHLORIDE SERPL-SCNC: 99 MMOL/L (ref 98–107)
CO2 SERPL-SCNC: 27.9 MMOL/L (ref 22–29)
CREAT BLD-MCNC: 0.93 MG/DL (ref 0.57–1)
DEPRECATED RDW RBC AUTO: 41.8 FL (ref 37–54)
EOSINOPHIL # BLD AUTO: 0.52 10*3/MM3 (ref 0–0.4)
EOSINOPHIL NFR BLD AUTO: 5.2 % (ref 0.3–6.2)
ERYTHROCYTE [DISTWIDTH] IN BLOOD BY AUTOMATED COUNT: 12.7 % (ref 12.3–15.4)
GFR SERPL CREATININE-BSD FRML MDRD: 61 ML/MIN/1.73
GLOBULIN UR ELPH-MCNC: 3.5 GM/DL
GLUCOSE BLD-MCNC: 107 MG/DL (ref 65–99)
HCT VFR BLD AUTO: 37.6 % (ref 34–46.6)
HGB BLD-MCNC: 12.8 G/DL (ref 12–15.9)
IMM GRANULOCYTES # BLD AUTO: 0.1 10*3/MM3 (ref 0–0.05)
IMM GRANULOCYTES NFR BLD AUTO: 1 % (ref 0–0.5)
LYMPHOCYTES # BLD AUTO: 2.49 10*3/MM3 (ref 0.7–3.1)
LYMPHOCYTES NFR BLD AUTO: 25.1 % (ref 19.6–45.3)
MCH RBC QN AUTO: 31 PG (ref 26.6–33)
MCHC RBC AUTO-ENTMCNC: 34 G/DL (ref 31.5–35.7)
MCV RBC AUTO: 91 FL (ref 79–97)
MONOCYTES # BLD AUTO: 0.54 10*3/MM3 (ref 0.1–0.9)
MONOCYTES NFR BLD AUTO: 5.4 % (ref 5–12)
NEUTROPHILS # BLD AUTO: 6.21 10*3/MM3 (ref 1.7–7)
NEUTROPHILS NFR BLD AUTO: 62.5 % (ref 42.7–76)
NRBC BLD AUTO-RTO: 0 /100 WBC (ref 0–0.2)
PLATELET # BLD AUTO: 512 10*3/MM3 (ref 140–450)
PMV BLD AUTO: 9.1 FL (ref 6–12)
POTASSIUM BLD-SCNC: 4.6 MMOL/L (ref 3.5–5.2)
PROT SERPL-MCNC: 7.6 G/DL (ref 6–8.5)
RBC # BLD AUTO: 4.13 10*6/MM3 (ref 3.77–5.28)
SODIUM BLD-SCNC: 138 MMOL/L (ref 136–145)
WBC NRBC COR # BLD: 9.94 10*3/MM3 (ref 3.4–10.8)

## 2019-12-10 PROCEDURE — 80053 COMPREHEN METABOLIC PANEL: CPT

## 2019-12-10 PROCEDURE — 85025 COMPLETE CBC W/AUTO DIFF WBC: CPT

## 2019-12-10 PROCEDURE — 99495 TRANSJ CARE MGMT MOD F2F 14D: CPT | Performed by: PHYSICIAN ASSISTANT

## 2019-12-10 NOTE — PROGRESS NOTES
Transitional Care Follow Up Visit  Subjective     Wallace Dukes is a 64 y.o. female who presents for a transitional care management visit.    Within 48 business hours after discharge our office contacted her via telephone to coordinate her care and needs.      I reviewed and discussed the details of that call along with the discharge summary, hospital problems, inpatient lab results, inpatient diagnostic studies, and consultation reports with Wallace.     Current outpatient and discharge medications have been reconciled for the patient.  Reviewed by: Edel Chang PA-C      Date of TCM Phone Call 12/4/2019   [facility]   Date of Admission 11/30/2019   Date of Discharge 12/3/2019   Discharge Disposition Home or Self Care     Risk for Readmission (LACE) Score: 9 (12/3/2019  6:00 AM)    Course During Hospital Stay:   The patient presented to [facility] ED on 11/30/2019 with complaints of abdominal pain, nausea and vomiting. She was found to have acute cholecystitis and was admitted to the hospital medicine service.      She was started on IV Zosyn.Dr. Angelo performed laparoscopic cholecystectomy on 12/1/2019. Purulent bile was encountered intraoperatively. Culture returned with 3+ mixed gram-negative deyvi. She was transitioned from Zosyn to PO Augmentin at discharge for 7 days of treatment.        The following portions of the patient's history were reviewed and updated as appropriate: allergies, current medications, past family history, past medical history, past social history, past surgical history and problem list.    Review of Systems   Constitutional: Positive for fever. Negative for chills and fatigue.   HENT: Negative for congestion, ear pain and sinus pressure.    Respiratory: Negative for cough, chest tightness, shortness of breath and wheezing.    Cardiovascular: Negative for chest pain and palpitations.   Gastrointestinal: Negative for abdominal pain, blood  in stool and constipation.   Endocrine: Negative for cold intolerance and heat intolerance.   Skin: Negative for color change.   Allergic/Immunologic: Negative for environmental allergies.   Neurological: Negative for dizziness, speech difficulty and headaches.   Psychiatric/Behavioral: Negative for confusion. The patient is not nervous/anxious.        Objective   Physical Exam   Constitutional: She is oriented to person, place, and time. She appears well-developed and well-nourished.   HENT:   Head: Normocephalic and atraumatic.   Nose: Nose normal.   Mouth/Throat: Oropharynx is clear and moist.   Eyes: Pupils are equal, round, and reactive to light. Conjunctivae and EOM are normal.   Neck: Normal range of motion. Neck supple.   Cardiovascular: Normal rate, regular rhythm, normal heart sounds and intact distal pulses.   Pulmonary/Chest: Effort normal and breath sounds normal.   Abdominal: Soft. Bowel sounds are normal. There is tenderness.   Neurological: She is alert and oriented to person, place, and time.   Skin: Skin is warm and dry.   Surgical incisions are clean, dry, with no signs of infection.   Psychiatric: She has a normal mood and affect. Her behavior is normal. Judgment and thought content normal.   Nursing note and vitals reviewed.      Assessment/Plan   Problems Addressed this Visit        Cardiovascular and Mediastinum    Benign essential hypertension - Primary    Relevant Orders    CBC & Differential (Completed)    Comprehensive Metabolic Panel (Completed)       Endocrine    Diabetic neuropathy associated with type 2 diabetes mellitus (CMS/HCC)    Relevant Orders    CBC & Differential (Completed)    Comprehensive Metabolic Panel (Completed)       Other    S/P laparoscopic cholecystectomy    Relevant Orders    CBC & Differential (Completed)    Comprehensive Metabolic Panel (Completed)      Labs today.  Follow up with surgery as scheduled.    Plan of care reviewed with patient at the conclusion of  today's visit. Education was provided regarding diagnosis, management and any prescribed or recommended OTC medications.  Patient verbalizes understanding of and agreement with management plan.     Return if symptoms worsen or fail to improve.    Edel Chang

## 2019-12-11 ENCOUNTER — PATIENT MESSAGE (OUTPATIENT)
Dept: INTERNAL MEDICINE | Facility: CLINIC | Age: 64
End: 2019-12-11

## 2019-12-11 RX ORDER — ONDANSETRON 4 MG/1
4 TABLET, FILM COATED ORAL EVERY 8 HOURS PRN
Qty: 30 TABLET | Refills: 0 | Status: SHIPPED | OUTPATIENT
Start: 2019-12-11 | End: 2020-01-27

## 2019-12-12 ENCOUNTER — READMISSION MANAGEMENT (OUTPATIENT)
Dept: CALL CENTER | Facility: HOSPITAL | Age: 64
End: 2019-12-12

## 2019-12-12 NOTE — OUTREACH NOTE
General Surgery Week 2 Survey      Responses   Facility patient discharged from?  [place]   Does the patient have one of the following disease processes/diagnoses(primary or secondary)?  General Surgery   Week 2 attempt successful?  No   Unsuccessful attempts  Attempt 1          Mirlande Rivero RN

## 2019-12-13 ENCOUNTER — READMISSION MANAGEMENT (OUTPATIENT)
Dept: CALL CENTER | Facility: HOSPITAL | Age: 64
End: 2019-12-13

## 2019-12-13 NOTE — OUTREACH NOTE
General Surgery Week 2 Survey      Responses   Facility patient discharged from?  [place]   Does the patient have one of the following disease processes/diagnoses(primary or secondary)?  General Surgery   Week 2 attempt successful?  Yes   Call start time  1814   Call end time  1821   Discharge diagnosis  laparoscopic CCY    Meds reviewed with patient/caregiver?  Yes   Is the patient having any side effects they believe may be caused by any medication additions or changes?  No   Does the patient have all medications related to this admission filled (includes all antibiotics, pain medications, etc.)  Yes   Is the patient taking all medications as directed (includes completed medication regime)?  Yes   Does the patient have a follow up appointment scheduled with their surgeon?  Yes   Has the patient kept scheduled appointments due by today?  Yes   Has home health visited the patient within 72 hours of discharge?  N/A   Psychosocial issues?  No   Did the patient receive a copy of their discharge instructions?  Yes   Nursing interventions  Reviewed instructions with patient   What is the patient's perception of their health status since discharge?  Improving   Nursing interventions  Nurse provided patient education   Is the patient /caregiver able to teach back basic post-op care?  Continue use of incentive spirometry at least 1 week post discharge, Practice 'cough and deep breath', Drive as instructed by MD in discharge instructions, Do not remove steri-strips, Lifting as instructed by MD in discharge instructions, Take showers only when approved by MD-sponge bathe until then   Is the patient/caregiver able to teach back signs and symptoms of incisional infection?  Increased redness, swelling or pain at the incisonal site, Increased drainage or bleeding, Incisional warmth, Pus or odor from incision, Fever   Is the patient/caregiver able to teach back steps to recovery at home?  Set small, achievable goals for return  to baseline health, Rest and rebuild strength, gradually increase activity, Eat a well-balance diet, Make a list of questions for surgeon's appointment   Is the patient/caregiver able to teach back the hierarchy of who to call/visit for symptoms/problems? PCP, Specialist, Home health nurse, Urgent Care, ED, 911  Yes   Additional teach back comments  She is having a great deal of itching, she says the sites all look good, she continues with nausea, had low temps earlier this week.  Encouraged emailing via [employer] all s/s.   Week 2 call completed?  Yes          Lisa Duenas RN

## 2019-12-15 PROBLEM — Z90.49 S/P LAPAROSCOPIC CHOLECYSTECTOMY: Status: ACTIVE | Noted: 2019-12-15

## 2019-12-23 ENCOUNTER — READMISSION MANAGEMENT (OUTPATIENT)
Dept: CALL CENTER | Facility: HOSPITAL | Age: 64
End: 2019-12-23

## 2019-12-23 NOTE — OUTREACH NOTE
General Surgery Week 3 Survey      Responses   Facility patient discharged from?  [place]   Does the patient have one of the following disease processes/diagnoses(primary or secondary)?  General Surgery   Week 3 attempt successful?  No   Unsuccessful attempts  Attempt 1          Merissa Aguilar RN

## 2019-12-26 ENCOUNTER — READMISSION MANAGEMENT (OUTPATIENT)
Dept: CALL CENTER | Facility: HOSPITAL | Age: 64
End: 2019-12-26

## 2019-12-26 NOTE — OUTREACH NOTE
General Surgery Week 3 Survey      Responses   Facility patient discharged from?  [place]   Does the patient have one of the following disease processes/diagnoses(primary or secondary)?  General Surgery   Week 3 attempt successful?  Yes   Call start time  1447   Call end time  1455   Meds reviewed with patient/caregiver?  Yes   Is the patient taking all medications as directed (includes completed medication regime)?  Yes   Medication comments  antibiotics done   Has the patient kept scheduled appointments due by today?  Yes   What is the patient's perception of their health status since discharge?  Improving   Week 3 call completed?  Yes   Revoked  No further contact(revokes)-requires comment   Graduated/Revoked comments  req. no more calls          Patsy Cooley RN

## 2020-01-03 ENCOUNTER — TELEPHONE (OUTPATIENT)
Dept: INTERNAL MEDICINE | Facility: CLINIC | Age: 65
End: 2020-01-03

## 2020-01-03 DIAGNOSIS — E11.42 DIABETIC POLYNEUROPATHY ASSOCIATED WITH TYPE 2 DIABETES MELLITUS (HCC): ICD-10-CM

## 2020-01-03 DIAGNOSIS — I10 BENIGN ESSENTIAL HYPERTENSION: Primary | ICD-10-CM

## 2020-01-03 RX ORDER — AMOXICILLIN AND CLAVULANATE POTASSIUM 500; 125 MG/1; MG/1
1 TABLET, FILM COATED ORAL 2 TIMES DAILY
Qty: 16 TABLET | Refills: 0 | Status: SHIPPED | OUTPATIENT
Start: 2020-01-03 | End: 2020-01-11

## 2020-01-03 RX ORDER — BENZONATATE 100 MG/1
100 CAPSULE ORAL 3 TIMES DAILY PRN
Qty: 30 CAPSULE | Refills: 0 | Status: SHIPPED | OUTPATIENT
Start: 2020-01-03 | End: 2020-01-13

## 2020-01-03 NOTE — TELEPHONE ENCOUNTER
Message as noted, please place a CBC and CMP in the lab, and send in a prescription for Augmentin 500 mg 1 twice daily for 8 days to the patient's drugstore.

## 2020-01-03 NOTE — TELEPHONE ENCOUNTER
Called and discussed with pt. She was to have labs repeated per Edel's note. Will send to Edel.     Re: the cough, congestion, fever - she has been taking mucinex and using an inhaler. She states she has used tessalon perles before and they seem to help. She is very congested and coughing she states is severe.

## 2020-01-03 NOTE — TELEPHONE ENCOUNTER
----- Message from Wallace Dukes sent at 1/3/2020  2:36 AM EST -----  Regarding: Non-Urgent Medical Question  Contact: [phone]  Would it be possible to have meds called in ? Started Monday, I have congestion, wheezing, coughing (lots), 100.4 fever, started last night.  I have been taking Mucinex 12 hour, day/night liquid meds, inhaler.      Also, on 12/16 my phone call from [facility] mentioned to have blood drawn again in 2 weeks, said orders would be on file. I went Monday 12/30 to have blood drawn was told there were no orders.  Did I misunderstand I was to have blood drawn?  Should I try again?    Thank you - Barbara Rene [phone]

## 2020-01-08 ENCOUNTER — LAB (OUTPATIENT)
Dept: LAB | Facility: HOSPITAL | Age: 65
End: 2020-01-08

## 2020-01-08 DIAGNOSIS — I10 BENIGN ESSENTIAL HYPERTENSION: ICD-10-CM

## 2020-01-08 LAB
ALBUMIN SERPL-MCNC: 4.3 G/DL (ref 3.5–5.2)
ALBUMIN/GLOB SERPL: 1.3 G/DL
ALP SERPL-CCNC: 135 U/L (ref 39–117)
ALT SERPL W P-5'-P-CCNC: 38 U/L (ref 1–33)
ANION GAP SERPL CALCULATED.3IONS-SCNC: 15.2 MMOL/L (ref 5–15)
AST SERPL-CCNC: 28 U/L (ref 1–32)
BASOPHILS # BLD AUTO: 0.07 10*3/MM3 (ref 0–0.2)
BASOPHILS NFR BLD AUTO: 0.7 % (ref 0–1.5)
BILIRUB SERPL-MCNC: 0.4 MG/DL (ref 0.2–1.2)
BUN BLD-MCNC: 12 MG/DL (ref 8–23)
BUN/CREAT SERPL: 11.4 (ref 7–25)
CALCIUM SPEC-SCNC: 9.8 MG/DL (ref 8.6–10.5)
CHLORIDE SERPL-SCNC: 97 MMOL/L (ref 98–107)
CO2 SERPL-SCNC: 25.8 MMOL/L (ref 22–29)
CREAT BLD-MCNC: 1.05 MG/DL (ref 0.57–1)
DEPRECATED RDW RBC AUTO: 41.8 FL (ref 37–54)
EOSINOPHIL # BLD AUTO: 0.33 10*3/MM3 (ref 0–0.4)
EOSINOPHIL NFR BLD AUTO: 3.4 % (ref 0.3–6.2)
ERYTHROCYTE [DISTWIDTH] IN BLOOD BY AUTOMATED COUNT: 12.8 % (ref 12.3–15.4)
GFR SERPL CREATININE-BSD FRML MDRD: 53 ML/MIN/1.73
GLOBULIN UR ELPH-MCNC: 3.4 GM/DL
GLUCOSE BLD-MCNC: 160 MG/DL (ref 65–99)
HCT VFR BLD AUTO: 41.6 % (ref 34–46.6)
HGB BLD-MCNC: 13.8 G/DL (ref 12–15.9)
IMM GRANULOCYTES # BLD AUTO: 0.1 10*3/MM3 (ref 0–0.05)
IMM GRANULOCYTES NFR BLD AUTO: 1 % (ref 0–0.5)
LYMPHOCYTES # BLD AUTO: 2.7 10*3/MM3 (ref 0.7–3.1)
LYMPHOCYTES NFR BLD AUTO: 27.8 % (ref 19.6–45.3)
MCH RBC QN AUTO: 30.1 PG (ref 26.6–33)
MCHC RBC AUTO-ENTMCNC: 33.2 G/DL (ref 31.5–35.7)
MCV RBC AUTO: 90.6 FL (ref 79–97)
MONOCYTES # BLD AUTO: 0.53 10*3/MM3 (ref 0.1–0.9)
MONOCYTES NFR BLD AUTO: 5.5 % (ref 5–12)
NEUTROPHILS # BLD AUTO: 5.97 10*3/MM3 (ref 1.7–7)
NEUTROPHILS NFR BLD AUTO: 61.6 % (ref 42.7–76)
NRBC BLD AUTO-RTO: 0 /100 WBC (ref 0–0.2)
PLATELET # BLD AUTO: 414 10*3/MM3 (ref 140–450)
PMV BLD AUTO: 9.5 FL (ref 6–12)
POTASSIUM BLD-SCNC: 4.8 MMOL/L (ref 3.5–5.2)
PROT SERPL-MCNC: 7.7 G/DL (ref 6–8.5)
RBC # BLD AUTO: 4.59 10*6/MM3 (ref 3.77–5.28)
SODIUM BLD-SCNC: 138 MMOL/L (ref 136–145)
WBC NRBC COR # BLD: 9.7 10*3/MM3 (ref 3.4–10.8)

## 2020-01-08 PROCEDURE — 80053 COMPREHEN METABOLIC PANEL: CPT

## 2020-01-08 PROCEDURE — 85025 COMPLETE CBC W/AUTO DIFF WBC: CPT

## 2020-01-27 ENCOUNTER — OFFICE VISIT (OUTPATIENT)
Dept: INTERNAL MEDICINE | Facility: CLINIC | Age: 65
End: 2020-01-27

## 2020-01-27 VITALS
HEIGHT: 65 IN | WEIGHT: 241.5 LBS | HEART RATE: 76 BPM | DIASTOLIC BLOOD PRESSURE: 100 MMHG | SYSTOLIC BLOOD PRESSURE: 162 MMHG | BODY MASS INDEX: 40.24 KG/M2

## 2020-01-27 DIAGNOSIS — E78.2 MIXED HYPERLIPIDEMIA: ICD-10-CM

## 2020-01-27 DIAGNOSIS — E11.42 DIABETIC POLYNEUROPATHY ASSOCIATED WITH TYPE 2 DIABETES MELLITUS (HCC): Primary | ICD-10-CM

## 2020-01-27 DIAGNOSIS — I10 BENIGN ESSENTIAL HYPERTENSION: ICD-10-CM

## 2020-01-27 PROCEDURE — 99213 OFFICE O/P EST LOW 20 MIN: CPT | Performed by: INTERNAL MEDICINE

## 2020-01-27 NOTE — PROGRESS NOTES
[facility]     Wallace Dukes  1955   [id number]      Patient Care Team:  Domingo Sood MD as PCP - General  Domingo Sood MD as PCP - Family Medicine    Chief Complaint::   Chief Complaint   Patient presents with   • Hyperlipidemia   • Hypertension   • Diabetes        HPI  Ms. Wynn comes in for follow-up of her diabetes, hypertension and hyperlipidemia.  She is now about 6 weeks status post cholecystectomy.  She is done well.  She has an occasional loose stool but this is not every day.  Her appetite is back to normal.  Her incisions have healed well.  Blood pressures at home have been running in the 130/80 range.    Chronic Conditions:      Patient Active Problem List   Diagnosis   • GERD (gastroesophageal reflux disease)   • Diabetic neuropathy associated with type 2 diabetes mellitus (CMS/HCC)   • Benign essential hypertension   • Venous (peripheral) insufficiency   • Mixed hyperlipidemia   • Allergic rhinitis   • Morbid obesity (CMS/HCC)   • Right elbow pain   • Type 2 diabetes mellitus with diabetic polyneuropathy, without long-term current use of insulin (CMS/HCC)   • Major depressive disorder with single episode, in partial remission (CMS/HCC)   • Acute calculous cholecystitis   • S/P laparoscopic cholecystectomy        Past Medical History:   Diagnosis Date   • Cancer of breast (CMS/HCC) 2014   • Chest pain 2009    stress nuclear   • Cholelithiasis 2019    Surgery   • MRSA (methicillin resistant Staphylococcus aureus)     infection of right breast expander; bilateral removal of expanders   • Schwannoma 06/2016    left translabyrinthine craniotomy       Past Surgical History:   Procedure Laterality Date   • BREAST RECONSTRUCTION, BREAST TISSUE EXPANDER INSERTION Bilateral 07/2013   • BREAST SURGERY  2014   • BREAST TISSUE EXPANDER REMOVAL INSERTION OF IMPLANT Bilateral     bilateral removal of expanders   • CHOLECYSTECTOMY N/A 12/1/2019    Procedure: CHOLECYSTECTOMY  LAPAROSCOPIC;  Surgeon: Rafiq Angelo MD;  Location: [facility];  Service: General   • COLONOSCOPY  2018   • CRANIOTOMY FOR TUMOR  06/2016    Left Translabyrithine craniotomy   • HYSTERECTOMY  1986   • MASTECTOMY Bilateral 2014    complicated by MRSA wound infection on right requiring removal of expander.       Family History   Problem Relation Age of Onset   • Hypertension Mother    • Diabetes Mother    • Migraines Mother    • Arthritis Mother    • Thyroid disease Mother    • Diabetes Sister    • Heart disease Sister    • Hypertension Sister    • Hypertension Brother    • Heart disease Brother    • Diabetes Son    • Heart disease Father    • Vision loss Maternal Grandmother        Social History     Socioeconomic History   • Marital status:      Spouse name: Not on file   • Number of children: Not on file   • Years of education: Not on file   • Highest education level: Not on file   Tobacco Use   • Smoking status: Never Smoker   • Smokeless tobacco: Never Used   Substance and Sexual Activity   • Alcohol use: No     Frequency: Never   • Drug use: No   • Sexual activity: Not Currently     Birth control/protection: None       No Known Allergies      Current Outpatient Medications:   •  acetaminophen (TYLENOL) 325 MG tablet, Take 2 tablets by mouth Every 6 (Six) Hours As Needed for Mild Pain  or Moderate Pain  (Do not exceed 4,000mg in 24 hours)., Disp: , Rfl:   •  ALBUTEROL SULFATE  (90 Base) MCG/ACT inhaler, INHALE 2 PUFFS EVERY 4 (FOUR) HOURS AS NEEDED FOR WHEEZING OR SHORTNESS OF AIR., Disp: 18 inhaler, Rfl: 3  •  atorvastatin (LIPITOR) 40 MG tablet, TAKE 1 TABLET BY MOUTH EVERY DAY, Disp: 90 tablet, Rfl: 1  •  cholecalciferol (VITAMIN D3) 1000 units tablet, Take 2 tablets by mouth Every Morning., Disp: , Rfl:   •  dextromethorphan-guaifenesin (MUCINEX DM MAX STRENGTH)  MG per 12 hr tablet, Take  by mouth As Needed., Disp: , Rfl:   •  escitalopram (LEXAPRO) 10 MG tablet, TAKE 1 TABLET  "BY MOUTH EVERY DAY, Disp: 90 tablet, Rfl: 1  •  loratadine (CLARITIN) 10 MG tablet, Take 1 tablet by mouth Daily., Disp: , Rfl:   •  metFORMIN (GLUCOPHAGE) 1000 MG tablet, Take 1 tablet by mouth 2 (Two) Times a Day With Meals., Disp: 60 tablet, Rfl: 5  •  Multiple Vitamins-Minerals (MULTIVITAMIN ADULT PO), Take 1 tablet by mouth Daily., Disp: , Rfl:   •  docusate sodium (STOOL SOFTENER) 100 MG capsule, Take 1 capsule by mouth 2 (Two) Times a Day. (Hold for loose stools), Disp: , Rfl:   •  pantoprazole (PROTONIX) 40 MG EC tablet, TAKE 1 TABLET BY MOUTH TWICE A DAY, Disp: 180 tablet, Rfl: 1    Review of Systems   Constitutional: Negative for chills, fatigue and fever.   HENT: Negative for congestion, ear pain and sinus pressure.    Respiratory: Negative for cough, chest tightness, shortness of breath and wheezing.    Cardiovascular: Negative for chest pain and palpitations.   Gastrointestinal: Negative for abdominal pain, blood in stool and constipation.   Skin: Negative for color change.   Allergic/Immunologic: Negative for environmental allergies.   Neurological: Negative for dizziness, speech difficulty and headache.   Psychiatric/Behavioral: Negative for decreased concentration. The patient is not nervous/anxious.         Vital Signs  Vitals:    01/27/20 0953   BP: 162/100   BP Location: Left arm   Patient Position: Sitting   Cuff Size: Adult   Pulse: 76   Weight: 110 kg (241 lb 8 oz)   Height: 165.1 cm (65\")   PainSc: 0-No pain       Physical Exam   Constitutional: She is oriented to person, place, and time. She appears well-developed and well-nourished.   HENT:   Head: Normocephalic and atraumatic.   Cardiovascular: Normal rate, regular rhythm and normal heart sounds.   No murmur heard.  Pulmonary/Chest: Effort normal and breath sounds normal.   Neurological: She is alert and oriented to person, place, and time.   Psychiatric: She has a normal mood and affect.   Vitals reviewed.     Procedures    ACE III MINI    "          Assessment/Plan:    Wallace was seen today for hyperlipidemia, hypertension and diabetes.    Diagnoses and all orders for this visit:    Diabetic polyneuropathy associated with type 2 diabetes mellitus (CMS/[facility])    Benign essential hypertension    Mixed hyperlipidemia    Plan    A1c was elevated at 7.8 prior to surgery.  She has lost some weight, she will continue current medications and follow-up in May as scheduled.    Blood pressure is elevated today but has been well controlled at home.  I have asked her to continue to monitor and let me know if she has elevations.    Lipids are well controlled.    Plan of care reviewed with patient at the conclusion of today's visit. Education was provided regarding diagnosis, management, and any prescribed or recommended OTC medications.Patient verbalizes understanding of and agreement with management plan.         Domingo Sood MD

## 2020-03-06 RX ORDER — ESCITALOPRAM OXALATE 10 MG/1
TABLET ORAL
Qty: 90 TABLET | Refills: 1 | Status: SHIPPED | OUTPATIENT
Start: 2020-03-06 | End: 2020-08-31

## 2020-03-26 RX ORDER — LOSARTAN POTASSIUM 50 MG/1
50 TABLET ORAL DAILY
Qty: 30 TABLET | Refills: 1 | Status: SHIPPED | OUTPATIENT
Start: 2020-03-26 | End: 2020-04-10 | Stop reason: SDUPTHER

## 2020-04-09 RX ORDER — CHLORTHALIDONE 25 MG/1
25 TABLET ORAL DAILY
Qty: 30 TABLET | Refills: 2 | Status: SHIPPED | OUTPATIENT
Start: 2020-04-09 | End: 2020-05-12 | Stop reason: SDUPTHER

## 2020-04-10 RX ORDER — LOSARTAN POTASSIUM 100 MG/1
100 TABLET ORAL DAILY
Qty: 90 TABLET | Refills: 1 | Status: SHIPPED | OUTPATIENT
Start: 2020-04-10 | End: 2020-07-13 | Stop reason: SDUPTHER

## 2020-05-12 ENCOUNTER — LAB (OUTPATIENT)
Dept: LAB | Facility: HOSPITAL | Age: 65
End: 2020-05-12

## 2020-05-12 ENCOUNTER — OFFICE VISIT (OUTPATIENT)
Dept: INTERNAL MEDICINE | Facility: CLINIC | Age: 65
End: 2020-05-12

## 2020-05-12 VITALS
SYSTOLIC BLOOD PRESSURE: 111 MMHG | WEIGHT: 242.8 LBS | DIASTOLIC BLOOD PRESSURE: 87 MMHG | BODY MASS INDEX: 40.45 KG/M2 | HEIGHT: 65 IN | HEART RATE: 87 BPM | TEMPERATURE: 97.3 F

## 2020-05-12 DIAGNOSIS — I10 BENIGN ESSENTIAL HYPERTENSION: ICD-10-CM

## 2020-05-12 DIAGNOSIS — E78.2 MIXED HYPERLIPIDEMIA: ICD-10-CM

## 2020-05-12 DIAGNOSIS — E11.42 DIABETIC POLYNEUROPATHY ASSOCIATED WITH TYPE 2 DIABETES MELLITUS (HCC): ICD-10-CM

## 2020-05-12 DIAGNOSIS — E11.42 DIABETIC POLYNEUROPATHY ASSOCIATED WITH TYPE 2 DIABETES MELLITUS (HCC): Primary | ICD-10-CM

## 2020-05-12 DIAGNOSIS — E66.01 MORBID OBESITY (HCC): ICD-10-CM

## 2020-05-12 DIAGNOSIS — F32.4 MAJOR DEPRESSIVE DISORDER WITH SINGLE EPISODE, IN PARTIAL REMISSION (HCC): ICD-10-CM

## 2020-05-12 LAB
ALBUMIN SERPL-MCNC: 4.3 G/DL (ref 3.5–5.2)
ALBUMIN/GLOB SERPL: 1.3 G/DL
ALP SERPL-CCNC: 119 U/L (ref 39–117)
ALT SERPL W P-5'-P-CCNC: 45 U/L (ref 1–33)
ANION GAP SERPL CALCULATED.3IONS-SCNC: 14.9 MMOL/L (ref 5–15)
AST SERPL-CCNC: 37 U/L (ref 1–32)
BILIRUB SERPL-MCNC: 0.4 MG/DL (ref 0.2–1.2)
BUN BLD-MCNC: 22 MG/DL (ref 8–23)
BUN/CREAT SERPL: 17.2 (ref 7–25)
CALCIUM SPEC-SCNC: 10 MG/DL (ref 8.6–10.5)
CHLORIDE SERPL-SCNC: 96 MMOL/L (ref 98–107)
CHOLEST SERPL-MCNC: 199 MG/DL (ref 0–200)
CO2 SERPL-SCNC: 27.1 MMOL/L (ref 22–29)
CREAT BLD-MCNC: 1.28 MG/DL (ref 0.57–1)
GFR SERPL CREATININE-BSD FRML MDRD: 42 ML/MIN/1.73
GLOBULIN UR ELPH-MCNC: 3.2 GM/DL
GLUCOSE BLD-MCNC: 157 MG/DL (ref 65–99)
HBA1C MFR BLD: 7.77 % (ref 4.8–5.6)
HDLC SERPL-MCNC: 51 MG/DL (ref 40–60)
LDLC SERPL CALC-MCNC: 89 MG/DL (ref 0–100)
LDLC/HDLC SERPL: 1.75 {RATIO}
POTASSIUM BLD-SCNC: 4.4 MMOL/L (ref 3.5–5.2)
PROT SERPL-MCNC: 7.5 G/DL (ref 6–8.5)
SODIUM BLD-SCNC: 138 MMOL/L (ref 136–145)
TRIGL SERPL-MCNC: 294 MG/DL (ref 0–150)
VLDLC SERPL-MCNC: 58.8 MG/DL (ref 5–40)

## 2020-05-12 PROCEDURE — 83036 HEMOGLOBIN GLYCOSYLATED A1C: CPT

## 2020-05-12 PROCEDURE — 99214 OFFICE O/P EST MOD 30 MIN: CPT | Performed by: INTERNAL MEDICINE

## 2020-05-12 PROCEDURE — 80061 LIPID PANEL: CPT

## 2020-05-12 PROCEDURE — 80053 COMPREHEN METABOLIC PANEL: CPT

## 2020-05-12 RX ORDER — CHLORTHALIDONE 25 MG/1
25 TABLET ORAL DAILY
Qty: 90 TABLET | Refills: 1 | Status: SHIPPED | OUTPATIENT
Start: 2020-05-12 | End: 2020-11-02 | Stop reason: SDUPTHER

## 2020-05-12 RX ORDER — MULTIVIT-MIN/IRON/FOLIC ACID/K 18-600-40
1 CAPSULE ORAL 2 TIMES DAILY
COMMUNITY
Start: 2020-03-01

## 2020-05-12 NOTE — PROGRESS NOTES
[facility]     Wallace Dukes  1955   [id number]      Patient Care Team:  Domingo Sood MD as PCP - General  Domingo Sood MD as PCP - Family Medicine    Chief Complaint::   Chief Complaint   Patient presents with   • Hypertension   • Hyperlipidemia   • Diabetes   • Depression        HPI  Ms. Wynn comes in for follow-up of her diabetes, hypertension, hyperlipidemia, obesity and depression.  She feels well.  Recently she developed blood pressure elevations.  We adjusted her medications several times resulting in now very good control.  She admits that prior to treatment she had headaches.  Otherwise she feels well.  She admits she is not walking.  She is helping her friend make masks.  She is wearing a mask in public, she is social distancing and trying to stay safe.  There is no fever, cough or shortness of breath.  There is no chest pain.    Chronic Conditions:      Patient Active Problem List   Diagnosis   • GERD (gastroesophageal reflux disease)   • Diabetic neuropathy associated with type 2 diabetes mellitus (CMS/HCC)   • Benign essential hypertension   • Venous (peripheral) insufficiency   • Mixed hyperlipidemia   • Allergic rhinitis   • Morbid obesity (CMS/HCC)   • Right elbow pain   • Type 2 diabetes mellitus with diabetic polyneuropathy, without long-term current use of insulin (CMS/HCC)   • Major depressive disorder with single episode, in partial remission (CMS/HCC)   • Acute calculous cholecystitis   • S/P laparoscopic cholecystectomy        Past Medical History:   Diagnosis Date   • Cancer of breast (CMS/HCC) 2014   • Chest pain 2009    stress nuclear   • Cholelithiasis 2019    Surgery   • MRSA (methicillin resistant Staphylococcus aureus)     infection of right breast expander; bilateral removal of expanders   • Schwannoma 06/2016    left translabyrinthine craniotomy       Past Surgical History:   Procedure Laterality Date   • BREAST RECONSTRUCTION, BREAST  TISSUE EXPANDER INSERTION Bilateral 07/2013   • BREAST SURGERY  2014   • BREAST TISSUE EXPANDER REMOVAL INSERTION OF IMPLANT Bilateral     bilateral removal of expanders   • CHOLECYSTECTOMY N/A 12/1/2019    Procedure: CHOLECYSTECTOMY LAPAROSCOPIC;  Surgeon: Rafiq Angelo MD;  Location: [facility];  Service: General   • COLONOSCOPY  2018   • CRANIOTOMY FOR TUMOR  06/2016    Left Translabyrithine craniotomy   • HYSTERECTOMY  1986   • MASTECTOMY Bilateral 2014    complicated by MRSA wound infection on right requiring removal of expander.       Family History   Problem Relation Age of Onset   • Hypertension Mother    • Diabetes Mother    • Migraines Mother    • Arthritis Mother    • Thyroid disease Mother    • Diabetes Sister    • Heart disease Sister    • Hypertension Sister    • Hypertension Brother    • Heart disease Brother    • Diabetes Son    • Heart disease Father    • Vision loss Maternal Grandmother        Social History     Socioeconomic History   • Marital status:      Spouse name: Not on file   • Number of children: Not on file   • Years of education: Not on file   • Highest education level: Not on file   Tobacco Use   • Smoking status: Never Smoker   • Smokeless tobacco: Never Used   Substance and Sexual Activity   • Alcohol use: No     Frequency: Never   • Drug use: No   • Sexual activity: Not Currently     Birth control/protection: None       No Known Allergies      Current Outpatient Medications:   •  acetaminophen (TYLENOL) 325 MG tablet, Take 2 tablets by mouth Every 6 (Six) Hours As Needed for Mild Pain  or Moderate Pain  (Do not exceed 4,000mg in 24 hours)., Disp: , Rfl:   •  ALBUTEROL SULFATE  (90 Base) MCG/ACT inhaler, INHALE 2 PUFFS EVERY 4 (FOUR) HOURS AS NEEDED FOR WHEEZING OR SHORTNESS OF AIR., Disp: 18 inhaler, Rfl: 3  •  Ascorbic Acid (VITAMIN C) 500 MG capsule, Take 2 capsules by mouth Daily., Disp: , Rfl:   •  atorvastatin (LIPITOR) 40 MG tablet, TAKE 1 TABLET BY  MOUTH EVERY DAY, Disp: 90 tablet, Rfl: 1  •  chlorthalidone (HYGROTON) 25 MG tablet, Take 1 tablet by mouth Daily., Disp: 90 tablet, Rfl: 1  •  cholecalciferol (VITAMIN D3) 1000 units tablet, Take 2 tablets by mouth Every Morning., Disp: , Rfl:   •  dextromethorphan-guaifenesin (MUCINEX DM MAX STRENGTH)  MG per 12 hr tablet, Take  by mouth As Needed., Disp: , Rfl:   •  escitalopram (LEXAPRO) 10 MG tablet, TAKE 1 TABLET BY MOUTH EVERY DAY, Disp: 90 tablet, Rfl: 1  •  loratadine (CLARITIN) 10 MG tablet, Take 1 tablet by mouth Daily., Disp: , Rfl:   •  losartan (COZAAR) 100 MG tablet, Take 1 tablet by mouth Daily., Disp: 90 tablet, Rfl: 1  •  metFORMIN (GLUCOPHAGE) 1000 MG tablet, Take 1 tablet by mouth 2 (Two) Times a Day With Meals., Disp: 180 tablet, Rfl: 3  •  Multiple Vitamins-Minerals (MULTIVITAMIN ADULT PO), Take 1 tablet by mouth Daily., Disp: , Rfl:   •  pantoprazole (PROTONIX) 40 MG EC tablet, TAKE 1 TABLET BY MOUTH TWICE A DAY, Disp: 180 tablet, Rfl: 1    Review of Systems   Constitutional: Negative for chills, fatigue and fever.   HENT: Negative for congestion, ear pain and sinus pressure.    Eyes: Negative for blurred vision.   Respiratory: Negative for cough, chest tightness, shortness of breath and wheezing.    Cardiovascular: Negative for chest pain and palpitations.   Gastrointestinal: Negative for abdominal pain, blood in stool and constipation.   Musculoskeletal: Negative for neck pain.   Skin: Negative for color change.   Allergic/Immunologic: Negative for environmental allergies.   Neurological: Negative for dizziness, speech difficulty and headache.   Psychiatric/Behavioral: Negative for decreased concentration. The patient is not nervous/anxious.         Vital Signs  Vitals:    05/12/20 0810 05/12/20 0815   BP: 118/84 111/87  Comment: pt monitor   BP Location: Left arm Left arm   Patient Position: Sitting Sitting   Cuff Size: Large Adult Adult   Pulse: 80 87  Comment: pt monitor   Temp:  "97.3 °F (36.3 °C)    Weight: 110 kg (242 lb 12.8 oz)    Height: 165.1 cm (65\")    PainSc: 0-No pain        Physical Exam   Constitutional: She is oriented to person, place, and time. She appears well-developed and well-nourished.   HENT:   Head: Normocephalic and atraumatic.   Cardiovascular: Normal rate, regular rhythm and normal heart sounds.   No murmur heard.  Pulmonary/Chest: Effort normal and breath sounds normal.   Neurological: She is alert and oriented to person, place, and time.   Psychiatric: She has a normal mood and affect.   Vitals reviewed.     Procedures    ACE III MINI             Assessment/Plan:    Wallace was seen today for hypertension, hyperlipidemia, diabetes and depression.    Diagnoses and all orders for this visit:    Diabetic polyneuropathy associated with type 2 diabetes mellitus (CMS/HCC)  -     Hemoglobin A1c; Future    Benign essential hypertension  -     Comprehensive Metabolic Panel; Future    Mixed hyperlipidemia  -     Lipid Panel; Future    Morbid obesity (CMS/HCC)    Major depressive disorder with single episode, in partial remission (CMS/HCC)    Other orders  -     chlorthalidone (HYGROTON) 25 MG tablet; Take 1 tablet by mouth Daily.    Plan    A1c is pending, continue metformin and efforts at weight loss and healthy diet.    Blood pressure is now well controlled on chlorthalidone and losartan.    Lipid panel is pending, continue atorvastatin and efforts at weight loss.    BMI is 40.4.  Once again we discussed the importance of caloric restriction, reduction of simple carbohydrates and regular exercise to lose weight.    Her depression is in remission on Lexapro.      Plan of care reviewed with patient at the conclusion of today's visit. Education was provided regarding diagnosis, management, and any prescribed or recommended OTC medications.Patient verbalizes understanding of and agreement with management plan.         Domingo Sood MD           Answers for HPI/ROS " submitted by the patient on 5/11/2020   Hypertension  What is the primary reason for your visit?: High Blood Pressure  Chronicity: recurrent  Onset: more than 1 year ago  Progression since onset: rapidly improving  Condition status: controlled  anxiety: No  headaches: Yes  malaise/fatigue: Yes  orthopnea: No  peripheral edema: No  PND: No  sweats: No  Agents associated with hypertension: no associated agents  CAD risks: diabetes mellitus, dyslipidemia, family history, obesity  Compliance problems: exercise

## 2020-05-13 DIAGNOSIS — I10 BENIGN ESSENTIAL HYPERTENSION: Primary | ICD-10-CM

## 2020-05-21 ENCOUNTER — LAB (OUTPATIENT)
Dept: LAB | Facility: HOSPITAL | Age: 65
End: 2020-05-21

## 2020-05-21 DIAGNOSIS — I10 BENIGN ESSENTIAL HYPERTENSION: ICD-10-CM

## 2020-05-21 PROCEDURE — 80048 BASIC METABOLIC PNL TOTAL CA: CPT

## 2020-05-22 LAB
ANION GAP SERPL CALCULATED.3IONS-SCNC: 11.6 MMOL/L (ref 5–15)
BUN BLD-MCNC: 20 MG/DL (ref 8–23)
BUN/CREAT SERPL: 16 (ref 7–25)
CALCIUM SPEC-SCNC: 9.5 MG/DL (ref 8.6–10.5)
CHLORIDE SERPL-SCNC: 98 MMOL/L (ref 98–107)
CO2 SERPL-SCNC: 26.4 MMOL/L (ref 22–29)
CREAT BLD-MCNC: 1.25 MG/DL (ref 0.57–1)
GFR SERPL CREATININE-BSD FRML MDRD: 43 ML/MIN/1.73
GLUCOSE BLD-MCNC: 180 MG/DL (ref 65–99)
POTASSIUM BLD-SCNC: 4.2 MMOL/L (ref 3.5–5.2)
SODIUM BLD-SCNC: 136 MMOL/L (ref 136–145)

## 2020-06-24 RX ORDER — ATORVASTATIN CALCIUM 40 MG/1
TABLET, FILM COATED ORAL
Qty: 90 TABLET | Refills: 1 | Status: SHIPPED | OUTPATIENT
Start: 2020-06-24 | End: 2020-12-28

## 2020-07-13 RX ORDER — LOSARTAN POTASSIUM 50 MG/1
50 TABLET ORAL 2 TIMES DAILY
Qty: 60 TABLET | Refills: 5 | Status: SHIPPED | OUTPATIENT
Start: 2020-07-13 | End: 2020-11-02

## 2020-08-19 RX ORDER — PANTOPRAZOLE SODIUM 40 MG/1
TABLET, DELAYED RELEASE ORAL
Qty: 180 TABLET | Refills: 1 | Status: SHIPPED | OUTPATIENT
Start: 2020-08-19 | End: 2021-02-15

## 2020-08-31 RX ORDER — ESCITALOPRAM OXALATE 10 MG/1
TABLET ORAL
Qty: 90 TABLET | Refills: 1 | Status: SHIPPED | OUTPATIENT
Start: 2020-08-31 | End: 2021-03-04

## 2020-09-10 ENCOUNTER — TRANSCRIBE ORDERS (OUTPATIENT)
Dept: ADMINISTRATIVE | Facility: HOSPITAL | Age: 65
End: 2020-09-10

## 2020-09-10 DIAGNOSIS — K43.2 INCISIONAL HERNIA, WITHOUT OBSTRUCTION OR GANGRENE: Primary | ICD-10-CM

## 2020-09-16 ENCOUNTER — HOSPITAL ENCOUNTER (OUTPATIENT)
Dept: CT IMAGING | Facility: HOSPITAL | Age: 65
Discharge: HOME | End: 2020-09-16
Admitting: SURGERY

## 2020-09-16 DIAGNOSIS — K43.2 INCISIONAL HERNIA, WITHOUT OBSTRUCTION OR GANGRENE: ICD-10-CM

## 2020-09-16 PROCEDURE — 74176 CT ABD & PELVIS W/O CONTRAST: CPT

## 2020-10-16 ENCOUNTER — APPOINTMENT (OUTPATIENT)
Dept: PREADMISSION TESTING | Facility: HOSPITAL | Age: 65
End: 2020-10-16

## 2020-10-16 VITALS — BODY MASS INDEX: 40.35 KG/M2 | WEIGHT: 242.2 LBS | HEIGHT: 65 IN

## 2020-10-16 LAB
ALBUMIN SERPL-MCNC: 4.4 G/DL (ref 3.5–5.2)
ALBUMIN/GLOB SERPL: 1.7 G/DL
ALP SERPL-CCNC: 124 U/L (ref 39–117)
ALT SERPL W P-5'-P-CCNC: 51 U/L (ref 1–33)
ANION GAP SERPL CALCULATED.3IONS-SCNC: 13 MMOL/L (ref 5–15)
AST SERPL-CCNC: 41 U/L (ref 1–32)
BILIRUB SERPL-MCNC: 0.5 MG/DL (ref 0–1.2)
BUN SERPL-MCNC: 26 MG/DL (ref 8–23)
BUN/CREAT SERPL: 20.8 (ref 7–25)
CALCIUM SPEC-SCNC: 9.8 MG/DL (ref 8.6–10.5)
CHLORIDE SERPL-SCNC: 99 MMOL/L (ref 98–107)
CO2 SERPL-SCNC: 25 MMOL/L (ref 22–29)
CREAT SERPL-MCNC: 1.25 MG/DL (ref 0.57–1)
DEPRECATED RDW RBC AUTO: 43.9 FL (ref 37–54)
ERYTHROCYTE [DISTWIDTH] IN BLOOD BY AUTOMATED COUNT: 12.8 % (ref 12.3–15.4)
GFR SERPL CREATININE-BSD FRML MDRD: 43 ML/MIN/1.73
GLOBULIN UR ELPH-MCNC: 2.6 GM/DL
GLUCOSE SERPL-MCNC: 174 MG/DL (ref 65–99)
HCT VFR BLD AUTO: 38.9 % (ref 34–46.6)
HGB BLD-MCNC: 12.7 G/DL (ref 12–15.9)
MCH RBC QN AUTO: 30.7 PG (ref 26.6–33)
MCHC RBC AUTO-ENTMCNC: 32.6 G/DL (ref 31.5–35.7)
MCV RBC AUTO: 94 FL (ref 79–97)
PLATELET # BLD AUTO: 396 10*3/MM3 (ref 140–450)
PMV BLD AUTO: 9.2 FL (ref 6–12)
POTASSIUM SERPL-SCNC: 4.3 MMOL/L (ref 3.5–5.2)
PROT SERPL-MCNC: 7 G/DL (ref 6–8.5)
RBC # BLD AUTO: 4.14 10*6/MM3 (ref 3.77–5.28)
SARS-COV-2 RNA RESP QL NAA+PROBE: NOT DETECTED
SODIUM SERPL-SCNC: 137 MMOL/L (ref 136–145)
WBC # BLD AUTO: 8.9 10*3/MM3 (ref 3.4–10.8)

## 2020-10-16 PROCEDURE — 93010 ELECTROCARDIOGRAM REPORT: CPT | Performed by: INTERNAL MEDICINE

## 2020-10-16 PROCEDURE — U0004 COV-19 TEST NON-CDC HGH THRU: HCPCS

## 2020-10-16 PROCEDURE — C9803 HOPD COVID-19 SPEC COLLECT: HCPCS

## 2020-10-16 PROCEDURE — 85027 COMPLETE CBC AUTOMATED: CPT | Performed by: SURGERY

## 2020-10-16 PROCEDURE — 80053 COMPREHEN METABOLIC PANEL: CPT | Performed by: SURGERY

## 2020-10-16 PROCEDURE — 93005 ELECTROCARDIOGRAM TRACING: CPT

## 2020-10-16 PROCEDURE — 36415 COLL VENOUS BLD VENIPUNCTURE: CPT

## 2020-10-16 NOTE — PAT

## 2020-10-19 ENCOUNTER — ANESTHESIA EVENT (OUTPATIENT)
Dept: PERIOP | Facility: HOSPITAL | Age: 65
End: 2020-10-19

## 2020-10-19 ENCOUNTER — HOSPITAL ENCOUNTER (OUTPATIENT)
Facility: HOSPITAL | Age: 65
Setting detail: SURGERY ADMIT
Discharge: HOME | End: 2020-10-19
Attending: SURGERY | Admitting: SURGERY

## 2020-10-19 ENCOUNTER — ANESTHESIA (OUTPATIENT)
Dept: PERIOP | Facility: HOSPITAL | Age: 65
End: 2020-10-19

## 2020-10-19 VITALS
BODY MASS INDEX: 40.35 KG/M2 | DIASTOLIC BLOOD PRESSURE: 74 MMHG | SYSTOLIC BLOOD PRESSURE: 110 MMHG | WEIGHT: 242.2 LBS | TEMPERATURE: 98 F | OXYGEN SATURATION: 94 % | HEIGHT: 65 IN | HEART RATE: 70 BPM | RESPIRATION RATE: 18 BRPM

## 2020-10-19 DIAGNOSIS — K43.2 INCISIONAL HERNIA, WITHOUT OBSTRUCTION OR GANGRENE: Primary | ICD-10-CM

## 2020-10-19 LAB — GLUCOSE BLDC GLUCOMTR-MCNC: 180 MG/DL (ref 70–130)

## 2020-10-19 PROCEDURE — 25010000002 DEXAMETHASONE PER 1 MG: Performed by: NURSE ANESTHETIST, CERTIFIED REGISTERED

## 2020-10-19 PROCEDURE — 25010000002 FENTANYL CITRATE (PF) 100 MCG/2ML SOLUTION: Performed by: NURSE ANESTHETIST, CERTIFIED REGISTERED

## 2020-10-19 PROCEDURE — 25010000002 ONDANSETRON PER 1 MG: Performed by: NURSE ANESTHETIST, CERTIFIED REGISTERED

## 2020-10-19 PROCEDURE — C1781 MESH (IMPLANTABLE): HCPCS | Performed by: SURGERY

## 2020-10-19 PROCEDURE — 25010000002 NEOSTIGMINE 10 MG/10ML SOLUTION: Performed by: NURSE ANESTHETIST, CERTIFIED REGISTERED

## 2020-10-19 PROCEDURE — 25010000002 PROPOFOL 10 MG/ML EMULSION: Performed by: NURSE ANESTHETIST, CERTIFIED REGISTERED

## 2020-10-19 PROCEDURE — 25010000002 DEXAMETHASONE SODIUM PHOSPHATE 10 MG/ML SOLUTION: Performed by: ANESTHESIOLOGY

## 2020-10-19 PROCEDURE — 25010000002 VANCOMYCIN 10 G RECONSTITUTED SOLUTION: Performed by: SURGERY

## 2020-10-19 PROCEDURE — 82962 GLUCOSE BLOOD TEST: CPT

## 2020-10-19 DEVICE — VENTRALEX ST HERNIA PATCH
Type: IMPLANTABLE DEVICE | Site: ABDOMEN | Status: FUNCTIONAL
Brand: VENTRALEX ST HERNIA PATCH

## 2020-10-19 RX ORDER — CEFAZOLIN SODIUM 2 G/100ML
2 INJECTION, SOLUTION INTRAVENOUS ONCE
Status: DISCONTINUED | OUTPATIENT
Start: 2020-10-19 | End: 2020-10-19

## 2020-10-19 RX ORDER — MAGNESIUM HYDROXIDE 1200 MG/15ML
LIQUID ORAL AS NEEDED
Status: DISCONTINUED | OUTPATIENT
Start: 2020-10-19 | End: 2020-10-19 | Stop reason: HOSPADM

## 2020-10-19 RX ORDER — ONDANSETRON 2 MG/ML
4 INJECTION INTRAMUSCULAR; INTRAVENOUS ONCE AS NEEDED
Status: DISCONTINUED | OUTPATIENT
Start: 2020-10-19 | End: 2020-10-23 | Stop reason: HOSPADM

## 2020-10-19 RX ORDER — DEXAMETHASONE SODIUM PHOSPHATE 4 MG/ML
INJECTION, SOLUTION INTRA-ARTICULAR; INTRALESIONAL; INTRAMUSCULAR; INTRAVENOUS; SOFT TISSUE AS NEEDED
Status: DISCONTINUED | OUTPATIENT
Start: 2020-10-19 | End: 2020-10-19 | Stop reason: SURG

## 2020-10-19 RX ORDER — SODIUM CHLORIDE 0.9 % (FLUSH) 0.9 %
10 SYRINGE (ML) INJECTION EVERY 12 HOURS SCHEDULED
Status: DISCONTINUED | OUTPATIENT
Start: 2020-10-19 | End: 2020-10-19 | Stop reason: HOSPADM

## 2020-10-19 RX ORDER — FENTANYL CITRATE 50 UG/ML
INJECTION, SOLUTION INTRAMUSCULAR; INTRAVENOUS AS NEEDED
Status: DISCONTINUED | OUTPATIENT
Start: 2020-10-19 | End: 2020-10-19 | Stop reason: SURG

## 2020-10-19 RX ORDER — FAMOTIDINE 20 MG/1
20 TABLET, FILM COATED ORAL ONCE
Status: COMPLETED | OUTPATIENT
Start: 2020-10-19 | End: 2020-10-19

## 2020-10-19 RX ORDER — HYDROCODONE BITARTRATE AND ACETAMINOPHEN 5; 325 MG/1; MG/1
1 TABLET ORAL EVERY 6 HOURS PRN
Qty: 15 TABLET | Refills: 0 | Status: SHIPPED | OUTPATIENT
Start: 2020-10-19 | End: 2020-11-13

## 2020-10-19 RX ORDER — LABETALOL HYDROCHLORIDE 5 MG/ML
5 INJECTION, SOLUTION INTRAVENOUS
Status: DISCONTINUED | OUTPATIENT
Start: 2020-10-19 | End: 2020-10-23 | Stop reason: HOSPADM

## 2020-10-19 RX ORDER — ONDANSETRON 2 MG/ML
INJECTION INTRAMUSCULAR; INTRAVENOUS AS NEEDED
Status: DISCONTINUED | OUTPATIENT
Start: 2020-10-19 | End: 2020-10-19 | Stop reason: SURG

## 2020-10-19 RX ORDER — SODIUM CHLORIDE 0.9 % (FLUSH) 0.9 %
10 SYRINGE (ML) INJECTION AS NEEDED
Status: DISCONTINUED | OUTPATIENT
Start: 2020-10-19 | End: 2020-10-19 | Stop reason: HOSPADM

## 2020-10-19 RX ORDER — HYDRALAZINE HYDROCHLORIDE 20 MG/ML
5 INJECTION INTRAMUSCULAR; INTRAVENOUS
Status: DISCONTINUED | OUTPATIENT
Start: 2020-10-19 | End: 2020-10-23 | Stop reason: HOSPADM

## 2020-10-19 RX ORDER — ROCURONIUM BROMIDE 10 MG/ML
INJECTION, SOLUTION INTRAVENOUS AS NEEDED
Status: DISCONTINUED | OUTPATIENT
Start: 2020-10-19 | End: 2020-10-19 | Stop reason: SURG

## 2020-10-19 RX ORDER — HYDROMORPHONE HYDROCHLORIDE 1 MG/ML
0.5 INJECTION, SOLUTION INTRAMUSCULAR; INTRAVENOUS; SUBCUTANEOUS
Status: DISCONTINUED | OUTPATIENT
Start: 2020-10-19 | End: 2020-10-23 | Stop reason: HOSPADM

## 2020-10-19 RX ORDER — LIDOCAINE HYDROCHLORIDE 10 MG/ML
INJECTION, SOLUTION EPIDURAL; INFILTRATION; INTRACAUDAL; PERINEURAL AS NEEDED
Status: DISCONTINUED | OUTPATIENT
Start: 2020-10-19 | End: 2020-10-19 | Stop reason: SURG

## 2020-10-19 RX ORDER — LIDOCAINE HYDROCHLORIDE 10 MG/ML
0.5 INJECTION, SOLUTION EPIDURAL; INFILTRATION; INTRACAUDAL; PERINEURAL ONCE AS NEEDED
Status: COMPLETED | OUTPATIENT
Start: 2020-10-19 | End: 2020-10-19

## 2020-10-19 RX ORDER — NEOSTIGMINE METHYLSULFATE 1 MG/ML
INJECTION, SOLUTION INTRAVENOUS AS NEEDED
Status: DISCONTINUED | OUTPATIENT
Start: 2020-10-19 | End: 2020-10-19 | Stop reason: SURG

## 2020-10-19 RX ORDER — HYDROCODONE BITARTRATE AND ACETAMINOPHEN 5; 325 MG/1; MG/1
1 TABLET ORAL ONCE AS NEEDED
Status: DISCONTINUED | OUTPATIENT
Start: 2020-10-19 | End: 2020-10-23 | Stop reason: HOSPADM

## 2020-10-19 RX ORDER — GLYCOPYRROLATE 0.2 MG/ML
INJECTION INTRAMUSCULAR; INTRAVENOUS AS NEEDED
Status: DISCONTINUED | OUTPATIENT
Start: 2020-10-19 | End: 2020-10-19 | Stop reason: SURG

## 2020-10-19 RX ORDER — FAMOTIDINE 10 MG/ML
20 INJECTION, SOLUTION INTRAVENOUS ONCE
Status: CANCELLED | OUTPATIENT
Start: 2020-10-19 | End: 2020-10-19

## 2020-10-19 RX ORDER — SODIUM CHLORIDE 0.9 % (FLUSH) 0.9 %
3-10 SYRINGE (ML) INJECTION AS NEEDED
Status: DISCONTINUED | OUTPATIENT
Start: 2020-10-19 | End: 2020-10-23 | Stop reason: HOSPADM

## 2020-10-19 RX ORDER — FENTANYL CITRATE 50 UG/ML
50 INJECTION, SOLUTION INTRAMUSCULAR; INTRAVENOUS
Status: DISCONTINUED | OUTPATIENT
Start: 2020-10-19 | End: 2020-10-23 | Stop reason: HOSPADM

## 2020-10-19 RX ORDER — NALOXONE HCL 0.4 MG/ML
0.4 VIAL (ML) INJECTION AS NEEDED
Status: DISCONTINUED | OUTPATIENT
Start: 2020-10-19 | End: 2020-10-23 | Stop reason: HOSPADM

## 2020-10-19 RX ORDER — SODIUM CHLORIDE 0.9 % (FLUSH) 0.9 %
3 SYRINGE (ML) INJECTION EVERY 12 HOURS SCHEDULED
Status: DISCONTINUED | OUTPATIENT
Start: 2020-10-19 | End: 2020-10-23 | Stop reason: HOSPADM

## 2020-10-19 RX ORDER — IPRATROPIUM BROMIDE AND ALBUTEROL SULFATE 2.5; .5 MG/3ML; MG/3ML
3 SOLUTION RESPIRATORY (INHALATION) ONCE AS NEEDED
Status: DISCONTINUED | OUTPATIENT
Start: 2020-10-19 | End: 2020-10-23 | Stop reason: HOSPADM

## 2020-10-19 RX ORDER — BUPIVACAINE HYDROCHLORIDE 2.5 MG/ML
INJECTION, SOLUTION EPIDURAL; INFILTRATION; INTRACAUDAL
Status: COMPLETED | OUTPATIENT
Start: 2020-10-19 | End: 2020-10-19

## 2020-10-19 RX ORDER — SODIUM CHLORIDE, SODIUM LACTATE, POTASSIUM CHLORIDE, CALCIUM CHLORIDE 600; 310; 30; 20 MG/100ML; MG/100ML; MG/100ML; MG/100ML
INJECTION, SOLUTION INTRAVENOUS CONTINUOUS PRN
Status: DISCONTINUED | OUTPATIENT
Start: 2020-10-19 | End: 2020-10-19 | Stop reason: SURG

## 2020-10-19 RX ORDER — SODIUM CHLORIDE, SODIUM LACTATE, POTASSIUM CHLORIDE, CALCIUM CHLORIDE 600; 310; 30; 20 MG/100ML; MG/100ML; MG/100ML; MG/100ML
9 INJECTION, SOLUTION INTRAVENOUS CONTINUOUS
Status: DISCONTINUED | OUTPATIENT
Start: 2020-10-19 | End: 2020-10-23 | Stop reason: HOSPADM

## 2020-10-19 RX ORDER — DEXAMETHASONE SODIUM PHOSPHATE 10 MG/ML
INJECTION, SOLUTION INTRAMUSCULAR; INTRAVENOUS
Status: COMPLETED | OUTPATIENT
Start: 2020-10-19 | End: 2020-10-19

## 2020-10-19 RX ORDER — PROPOFOL 10 MG/ML
VIAL (ML) INTRAVENOUS AS NEEDED
Status: DISCONTINUED | OUTPATIENT
Start: 2020-10-19 | End: 2020-10-19 | Stop reason: SURG

## 2020-10-19 RX ADMIN — LIDOCAINE HYDROCHLORIDE 50 MG: 10 INJECTION, SOLUTION EPIDURAL; INFILTRATION; INTRACAUDAL; PERINEURAL at 09:54

## 2020-10-19 RX ADMIN — FENTANYL CITRATE 50 MCG: 0.05 INJECTION, SOLUTION INTRAMUSCULAR; INTRAVENOUS at 11:50

## 2020-10-19 RX ADMIN — DEXAMETHASONE SODIUM PHOSPHATE 4 MG: 10 INJECTION INTRAMUSCULAR; INTRAVENOUS at 10:00

## 2020-10-19 RX ADMIN — DEXAMETHASONE SODIUM PHOSPHATE 4 MG: 4 INJECTION, SOLUTION INTRAMUSCULAR; INTRAVENOUS at 10:08

## 2020-10-19 RX ADMIN — SODIUM CHLORIDE, POTASSIUM CHLORIDE, SODIUM LACTATE AND CALCIUM CHLORIDE 9 ML/HR: 600; 310; 30; 20 INJECTION, SOLUTION INTRAVENOUS at 08:08

## 2020-10-19 RX ADMIN — FENTANYL CITRATE 50 MCG: 50 INJECTION, SOLUTION INTRAMUSCULAR; INTRAVENOUS at 09:54

## 2020-10-19 RX ADMIN — HYDROCODONE BITARTRATE AND ACETAMINOPHEN 1 TABLET: 5; 325 TABLET ORAL at 12:10

## 2020-10-19 RX ADMIN — PROPOFOL 200 MG: 10 INJECTION, EMULSION INTRAVENOUS at 09:54

## 2020-10-19 RX ADMIN — ONDANSETRON 4 MG: 2 INJECTION INTRAMUSCULAR; INTRAVENOUS at 10:45

## 2020-10-19 RX ADMIN — GLYCOPYRROLATE 0.4 MG: 0.2 INJECTION INTRAMUSCULAR; INTRAVENOUS at 10:48

## 2020-10-19 RX ADMIN — FAMOTIDINE 20 MG: 20 TABLET ORAL at 08:08

## 2020-10-19 RX ADMIN — FENTANYL CITRATE 50 MCG: 0.05 INJECTION, SOLUTION INTRAMUSCULAR; INTRAVENOUS at 11:15

## 2020-10-19 RX ADMIN — SODIUM CHLORIDE, POTASSIUM CHLORIDE, SODIUM LACTATE AND CALCIUM CHLORIDE: 600; 310; 30; 20 INJECTION, SOLUTION INTRAVENOUS at 09:50

## 2020-10-19 RX ADMIN — NEOSTIGMINE 3 MG: 1 INJECTION INTRAVENOUS at 10:48

## 2020-10-19 RX ADMIN — ROCURONIUM BROMIDE 50 MG: 10 INJECTION INTRAVENOUS at 09:54

## 2020-10-19 RX ADMIN — LIDOCAINE HYDROCHLORIDE 0.5 ML: 10 INJECTION, SOLUTION EPIDURAL; INFILTRATION; INTRACAUDAL; PERINEURAL at 08:09

## 2020-10-19 RX ADMIN — VANCOMYCIN HYDROCHLORIDE 1750 MG: 10 INJECTION, POWDER, LYOPHILIZED, FOR SOLUTION INTRAVENOUS at 09:12

## 2020-10-19 RX ADMIN — BUPIVACAINE HYDROCHLORIDE 60 ML: 2.5 INJECTION, SOLUTION EPIDURAL; INFILTRATION; INTRACAUDAL; PERINEURAL at 10:00

## 2020-10-19 RX ADMIN — FENTANYL CITRATE 50 MCG: 0.05 INJECTION, SOLUTION INTRAMUSCULAR; INTRAVENOUS at 11:25

## 2020-10-19 NOTE — H&P
Patient Care Team:      Chief complaint  Abdominal hernia    Subjective:    Patient is a 65 y.o.female presents with a three month history of abdominal hernia.  She previously had lap faith.  The hernia is gradually getting larger and more painful.     Review of Systems:  General ROS: negative  Cardiovascular ROS: no chest pain or dyspnea on exertion  Respiratory ROS: no cough, shortness of breath, or wheezing      Allergies: No Known Allergies       Latex: neg  Contrast Dye neg    Home Meds    Medications Prior to Admission   Medication Sig Dispense Refill Last Dose   • acetaminophen (TYLENOL) 325 MG tablet Take 2 tablets by mouth Every 6 (Six) Hours As Needed for Mild Pain  or Moderate Pain  (Do not exceed 4,000mg in 24 hours).   Past Week at Unknown time   • Ascorbic Acid (VITAMIN C) 500 MG capsule Take 2 capsules by mouth Daily.   10/19/2020 at 0600   • atorvastatin (LIPITOR) 40 MG tablet TAKE 1 TABLET BY MOUTH EVERY DAY (Patient taking differently: Take 40 mg by mouth Daily.) 90 tablet 1 10/18/2020 at Unknown time   • chlorthalidone (HYGROTON) 25 MG tablet Take 1 tablet by mouth Daily. 90 tablet 1 10/18/2020 at 0800   • cholecalciferol (VITAMIN D3) 1000 units tablet Take 2 tablets by mouth Every Morning.   10/19/2020 at 0600   • dextromethorphan-guaifenesin (MUCINEX DM MAX STRENGTH)  MG per 12 hr tablet Take  by mouth As Needed.   Past Week at Unknown time   • escitalopram (LEXAPRO) 10 MG tablet TAKE 1 TABLET BY MOUTH EVERY DAY (Patient taking differently: Take 10 mg by mouth Daily.) 90 tablet 1 10/19/2020 at 0600   • losartan (COZAAR) 50 MG tablet Take 1 tablet by mouth 2 (Two) Times a Day. (Patient taking differently: Take 100 mg by mouth Daily.) 60 tablet 5 10/19/2020 at 0600   • metFORMIN (GLUCOPHAGE) 1000 MG tablet Take 1 tablet by mouth 2 (Two) Times a Day With Meals. 180 tablet 3 10/18/2020 at 2000   • Multiple Vitamins-Minerals (MULTIVITAMIN ADULT PO) Take 1 tablet by mouth Daily.   10/19/2020  at 0600   • pantoprazole (PROTONIX) 40 MG EC tablet TAKE 1 TABLET BY MOUTH TWICE A DAY (Patient taking differently: Take 40 mg by mouth Daily.) 180 tablet 1 10/19/2020 at 0600   • ALBUTEROL SULFATE  (90 Base) MCG/ACT inhaler INHALE 2 PUFFS EVERY 4 (FOUR) HOURS AS NEEDED FOR WHEEZING OR SHORTNESS OF AIR. 18 inhaler 3 More than a month at Unknown time   • loratadine (CLARITIN) 10 MG tablet Take 1 tablet by mouth Daily.        PMH:   Past Medical History:   Diagnosis Date   • Cancer of breast (CMS/HCC) 2014   • Chest pain 2009    stress nuclear   • Cholelithiasis 2019    Surgery   • Diabetes mellitus (CMS/HCC)     TYPE 2   • Diarrhea     SINCE GALLBLADDER REMOVAL   • Hernia of abdominal cavity    • Hypertension    • Kidney disorder     FOLLOWING BREAST CANCER AND SURGERY HAD TO STOP MEDICATIONS SUCH AS IBUPROFEN;  CREATININE WAS ELEVATED.  NO PROBLEMS AT THIS TIME, BUT AVOIDS IBUPROFEN.   • MRSA (methicillin resistant Staphylococcus aureus)     infection of right breast expander; bilateral removal of expanders   • Schwannoma 06/2016    left translabyrinthine craniotomy   • Wears glasses     RX   • Wears hearing aid     BILATERAL     PSH:    Past Surgical History:   Procedure Laterality Date   • BREAST RECONSTRUCTION, BREAST TISSUE EXPANDER INSERTION Bilateral 07/2013   • BREAST SURGERY  2014   • BREAST TISSUE EXPANDER REMOVAL INSERTION OF IMPLANT Bilateral     bilateral removal of expanders   • CHOLECYSTECTOMY N/A 12/1/2019    Procedure: CHOLECYSTECTOMY LAPAROSCOPIC;  Surgeon: Rafiq Angelo MD;  Location: [facility];  Service: General   • COLONOSCOPY  2018   • CRANIOTOMY FOR TUMOR  06/2016    Left Translabyrithine craniotomy   • HYSTERECTOMY  1986   • MASTECTOMY Bilateral 2014    complicated by MRSA wound infection on right requiring removal of expander.   • TONSILLECTOMY       Immunization History: pneumo up to date   Flu  2020  Tetanus  ?  Social History:   Tobacco neg   Alcohol neg      Physical  "Exam:/75 (BP Location: Right arm, Patient Position: Lying)   Pulse 84   Temp 97.9 °F (36.6 °C) (Temporal)   Resp 18   Ht 165.1 cm (65\")   Wt 110 kg (242 lb 3.2 oz)   SpO2 93%   BMI 40.30 kg/m²       General Appearance:    Alert, cooperative, no distress, appears stated age   Head:    Normocephalic, without obvious abnormality, atraumatic   Lungs:     Clear to auscultation bilaterally, respirations unlabored    Heart: Regular rate and rhythm, S1 and S2 normal, no murmur, rub    or gallop    Abdomen:    Soft without tenderness   Breast Exam:    deferred   Genitalia:    deferred   Extremities:   Extremities normal, atraumatic, no cyanosis or edema   Skin:   Skin color, texture, turgor normal, no rashes or lesions   Neurologic:   Grossly intact     Results Review:   LABS:  Lab Results   Component Value Date    WBC 8.90 10/16/2020    HGB 12.7 10/16/2020    HCT 38.9 10/16/2020    MCV 94.0 10/16/2020     10/16/2020    NEUTROABS 5.97 01/08/2020    GLUCOSE 174 (H) 10/16/2020    BUN 26 (H) 10/16/2020    CREATININE 1.25 (H) 10/16/2020    EGFRIFNONA 43 (L) 10/16/2020     10/16/2020    K 4.3 10/16/2020    CL 99 10/16/2020    CO2 25.0 10/16/2020    CALCIUM 9.8 10/16/2020    ALBUMIN 4.40 10/16/2020    AST 41 (H) 10/16/2020    ALT 51 (H) 10/16/2020    BILITOT 0.5 10/16/2020       RADIOLOGY:  Imaging Results (Last 72 Hours)     ** No results found for the last 72 hours. **               Cancer Patient: __ yes __no __unknown; If yes, clinical stage T:__ N:__M:__, stage group    Impression: incisional hernia    Plan: open incisional hernia repair with mesh  Nikole Thomson PA-C 10/19/2020 08:44 EDT        "

## 2020-10-19 NOTE — ANESTHESIA POSTPROCEDURE EVALUATION
Patient: Wallace Dukes    Procedure Summary     Date: 10/19/20 Room / Location:  [facility]    Anesthesia Start: 0950 Anesthesia Stop:     Procedure: OPEN INCISIONAL HERNIA REPAIR WITH MESH (N/A Abdomen) Diagnosis:     Surgeon: Rafiq Angelo MD Provider: Kevin Herrera MD    Anesthesia Type: general with block ASA Status: 3          Anesthesia Type: general with block    Vitals  Vitals Value Taken Time   BP     Temp     Pulse 84 10/19/20 1104   Resp     SpO2 100 % 10/19/20 1102   Vitals shown include unvalidated device data.        Post Anesthesia Care and Evaluation    Patient location during evaluation: PACU  Patient participation: complete - patient participated  Level of consciousness: awake and alert  Pain score: 0  Pain management: adequate  Airway patency: patent  Anesthetic complications: No anesthetic complications  PONV Status: none  Cardiovascular status: hemodynamically stable and acceptable  Respiratory status: nonlabored ventilation, acceptable and nasal cannula  Hydration status: acceptable    Comments: Patient transported to PACU on 10L nonrebreather secondary to shallow breathing despite awake and frequent coughing. VSS in PACU. RN at bedside. SpO2 100%, /70, T 97.8, RR 8, HR 83.

## 2020-10-19 NOTE — ANESTHESIA POSTPROCEDURE EVALUATION
Patient: Wallace Dukes    Procedure Summary     Date: 10/19/20 Room / Location:  [facility]    Anesthesia Start: 0950 Anesthesia Stop: 1107    Procedure: OPEN INCISIONAL HERNIA REPAIR WITH MESH (N/A Abdomen) Diagnosis:     Surgeon: Rafiq Angelo MD Provider: Kevin Herrera MD    Anesthesia Type: general with block ASA Status: 3          Anesthesia Type: general with block    Vitals  Vitals Value Taken Time   /66 10/19/20 1150   Temp 98.6 °F (37 °C) 10/19/20 1130   Pulse 73 10/19/20 1158   Resp 18 10/19/20 1130   SpO2 86 % 10/19/20 1158   Vitals shown include unvalidated device data.        Anesthesia Post Evaluation

## 2020-10-19 NOTE — ANESTHESIA PROCEDURE NOTES
Airway  Urgency: elective    Date/Time: 10/19/2020 10:02 AM  Airway not difficult    General Information and Staff    Patient location during procedure: OR  CRNA: Rosa Hicks CRNA    Indications and Patient Condition  Indications for airway management: airway protection    Preoxygenated: yes  MILS not maintained throughout  Mask difficulty assessment: 2 - vent by mask + OA or adjuvant +/- NMBA    Final Airway Details  Final airway type: endotracheal airway      Successful airway: ETT  Cuffed: yes   Successful intubation technique: direct laryngoscopy  Endotracheal tube insertion site: oral  Blade: [place]  Blade size: 3  ETT size (mm): 7.5  Cormack-Lehane Classification: grade IIa - partial view of glottis  Placement verified by: chest auscultation and capnometry   Measured from: lips  ETT/EBT  to lips (cm): 21  Number of attempts at approach: 1  Assessment: lips, teeth, and gum same as pre-op and atraumatic intubation    Additional Comments  Negative epigastric sounds, Breath sound equal bilaterally with symmetric chest rise and fall

## 2020-10-19 NOTE — OP NOTE
General Surgery Operative Note    Wallace Dukes  [id number]  1955    Date of Surgery:  10/19/2020 10:55 EDT    Pre-op Diagnosis: Incisional hernia    Post-op Diagnosis: Incisional hernia    Procedure: Open incisional herniorrhaphy            Placement of mesh, VentraLex ST 8 cm [place], intraperitoneal    Surgeon: Rafiq Angelo MD    Circulator: Sunil Gil RN; Hal Scott, NOLVIA; Radha Fuchs RN  Scrub Person: Sukhjinder Coronado  Nursing Assistant: Eddy Ivey     Anesthesia: General with bilateral tap blocks    Fluids: 700 mL of crystalloid    Estimated Blood Loss: Less than 25 mL    Complications: None apparent    History:   65-year-old lady status post laparoscopic cholecystectomy developed a incisional hernia at the midline incision.       The risks and benefits of incisional herniorrhaphy with mesh were rehashed.  Our discussion included but was not limited to: bleeding, infection, injury to adjacent viscera (colon, small bowel etc.), mesh complications (fistula formation etc.), chronic pain, need for reintervention, an open operation in general, and medical issues from a cardiopulmonary and deep venous thrombosis standpoint.  All questions were answered and they understand and wish to proceed with surgical intervention.    Procedure:      After informed consent, the patient was taken to the operating room and placed in the supine position.  General anesthesia was induced, bilateral TAP blocks were placed by anesthesia, the abdomen was then prepped and draped in the standard sterile fashion. An ioban was placed on the skin. A timeout was observed.     A transverse incision directly through her prior incision was created in the standard fashion.  I dissected down to the abdominal wall fascia with the electrocautery Bovie encountering the hernia sac.  The hernia sac was opened and dissected circumferentially down to the level of the fascia and amputated.  The hernia itself contained  omental fat.  This was reduced into the abdomen.  An 8 cm VentraLex ST Prolene patch was placed in an intraperitoneal position and tacked to the anterior abdominal wall circumferentially with 0 Vicryl.  Meticulous hemostasis was obtained.  The abdominal wall fascia was closed with interrupted 0 Ethibond suture.  The subcutaneous elements were reapproximated closing the potential space with interrupted 3-0 Vicryl.  The skin was run with a 4-0 subcuticular Monocryl.   Mastisol, Steri-Strips, Telfa, and a Tegaderm were placed on the skin.  An abdominal binder was placed around the patient's abdomen.     Sterile dressings were placed on the wound.  All lap and needle counts were reported as correct at the end of the procedure ×2.  The patient was then transferred to the PACU.    Rafiq Angelo MD     Date: 10/19/2020  Time: 10:55 EDT

## 2020-10-19 NOTE — ANESTHESIA PREPROCEDURE EVALUATION
Anesthesia Evaluation     Patient summary reviewed and Nursing notes reviewed   NPO Solid Status: > 8 hours  NPO Liquid Status: < 2 hours           Airway   Mallampati: III  TM distance: >3 FB  Neck ROM: limited  Possible difficult intubation  Dental      Pulmonary    (+) asthma (mild PRN MDI -none recently ),  (-) pneumonia, shortness of breath, recent URI, sleep apnea, not a smoker    ROS comment: 94% RA  Cardiovascular     (+) hypertension, hyperlipidemia,   (-) past MI, dysrhythmias, angina, cardiac stents      Neuro/Psych  (+) CVA, numbness (polyneuropathy DM related), psychiatric history Depression,     (-) seizures  GI/Hepatic/Renal/Endo    (+) morbid obesity, GERD,  renal disease, diabetes mellitus type 2,   (-)  obesity, liver disease, no thyroid disorder    Musculoskeletal     Abdominal    Substance History      OB/GYN          Other      history of cancer (sp mmastectomies bilat for R sided Ca with LN s on R ) remission    ROS/Med Hx Other: creat 1.2                 Anesthesia Plan    ASA 3     general with block   (TAPs   Glidescope Standby )  intravenous induction     Anesthetic plan, all risks, benefits, and alternatives have been provided, discussed and informed consent has been obtained with: patient.    Plan discussed with CRNA.

## 2020-10-19 NOTE — ANESTHESIA PROCEDURE NOTES
Peripheral Block    Pre-sedation assessment completed: 10/19/2020 9:58 AM    Patient reassessed immediately prior to procedure    Patient location during procedure: OR  Start time: 10/19/2020 9:58 AM  Stop time: 10/19/2020 10:02 AM  Reason for block: at surgeon's request and post-op pain management  Performed by  Anesthesiologist: Kevin Herrera MD  CRNA: Rosa Hicks CRNA  Preanesthetic Checklist  Completed: patient identified, site marked, surgical consent, pre-op evaluation, timeout performed, IV checked, risks and benefits discussed and monitors and equipment checked  Prep:  Pt Position: supine  Sterile barriers:cap, gloves, sterile barriers and mask  Prep: ChloraPrep  Patient monitoring: blood pressure monitoring, continuous pulse oximetry and EKG  Procedure  Sedation:yes  Performed under: general  Guidance:ultrasound guided  Images:still images obtained, printed/placed on chart    Anesthesia laterality: Four quadrant TAPS.  Block Type:TAP  Injection Technique:single-shot  Needle Type:short-bevel and echogenic  Needle Gauge:20 G  Resistance on Injection: none    Medications Used: bupivacaine PF (MARCAINE) 0.25 % injection, 60 mL  dexamethasone sodium phosphate injection, 4 mg  Med admintered at 10/19/2020 10:00 AM      Medications  Comment:Block Injection:  LA dose divided between Right and Left block four quadrant block        Post Assessment  Injection Assessment: negative aspiration for heme, incremental injection and no paresthesia on injection  Patient Tolerance:comfortable throughout block  Complications:no  Additional Notes      Under Ultrasound guidance, a BBraun 4inch 360 degree needle was advanced with Normal Saline hydro dissection of tissue.  The Internal Oblique and Transversus Abdominus muscles where visualized.  At or before the aponeurosis of Internal Oblique, local anesthetic spread was visualized in the Transversus Abdominus Plane. Injection was made incrementally with aspiration  every 5 mls.  There was no  intravascular injection,  injection pressure was normal, there was no neural injection, and the procedure was completed without difficulty.  Thank You.

## 2020-11-02 DIAGNOSIS — E78.2 MIXED HYPERLIPIDEMIA: ICD-10-CM

## 2020-11-02 DIAGNOSIS — I10 BENIGN ESSENTIAL HYPERTENSION: ICD-10-CM

## 2020-11-02 DIAGNOSIS — E11.42 DIABETIC POLYNEUROPATHY ASSOCIATED WITH TYPE 2 DIABETES MELLITUS (HCC): Primary | ICD-10-CM

## 2020-11-02 RX ORDER — CHLORTHALIDONE 25 MG/1
25 TABLET ORAL DAILY
Qty: 90 TABLET | Refills: 1 | Status: SHIPPED | OUTPATIENT
Start: 2020-11-02 | End: 2021-04-27

## 2020-11-02 RX ORDER — LOSARTAN POTASSIUM 100 MG/1
TABLET ORAL
Qty: 90 TABLET | Refills: 1 | Status: SHIPPED | OUTPATIENT
Start: 2020-11-02 | End: 2021-04-27

## 2020-11-02 NOTE — TELEPHONE ENCOUNTER
Pt is taking 100 mg qd - she said that strength had been on back order and that is why it was changed. It is now available.

## 2020-11-10 ENCOUNTER — LAB (OUTPATIENT)
Dept: LAB | Facility: HOSPITAL | Age: 65
End: 2020-11-10

## 2020-11-10 DIAGNOSIS — E78.2 MIXED HYPERLIPIDEMIA: ICD-10-CM

## 2020-11-10 DIAGNOSIS — E11.42 DIABETIC POLYNEUROPATHY ASSOCIATED WITH TYPE 2 DIABETES MELLITUS (HCC): ICD-10-CM

## 2020-11-10 PROCEDURE — 80053 COMPREHEN METABOLIC PANEL: CPT

## 2020-11-10 PROCEDURE — 83036 HEMOGLOBIN GLYCOSYLATED A1C: CPT

## 2020-11-10 PROCEDURE — 80061 LIPID PANEL: CPT

## 2020-11-11 LAB
ALBUMIN SERPL-MCNC: 4.2 G/DL (ref 3.5–5.2)
ALBUMIN/GLOB SERPL: 1.4 G/DL
ALP SERPL-CCNC: 117 U/L (ref 39–117)
ALT SERPL W P-5'-P-CCNC: 44 U/L (ref 1–33)
ANION GAP SERPL CALCULATED.3IONS-SCNC: 12.5 MMOL/L (ref 5–15)
AST SERPL-CCNC: 34 U/L (ref 1–32)
BILIRUB SERPL-MCNC: 0.5 MG/DL (ref 0–1.2)
BUN SERPL-MCNC: 20 MG/DL (ref 8–23)
BUN/CREAT SERPL: 14.9 (ref 7–25)
CALCIUM SPEC-SCNC: 9.6 MG/DL (ref 8.6–10.5)
CHLORIDE SERPL-SCNC: 98 MMOL/L (ref 98–107)
CHOLEST SERPL-MCNC: 204 MG/DL (ref 0–200)
CO2 SERPL-SCNC: 29.5 MMOL/L (ref 22–29)
CREAT SERPL-MCNC: 1.34 MG/DL (ref 0.57–1)
GFR SERPL CREATININE-BSD FRML MDRD: 40 ML/MIN/1.73
GLOBULIN UR ELPH-MCNC: 2.9 GM/DL
GLUCOSE SERPL-MCNC: 135 MG/DL (ref 65–99)
HBA1C MFR BLD: 7.64 % (ref 4.8–5.6)
HDLC SERPL-MCNC: 55 MG/DL (ref 40–60)
LDLC SERPL CALC-MCNC: 112 MG/DL (ref 0–100)
LDLC/HDLC SERPL: 1.93 {RATIO}
POTASSIUM SERPL-SCNC: 4.8 MMOL/L (ref 3.5–5.2)
PROT SERPL-MCNC: 7.1 G/DL (ref 6–8.5)
SODIUM SERPL-SCNC: 140 MMOL/L (ref 136–145)
TRIGL SERPL-MCNC: 215 MG/DL (ref 0–150)
VLDLC SERPL-MCNC: 37 MG/DL (ref 5–40)

## 2020-11-13 ENCOUNTER — OFFICE VISIT (OUTPATIENT)
Dept: INTERNAL MEDICINE | Facility: CLINIC | Age: 65
End: 2020-11-13

## 2020-11-13 VITALS
HEART RATE: 72 BPM | WEIGHT: 241.2 LBS | SYSTOLIC BLOOD PRESSURE: 122 MMHG | HEIGHT: 65 IN | BODY MASS INDEX: 40.19 KG/M2 | DIASTOLIC BLOOD PRESSURE: 74 MMHG | TEMPERATURE: 97.1 F

## 2020-11-13 DIAGNOSIS — E11.21 DIABETIC NEPHROPATHY ASSOCIATED WITH TYPE 2 DIABETES MELLITUS (HCC): ICD-10-CM

## 2020-11-13 DIAGNOSIS — E11.42 DIABETIC POLYNEUROPATHY ASSOCIATED WITH TYPE 2 DIABETES MELLITUS (HCC): ICD-10-CM

## 2020-11-13 DIAGNOSIS — N18.32 STAGE 3B CHRONIC KIDNEY DISEASE (HCC): ICD-10-CM

## 2020-11-13 DIAGNOSIS — F32.4 MAJOR DEPRESSIVE DISORDER WITH SINGLE EPISODE, IN PARTIAL REMISSION (HCC): ICD-10-CM

## 2020-11-13 DIAGNOSIS — K21.9 GASTROESOPHAGEAL REFLUX DISEASE WITHOUT ESOPHAGITIS: ICD-10-CM

## 2020-11-13 DIAGNOSIS — Z85.3 PERSONAL HISTORY OF BREAST CANCER: ICD-10-CM

## 2020-11-13 DIAGNOSIS — E66.01 MORBID OBESITY (HCC): ICD-10-CM

## 2020-11-13 DIAGNOSIS — I10 ESSENTIAL HYPERTENSION: ICD-10-CM

## 2020-11-13 DIAGNOSIS — Z00.00 PREVENTATIVE HEALTH CARE: Primary | ICD-10-CM

## 2020-11-13 LAB
POC CREATININE URINE: NORMAL
POC MICROALBUMIN URINE: NORMAL

## 2020-11-13 PROCEDURE — G0009 ADMIN PNEUMOCOCCAL VACCINE: HCPCS | Performed by: INTERNAL MEDICINE

## 2020-11-13 PROCEDURE — 82044 UR ALBUMIN SEMIQUANTITATIVE: CPT | Performed by: INTERNAL MEDICINE

## 2020-11-13 PROCEDURE — 90732 PPSV23 VACC 2 YRS+ SUBQ/IM: CPT | Performed by: INTERNAL MEDICINE

## 2020-11-13 PROCEDURE — G0438 PPPS, INITIAL VISIT: HCPCS | Performed by: INTERNAL MEDICINE

## 2020-11-13 NOTE — PROGRESS NOTES
QUICK REFERENCE INFORMATION:  The ABCs of the Annual Wellness Visit    Initial Medicare Wellness Visit    HEALTH RISK ASSESSMENT    1955    Recent Hospitalizations:  Recently treated at the following:  [facility].        Current Medical Providers:  Patient Care Team:  Domingo Sood MD as PCP - General  Domingo Sood MD as PCP - Family Medicine        Smoking Status:  Social History     Tobacco Use   Smoking Status Never Smoker   Smokeless Tobacco Never Used       Alcohol Consumption:  Social History     Substance and Sexual Activity   Alcohol Use No   • Frequency: Never       Depression Screen:   PHQ-2/PHQ-9 Depression Screening 11/13/2020   Little interest or pleasure in doing things 0   Feeling down, depressed, or hopeless 0   Total Score 0       Health Habits and Functional and Cognitive Screening:  Functional & Cognitive Status 11/13/2020   Do you have difficulty preparing food and eating? No   Do you have difficulty bathing yourself, getting dressed or grooming yourself? No   Do you have difficulty using the toilet? No   Do you have difficulty moving around from place to place? No   Do you have trouble with steps or getting out of a bed or a chair? No   Current Diet Well Balanced Diet   Dental Exam Up to date   Eye Exam Up to date   Exercise (times per week) 0 times per week   Current Exercise Activities Include None   Do you need help using the phone?  No   Are you deaf or do you have serious difficulty hearing?  Yes   Do you need help with transportation? No   Do you need help shopping? No   Do you need help preparing meals?  No   Do you need help with housework?  No   Do you need help with laundry? No   Do you need help taking your medications? No   Do you need help managing money? No   Do you ever drive or ride in a car without wearing a seat belt? No   Have you felt unusual stress, anger or loneliness in the last month? No   Who do you live with? Alone   If you need  help, do you have trouble finding someone available to you? No   Have you been bothered in the last four weeks by sexual problems? No   Do you have difficulty concentrating, remembering or making decisions? No       Fall Risk Screen:  MARJORIE Fall Risk Assessment was completed, and patient is at LOW risk for falls.Assessment completed on:11/13/2020    ACE III MINI        Does the patient have evidence of cognitive impairment? Yes    Asiprin use counseling: Does not need ASA (and currently is not on it)      Recent Lab Results:  CMP:  Lab Results   Component Value Date    BUN 20 11/10/2020    CREATININE 1.34 (H) 11/10/2020    EGFRIFNONA 40 (L) 11/10/2020    BCR 14.9 11/10/2020     11/10/2020    K 4.8 11/10/2020    CO2 29.5 (H) 11/10/2020    CALCIUM 9.6 11/10/2020    ALBUMIN 4.20 11/10/2020    BILITOT 0.5 11/10/2020    ALKPHOS 117 11/10/2020    AST 34 (H) 11/10/2020    ALT 44 (H) 11/10/2020     HbA1c:  Lab Results   Component Value Date    HGBA1C 7.64 (H) 11/10/2020    HGBA1C 7.77 (H) 05/12/2020     Microalbumin:  Lab Results   Component Value Date    MICROALBUR <1.2 11/07/2019    POCMALB 10 mg/L 11/13/2020    POCCREAT 300 mg/dL 11/13/2020     Lipid Panel  Lab Results   Component Value Date    CHOL 204 (H) 11/10/2020    TRIG 215 (H) 11/10/2020    HDL 55 11/10/2020     (H) 11/10/2020    AST 34 (H) 11/10/2020    ALT 44 (H) 11/10/2020       Visual Acuity:  No exam data present    Age-appropriate Screening Schedule:  Refer to the list below for future screening recommendations based on patient's age, sex and/or medical conditions. Orders for these recommended tests are listed in the plan section. The patient has been provided with a written plan.    Health Maintenance   Topic Date Due   • ZOSTER VACCINE (1 of 2) 07/21/2004   • MAMMOGRAM  09/11/2018   • DIABETIC EYE EXAM  09/11/2018   • URINE MICROALBUMIN  11/07/2020   • DIABETIC FOOT EXAM  11/13/2020   • HEMOGLOBIN A1C  05/10/2021   • LIPID PANEL  11/10/2021    • TDAP/TD VACCINES (2 - Td) 11/05/2025   • COLONOSCOPY  09/26/2028   • INFLUENZA VACCINE  Completed        Subjective   History of Present Illness    Wallace Dukes is a 65 y.o. female who presents for an Annual Wellness Visit.    CHRONIC CONDITIONS    The following portions of the patient's history were reviewed and updated as appropriate: allergies, current medications, past family history, past medical history, past social history, past surgical history and problem list.    Outpatient Medications Prior to Visit   Medication Sig Dispense Refill   • ALBUTEROL SULFATE  (90 Base) MCG/ACT inhaler INHALE 2 PUFFS EVERY 4 (FOUR) HOURS AS NEEDED FOR WHEEZING OR SHORTNESS OF AIR. 18 inhaler 3   • Ascorbic Acid (VITAMIN C) 500 MG capsule Take 2 capsules by mouth Daily.     • atorvastatin (LIPITOR) 40 MG tablet TAKE 1 TABLET BY MOUTH EVERY DAY (Patient taking differently: Take 40 mg by mouth Daily.) 90 tablet 1   • chlorthalidone (HYGROTON) 25 MG tablet Take 1 tablet by mouth Daily. 90 tablet 1   • cholecalciferol (VITAMIN D3) 1000 units tablet Take 2 tablets by mouth Every Morning.     • dextromethorphan-guaifenesin (MUCINEX DM MAX STRENGTH)  MG per 12 hr tablet Take  by mouth As Needed.     • escitalopram (LEXAPRO) 10 MG tablet TAKE 1 TABLET BY MOUTH EVERY DAY (Patient taking differently: Take 10 mg by mouth Daily.) 90 tablet 1   • loratadine (CLARITIN) 10 MG tablet Take 1 tablet by mouth Daily.     • losartan (COZAAR) 100 MG tablet TAKE 1 TABLET BY MOUTH EVERY DAY 90 tablet 1   • metFORMIN (GLUCOPHAGE) 1000 MG tablet Take 1 tablet by mouth 2 (Two) Times a Day With Meals. 180 tablet 3   • Multiple Vitamins-Minerals (MULTIVITAMIN ADULT PO) Take 1 tablet by mouth Daily.     • pantoprazole (PROTONIX) 40 MG EC tablet TAKE 1 TABLET BY MOUTH TWICE A DAY (Patient taking differently: Take 40 mg by mouth Daily.) 180 tablet 1   • acetaminophen (TYLENOL) 325 MG tablet Take 2 tablets by mouth Every 6 (Six) Hours As  Needed for Mild Pain  or Moderate Pain  (Do not exceed 4,000mg in 24 hours).     • HYDROcodone-acetaminophen (NORCO) 5-325 MG per tablet Take 1 tablet by mouth Every 6 (Six) Hours As Needed for Mild Pain  or Moderate Pain  (Pain). 15 tablet 0     No facility-administered medications prior to visit.        Patient Active Problem List   Diagnosis   • GERD (gastroesophageal reflux disease)   • Diabetic neuropathy associated with type 2 diabetes mellitus (CMS/[facility])   • Benign essential hypertension   • Venous (peripheral) insufficiency   • Mixed hyperlipidemia   • Allergic rhinitis   • Morbid obesity (CMS/[facility])   • Right elbow pain   • Type 2 diabetes mellitus with diabetic polyneuropathy, without long-term current use of insulin (CMS/[facility])   • Major depressive disorder with single episode, in partial remission (CMS/[facility])   • Acute calculous cholecystitis   • S/P laparoscopic cholecystectomy   • Diabetic nephropathy associated with type 2 diabetes mellitus (CMS/[facility])   • Stage 3b chronic kidney disease   • Personal history of breast cancer       Advance Care Planning:  ACP discussion was held with the patient during this visit. Patient has an advance directive in EMR which is still valid.     Identification of Risk Factors:  Risk factors include: Advance Directive Discussion.    Review of Systems   Constitutional: Negative for chills, fatigue and fever.   HENT: Positive for sinus pressure. Negative for congestion and ear pain.    Respiratory: Negative for cough, chest tightness, shortness of breath and wheezing.    Cardiovascular: Negative for chest pain and palpitations.   Gastrointestinal: Negative for abdominal pain, blood in stool and constipation.   Skin: Negative for color change.   Allergic/Immunologic: Positive for environmental allergies.   Neurological: Positive for dizziness. Negative for speech difficulty and headaches.   Psychiatric/Behavioral: Negative for confusion. The patient is not nervous/anxious.         Compared to one year ago, the patient feels her physical health is the same.  Compared to one year ago, the patient feels her mental health is the same.    Objective     Physical Exam  Vitals signs and nursing note reviewed.   Constitutional:       Appearance: She is well-developed. She is obese.   HENT:      Head: Normocephalic and atraumatic.      Right Ear: External ear normal.      Left Ear: External ear normal.      Nose: Nose normal.      Mouth/Throat:      Pharynx: No oropharyngeal exudate.   Eyes:      Conjunctiva/sclera: Conjunctivae normal.      Pupils: Pupils are equal, round, and reactive to light.   Neck:      Musculoskeletal: Normal range of motion and neck supple.      Thyroid: No thyromegaly.      Vascular: No JVD.   Cardiovascular:      Rate and Rhythm: Normal rate and regular rhythm.      Pulses:           Dorsalis pedis pulses are 1+ on the right side and 1+ on the left side.      Heart sounds: Normal heart sounds. No murmur. No friction rub. No gallop.    Pulmonary:      Effort: Pulmonary effort is normal. No respiratory distress.      Breath sounds: Normal breath sounds. No wheezing or rales.   Abdominal:      General: Bowel sounds are normal. There is no distension.      Palpations: Abdomen is soft. There is no mass.      Tenderness: There is no abdominal tenderness. There is no guarding or rebound.      Hernia: No hernia is present.   Musculoskeletal: Normal range of motion.         General: No tenderness.      Right foot: No deformity.      Left foot: No deformity.   Feet:      Right foot:      Protective Sensation: 5 sites tested. 5 sites sensed.      Skin integrity: Skin integrity normal.      Left foot:      Protective Sensation: 5 sites tested. 5 sites sensed.      Skin integrity: Skin integrity normal.      Comments: Sensation in both feet intact to monofilament.     Diabetic Foot Exam Performed, monofilament exam performed    Lymphadenopathy:      Cervical: No cervical adenopathy.  "  Skin:     General: Skin is warm and dry.      Findings: No erythema or rash.   Neurological:      Mental Status: She is alert and oriented to person, place, and time.      Cranial Nerves: No cranial nerve deficit.      Sensory: No sensory deficit.      Motor: No abnormal muscle tone.      Coordination: Coordination normal.      Deep Tendon Reflexes: Reflexes normal.   Psychiatric:         Behavior: Behavior normal.         Thought Content: Thought content normal.         Judgment: Judgment normal.          Procedures     Vitals:    11/13/20 0805   BP: 122/74   BP Location: Left arm   Patient Position: Sitting   Cuff Size: Adult   Pulse: 72   Temp: 97.1 °F (36.2 °C)   Weight: 109 kg (241 lb 3.2 oz)   Height: 165.1 cm (65\")   PainSc: 0-No pain       Patient's Body mass index is 40.14 kg/m². BMI is above normal parameters. Recommendations include: exercise counseling and nutrition counseling.      Assessment/Plan   Problem List Items Addressed This Visit        Digestive    GERD (gastroesophageal reflux disease)    Relevant Medications    pantoprazole (PROTONIX) 40 MG EC tablet    Morbid obesity (CMS/HCC)       Endocrine    Diabetic neuropathy associated with type 2 diabetes mellitus (CMS/HCC)    Relevant Medications    metFORMIN (GLUCOPHAGE) 1000 MG tablet    Other Relevant Orders    Microalbumin / Creatinine Urine Ratio - Urine, Clean Catch    Diabetic nephropathy associated with type 2 diabetes mellitus (CMS/HCC)    Relevant Medications    metFORMIN (GLUCOPHAGE) 1000 MG tablet    chlorthalidone (HYGROTON) 25 MG tablet       Genitourinary    Stage 3b chronic kidney disease    Relevant Medications    chlorthalidone (HYGROTON) 25 MG tablet       Other    Major depressive disorder with single episode, in partial remission (CMS/HCC)    Relevant Medications    escitalopram (LEXAPRO) 10 MG tablet    Personal history of breast cancer      Other Visit Diagnoses     Preventative health care    -  Primary    Essential " hypertension        Relevant Orders    POC Microalbumin (Completed)        Patient Self-Management and Personalized Health Advice  The patient has been provided with information about: diet, exercise, weight management and prevention of cardiac or vascular disease and preventive services including:   · Annual Wellness Visit (AWV).    Outpatient Encounter Medications as of 11/13/2020   Medication Sig Dispense Refill   • ALBUTEROL SULFATE  (90 Base) MCG/ACT inhaler INHALE 2 PUFFS EVERY 4 (FOUR) HOURS AS NEEDED FOR WHEEZING OR SHORTNESS OF AIR. 18 inhaler 3   • Ascorbic Acid (VITAMIN C) 500 MG capsule Take 2 capsules by mouth Daily.     • atorvastatin (LIPITOR) 40 MG tablet TAKE 1 TABLET BY MOUTH EVERY DAY (Patient taking differently: Take 40 mg by mouth Daily.) 90 tablet 1   • chlorthalidone (HYGROTON) 25 MG tablet Take 1 tablet by mouth Daily. 90 tablet 1   • cholecalciferol (VITAMIN D3) 1000 units tablet Take 2 tablets by mouth Every Morning.     • dextromethorphan-guaifenesin (MUCINEX DM MAX STRENGTH)  MG per 12 hr tablet Take  by mouth As Needed.     • escitalopram (LEXAPRO) 10 MG tablet TAKE 1 TABLET BY MOUTH EVERY DAY (Patient taking differently: Take 10 mg by mouth Daily.) 90 tablet 1   • loratadine (CLARITIN) 10 MG tablet Take 1 tablet by mouth Daily.     • losartan (COZAAR) 100 MG tablet TAKE 1 TABLET BY MOUTH EVERY DAY 90 tablet 1   • metFORMIN (GLUCOPHAGE) 1000 MG tablet Take 1 tablet by mouth 2 (Two) Times a Day With Meals. 180 tablet 3   • Multiple Vitamins-Minerals (MULTIVITAMIN ADULT PO) Take 1 tablet by mouth Daily.     • pantoprazole (PROTONIX) 40 MG EC tablet TAKE 1 TABLET BY MOUTH TWICE A DAY (Patient taking differently: Take 40 mg by mouth Daily.) 180 tablet 1   • acetaminophen (TYLENOL) 325 MG tablet Take 2 tablets by mouth Every 6 (Six) Hours As Needed for Mild Pain  or Moderate Pain  (Do not exceed 4,000mg in 24 hours).     • [DISCONTINUED] HYDROcodone-acetaminophen (NORCO) 5-325  MG per tablet Take 1 tablet by mouth Every 6 (Six) Hours As Needed for Mild Pain  or Moderate Pain  (Pain). 15 tablet 0     No facility-administered encounter medications on file as of 11/13/2020.      Plan    Overall she remains in good health but very much needs to lose weight and improve fitness.  She has 3 more weeks of restrictions following repair of ventral hernia for she can resume regular physical activity.  She is status post bilateral mastectomies so mammography is no longer indicated.  Colonoscopy was performed 9/26/2018, due at 10 years.  She has had flu vaccine today, Pneumovax is given today.    A1c is slightly improved to 7.64.  Foot examination was performed today.  She is due for eye examination.  She will continue Metformin and efforts at weight loss and healthy diet.  Would consider adding Jardiance or Trulicity.    Blood pressure is controlled on chlorthalidone and losartan.    GERD is well controlled.  Is not clear if she needs the high-dose pantoprazole she is currently taking.  Suggested that she slowly wean 40 mg alternating with 40 mg twice a day every other day for 3 to 4 weeks.    BMI is 40.1.  This remains in the morbidly obese range.  Once again we discussed the importance of restriction of carbohydrates to lose weight.    GFR is stable at 43.  The treatment remains control of blood pressure and glucose and avoidance of NSAIDs.    Mood appears to be well controlled on Lexapro.        Reviewed use of high risk medication in the elderly: yes  Reviewed for potential of harmful drug interactions in the elderly: yes    Follow Up:  Return in about 6 months (around 5/13/2021) for follow up fasting.     There are no Patient Instructions on file for this visit.    An After Visit Summary and PPPS with all of these plans were given to the patient.

## 2020-12-28 RX ORDER — ATORVASTATIN CALCIUM 40 MG/1
TABLET, FILM COATED ORAL
Qty: 90 TABLET | Refills: 1 | Status: SHIPPED | OUTPATIENT
Start: 2020-12-28 | End: 2021-08-30

## 2021-02-15 RX ORDER — PANTOPRAZOLE SODIUM 40 MG/1
TABLET, DELAYED RELEASE ORAL
Qty: 180 TABLET | Refills: 1 | Status: SHIPPED | OUTPATIENT
Start: 2021-02-15 | End: 2021-08-30

## 2021-03-04 RX ORDER — ESCITALOPRAM OXALATE 10 MG/1
TABLET ORAL
Qty: 90 TABLET | Refills: 1 | Status: SHIPPED | OUTPATIENT
Start: 2021-03-04 | End: 2021-11-15

## 2021-04-27 RX ORDER — CHLORTHALIDONE 25 MG/1
TABLET ORAL
Qty: 90 TABLET | Refills: 1 | Status: SHIPPED | OUTPATIENT
Start: 2021-04-27 | End: 2021-10-18

## 2021-04-27 RX ORDER — LOSARTAN POTASSIUM 100 MG/1
TABLET ORAL
Qty: 90 TABLET | Refills: 1 | Status: SHIPPED | OUTPATIENT
Start: 2021-04-27 | End: 2021-10-18

## 2021-05-05 DIAGNOSIS — E78.2 MIXED HYPERLIPIDEMIA: ICD-10-CM

## 2021-05-05 DIAGNOSIS — I10 BENIGN ESSENTIAL HYPERTENSION: Primary | ICD-10-CM

## 2021-05-05 DIAGNOSIS — E11.42 TYPE 2 DIABETES MELLITUS WITH DIABETIC POLYNEUROPATHY, WITHOUT LONG-TERM CURRENT USE OF INSULIN (HCC): ICD-10-CM

## 2021-05-07 ENCOUNTER — LAB (OUTPATIENT)
Dept: LAB | Facility: HOSPITAL | Age: 66
End: 2021-05-07

## 2021-05-07 DIAGNOSIS — E78.2 MIXED HYPERLIPIDEMIA: ICD-10-CM

## 2021-05-07 DIAGNOSIS — E11.42 TYPE 2 DIABETES MELLITUS WITH DIABETIC POLYNEUROPATHY, WITHOUT LONG-TERM CURRENT USE OF INSULIN (HCC): ICD-10-CM

## 2021-05-07 LAB
ALBUMIN SERPL-MCNC: 4.4 G/DL (ref 3.5–5.2)
ALBUMIN/GLOB SERPL: 1.6 G/DL
ALP SERPL-CCNC: 119 U/L (ref 39–117)
ALT SERPL W P-5'-P-CCNC: 38 U/L (ref 1–33)
ANION GAP SERPL CALCULATED.3IONS-SCNC: 12.7 MMOL/L (ref 5–15)
AST SERPL-CCNC: 26 U/L (ref 1–32)
BILIRUB SERPL-MCNC: 0.3 MG/DL (ref 0–1.2)
BUN SERPL-MCNC: 31 MG/DL (ref 8–23)
BUN/CREAT SERPL: 20.7 (ref 7–25)
CALCIUM SPEC-SCNC: 9.9 MG/DL (ref 8.6–10.5)
CHLORIDE SERPL-SCNC: 103 MMOL/L (ref 98–107)
CHOLEST SERPL-MCNC: 223 MG/DL (ref 0–200)
CO2 SERPL-SCNC: 26.3 MMOL/L (ref 22–29)
CREAT SERPL-MCNC: 1.5 MG/DL (ref 0.57–1)
GFR SERPL CREATININE-BSD FRML MDRD: 35 ML/MIN/1.73
GLOBULIN UR ELPH-MCNC: 2.8 GM/DL
GLUCOSE SERPL-MCNC: 157 MG/DL (ref 65–99)
HBA1C MFR BLD: 7.2 % (ref 4.8–5.6)
HDLC SERPL-MCNC: 52 MG/DL (ref 40–60)
LDLC SERPL CALC-MCNC: 120 MG/DL (ref 0–100)
LDLC/HDLC SERPL: 2.16 {RATIO}
POTASSIUM SERPL-SCNC: 4.7 MMOL/L (ref 3.5–5.2)
PROT SERPL-MCNC: 7.2 G/DL (ref 6–8.5)
SODIUM SERPL-SCNC: 142 MMOL/L (ref 136–145)
TRIGL SERPL-MCNC: 293 MG/DL (ref 0–150)
VLDLC SERPL-MCNC: 51 MG/DL (ref 5–40)

## 2021-05-07 PROCEDURE — 80053 COMPREHEN METABOLIC PANEL: CPT

## 2021-05-07 PROCEDURE — 83036 HEMOGLOBIN GLYCOSYLATED A1C: CPT

## 2021-05-07 PROCEDURE — 80061 LIPID PANEL: CPT

## 2021-05-13 ENCOUNTER — OFFICE VISIT (OUTPATIENT)
Dept: INTERNAL MEDICINE | Facility: CLINIC | Age: 66
End: 2021-05-13

## 2021-05-13 VITALS
OXYGEN SATURATION: 96 % | TEMPERATURE: 98.6 F | SYSTOLIC BLOOD PRESSURE: 122 MMHG | WEIGHT: 234 LBS | HEART RATE: 86 BPM | DIASTOLIC BLOOD PRESSURE: 78 MMHG | HEIGHT: 65 IN | BODY MASS INDEX: 38.99 KG/M2

## 2021-05-13 DIAGNOSIS — E11.42 TYPE 2 DIABETES MELLITUS WITH DIABETIC POLYNEUROPATHY, WITHOUT LONG-TERM CURRENT USE OF INSULIN (HCC): ICD-10-CM

## 2021-05-13 DIAGNOSIS — Z78.0 MENOPAUSE: ICD-10-CM

## 2021-05-13 DIAGNOSIS — F32.4 MAJOR DEPRESSIVE DISORDER WITH SINGLE EPISODE, IN PARTIAL REMISSION (HCC): ICD-10-CM

## 2021-05-13 DIAGNOSIS — I10 BENIGN ESSENTIAL HYPERTENSION: ICD-10-CM

## 2021-05-13 DIAGNOSIS — J20.8 ACUTE BRONCHITIS DUE TO OTHER SPECIFIED ORGANISMS: ICD-10-CM

## 2021-05-13 DIAGNOSIS — E66.01 MORBID OBESITY (HCC): ICD-10-CM

## 2021-05-13 DIAGNOSIS — E11.21 DIABETIC NEPHROPATHY ASSOCIATED WITH TYPE 2 DIABETES MELLITUS (HCC): Primary | ICD-10-CM

## 2021-05-13 DIAGNOSIS — N18.32 STAGE 3B CHRONIC KIDNEY DISEASE (HCC): ICD-10-CM

## 2021-05-13 DIAGNOSIS — E78.2 MIXED HYPERLIPIDEMIA: ICD-10-CM

## 2021-05-13 PROCEDURE — 99214 OFFICE O/P EST MOD 30 MIN: CPT | Performed by: INTERNAL MEDICINE

## 2021-05-13 RX ORDER — AZITHROMYCIN 250 MG/1
TABLET, FILM COATED ORAL
Qty: 6 TABLET | Refills: 0 | Status: SHIPPED | OUTPATIENT
Start: 2021-05-13 | End: 2021-09-15 | Stop reason: SDUPTHER

## 2021-05-13 NOTE — PROGRESS NOTES
[facility]     Wallace Dukes  1955   [id number]      Patient Care Team:  Domingo Sood MD as PCP - General  Domingo Sood MD as PCP - Family Medicine    Chief Complaint::   Chief Complaint   Patient presents with   • Diabetes     6 mo. fasting f/u  already did labs   • Cough     runny nose , post nasal drip    • Mass     left wrist         HPI  Ms. Dukes comes in for follow-up of her diabetes, chronic kidney disease, hypertension, hyperlipidemia, obesity, and depression.  She has lost nearly 10 pounds since last visit.  She is done this by watching her diet.  She states that her blood pressure has been better as well.  She is concerned that occasionally when she checks her blood pressure her heart rate is in the 40s.  She has had no lightheaded or dizziness or any other symptoms associated with that.  For the past few days she has had rhinorrhea, congestion and cough.  5 days ago she had a temperature of 100 but that has not recurred.  Otherwise she feels about the same.  Her cough is nonproductive.    Chronic Conditions:      Patient Active Problem List   Diagnosis   • GERD (gastroesophageal reflux disease)   • Diabetic neuropathy associated with type 2 diabetes mellitus (CMS/HCC)   • Benign essential hypertension   • Venous (peripheral) insufficiency   • Mixed hyperlipidemia   • Allergic rhinitis   • Morbid obesity (CMS/HCC)   • Right elbow pain   • Type 2 diabetes mellitus with diabetic polyneuropathy, without long-term current use of insulin (CMS/HCC)   • Major depressive disorder with single episode, in partial remission (CMS/HCC)   • Acute calculous cholecystitis   • S/P laparoscopic cholecystectomy   • Diabetic nephropathy associated with type 2 diabetes mellitus (CMS/HCC)   • Stage 3b chronic kidney disease (CMS/HCC)   • Personal history of breast cancer        Past Medical History:   Diagnosis Date   • Cancer of breast (CMS/HCC) 2014   • Chest pain 2009    stress  nuclear   • Cholelithiasis 2019    Surgery   • Diabetes mellitus (CMS/HCC)     TYPE 2   • Diarrhea     SINCE GALLBLADDER REMOVAL   • Hernia of abdominal cavity    • Hypertension    • Kidney disorder     FOLLOWING BREAST CANCER AND SURGERY HAD TO STOP MEDICATIONS SUCH AS IBUPROFEN;  CREATININE WAS ELEVATED.  NO PROBLEMS AT THIS TIME, BUT AVOIDS IBUPROFEN.   • MRSA (methicillin resistant Staphylococcus aureus)     infection of right breast expander; bilateral removal of expanders   • Schwannoma 06/2016    left translabyrinthine craniotomy   • Wears glasses     RX   • Wears hearing aid     BILATERAL       Past Surgical History:   Procedure Laterality Date   • BREAST RECONSTRUCTION, BREAST TISSUE EXPANDER INSERTION Bilateral 07/2013   • BREAST SURGERY  2014   • BREAST TISSUE EXPANDER REMOVAL INSERTION OF IMPLANT Bilateral     bilateral removal of expanders   • CHOLECYSTECTOMY N/A 12/1/2019    Procedure: CHOLECYSTECTOMY LAPAROSCOPIC;  Surgeon: Rafiq Angelo MD;  Location:  [facility] OR;  Service: General   • COLONOSCOPY  2018   • CRANIOTOMY FOR TUMOR  06/2016    Left Translabyrithine craniotomy   • HYSTERECTOMY  1986   • MASTECTOMY Bilateral 2014    complicated by MRSA wound infection on right requiring removal of expander.   • TONSILLECTOMY     • VENTRAL/INCISIONAL HERNIA REPAIR N/A 10/19/2020    Procedure: OPEN INCISIONAL HERNIA REPAIR WITH MESH;  Surgeon: Rafiq Angelo MD;  Location:  [facility] OR;  Service: General;  Laterality: N/A;       Family History   Problem Relation Age of Onset   • Hypertension Mother    • Diabetes Mother    • Migraines Mother    • Arthritis Mother    • Thyroid disease Mother    • Diabetes Sister    • Heart disease Sister    • Hypertension Sister    • Hypertension Brother    • Heart disease Brother    • Diabetes Son    • Heart disease Father    • Vision loss Maternal Grandmother        Social History     Socioeconomic History   • Marital status:      Spouse name: Not on  file   • Number of children: Not on file   • Years of education: Not on file   • Highest education level: Not on file   Tobacco Use   • Smoking status: Never Smoker   • Smokeless tobacco: Never Used   Vaping Use   • Vaping Use: Never used   Substance and Sexual Activity   • Alcohol use: No   • Drug use: No   • Sexual activity: Not Currently     Birth control/protection: None       No Known Allergies      Current Outpatient Medications:   •  acetaminophen (TYLENOL) 325 MG tablet, Take 2 tablets by mouth Every 6 (Six) Hours As Needed for Mild Pain  or Moderate Pain  (Do not exceed 4,000mg in 24 hours)., Disp: , Rfl:   •  Ascorbic Acid (VITAMIN C) 500 MG capsule, Take 2 capsules by mouth Daily., Disp: , Rfl:   •  atorvastatin (LIPITOR) 40 MG tablet, TAKE 1 TABLET BY MOUTH EVERY DAY, Disp: 90 tablet, Rfl: 1  •  chlorthalidone (HYGROTON) 25 MG tablet, TAKE 1 TABLET BY MOUTH EVERY DAY, Disp: 90 tablet, Rfl: 1  •  cholecalciferol (VITAMIN D3) 1000 units tablet, Take 2 tablets by mouth Every Morning., Disp: , Rfl:   •  dextromethorphan-guaifenesin (MUCINEX DM MAX STRENGTH)  MG per 12 hr tablet, Take  by mouth As Needed., Disp: , Rfl:   •  escitalopram (LEXAPRO) 10 MG tablet, TAKE 1 TABLET BY MOUTH EVERY DAY, Disp: 90 tablet, Rfl: 1  •  loratadine (CLARITIN) 10 MG tablet, Take 1 tablet by mouth Daily., Disp: , Rfl:   •  losartan (COZAAR) 100 MG tablet, TAKE 1 TABLET BY MOUTH EVERY DAY, Disp: 90 tablet, Rfl: 1  •  metFORMIN (GLUCOPHAGE) 1000 MG tablet, TAKE 1 TABLET BY MOUTH TWICE A DAY WITH MEALS, Disp: 180 tablet, Rfl: 3  •  Multiple Vitamins-Minerals (MULTIVITAMIN ADULT PO), Take 1 tablet by mouth Daily., Disp: , Rfl:   •  pantoprazole (PROTONIX) 40 MG EC tablet, TAKE 1 TABLET BY MOUTH TWICE A DAY, Disp: 180 tablet, Rfl: 1  •  azithromycin (Zithromax Z-Lizandro) 250 MG tablet, Take 2 tablets the first day, then 1 tablet daily for 4 days., Disp: 6 tablet, Rfl: 0    Review of Systems   Constitutional: Negative.    HENT:  "Positive for postnasal drip and rhinorrhea.    Respiratory: Positive for cough. Negative for chest tightness and shortness of breath.    Cardiovascular: Negative.  Negative for chest pain.   Gastrointestinal: Negative for abdominal pain, blood in stool, constipation and diarrhea.        Vital Signs  Vitals:    05/13/21 0802   BP: 122/78   BP Location: Left arm   Patient Position: Sitting   Cuff Size: Large Adult   Pulse: 86   Temp: 98.6 °F (37 °C)   TempSrc: Temporal   SpO2: 96%   Weight: 106 kg (234 lb)   Height: 165.1 cm (65\")   PainSc: 0-No pain       Physical Exam  Vitals reviewed.   Constitutional:       Appearance: She is well-developed.   HENT:      Head: Normocephalic and atraumatic.   Cardiovascular:      Rate and Rhythm: Normal rate and regular rhythm.      Heart sounds: Normal heart sounds. No murmur heard.     Pulmonary:      Effort: Pulmonary effort is normal.      Breath sounds: Normal breath sounds.      Comments: Diffuse wheezing on forced expiration.  Neurological:      Mental Status: She is alert and oriented to person, place, and time.          Procedures    ACE III MINI             Assessment/Plan:    Diagnoses and all orders for this visit:    1. Diabetic nephropathy associated with type 2 diabetes mellitus (CMS/[facility]) (Primary)    2. Stage 3b chronic kidney disease (CMS/[facility])    3. Benign essential hypertension    4. Mixed hyperlipidemia    5. Morbid obesity (CMS/HCC)    6. Type 2 diabetes mellitus with diabetic polyneuropathy, without long-term current use of insulin (CMS/[facility])    7. Major depressive disorder with single episode, in partial remission (CMS/[facility])    8. Menopause  -     DEXA Bone Density Axial; Future    Other orders  -     azithromycin (Zithromax Z-Lizandro) 250 MG tablet; Take 2 tablets the first day, then 1 tablet daily for 4 days.  Dispense: 6 tablet; Refill: 0    Plan    A1c is well controlled at 7.2.  She is also lost weight.  I encouraged her to keep it up.  She will continue " Metformin and healthy diet.    GFR is down slightly to 35.  The treatment is to control blood pressure and blood glucose and avoid NSAIDs which she does.  If this continues to drop would refer to nephrology.    Blood pressure is well controlled on chlorthalidone and losartan.    BMI is 38.9 down from 40.  This represents a loss of 8 pounds.  I applauded her efforts and encouraged her to keep them up.    Her mood is well compensated on Lexapro.    Acute bronchitis is most likely viral.  She is given a azithromycin but of asked her to not take it for a day or so if it improves she does not need it.          Plan of care reviewed with patient at the conclusion of today's visit. Education was provided regarding diagnosis, management, and any prescribed or recommended OTC medications.Patient verbalizes understanding of and agreement with management plan.         Domingo Sood MD           Answers for HPI/ROS submitted by the patient on 5/6/2021  What is the primary reason for your visit?: Physical

## 2021-08-09 ENCOUNTER — HOSPITAL ENCOUNTER (OUTPATIENT)
Dept: BONE DENSITY | Facility: HOSPITAL | Age: 66
Discharge: HOME | End: 2021-08-09
Admitting: INTERNAL MEDICINE

## 2021-08-09 DIAGNOSIS — Z78.0 MENOPAUSE: ICD-10-CM

## 2021-08-09 PROCEDURE — 77080 DXA BONE DENSITY AXIAL: CPT

## 2021-08-30 RX ORDER — ATORVASTATIN CALCIUM 40 MG/1
TABLET, FILM COATED ORAL
Qty: 90 TABLET | Refills: 1 | Status: SHIPPED | OUTPATIENT
Start: 2021-08-30 | End: 2022-02-21

## 2021-08-30 RX ORDER — PANTOPRAZOLE SODIUM 40 MG/1
TABLET, DELAYED RELEASE ORAL
Qty: 180 TABLET | Refills: 1 | Status: SHIPPED | OUTPATIENT
Start: 2021-08-30 | End: 2022-02-22

## 2021-09-15 RX ORDER — ALBUTEROL SULFATE 90 UG/1
2 AEROSOL, METERED RESPIRATORY (INHALATION) EVERY 4 HOURS PRN
Qty: 18 G | Refills: 2 | Status: SHIPPED | OUTPATIENT
Start: 2021-09-15

## 2021-09-15 RX ORDER — AZITHROMYCIN 250 MG/1
TABLET, FILM COATED ORAL
Qty: 6 TABLET | Refills: 0 | Status: SHIPPED | OUTPATIENT
Start: 2021-09-15 | End: 2021-11-30

## 2021-10-15 ENCOUNTER — IMMUNIZATION (OUTPATIENT)
Dept: VACCINE CLINIC | Facility: HOSPITAL | Age: 66
End: 2021-10-15

## 2021-10-15 PROCEDURE — 91300 HC SARSCOV02 VAC 30MCG/0.3ML IM: CPT | Performed by: INTERNAL MEDICINE

## 2021-10-15 PROCEDURE — 0004A ADM SARSCOV2 30MCG/0.3ML BOOSTER: CPT | Performed by: INTERNAL MEDICINE

## 2021-10-18 RX ORDER — LOSARTAN POTASSIUM 100 MG/1
TABLET ORAL
Qty: 90 TABLET | Refills: 1 | Status: SHIPPED | OUTPATIENT
Start: 2021-10-18 | End: 2022-04-15

## 2021-10-18 RX ORDER — CHLORTHALIDONE 25 MG/1
TABLET ORAL
Qty: 90 TABLET | Refills: 1 | Status: SHIPPED | OUTPATIENT
Start: 2021-10-18 | End: 2022-04-15

## 2021-11-15 RX ORDER — ESCITALOPRAM OXALATE 10 MG/1
TABLET ORAL
Qty: 90 TABLET | Refills: 1 | Status: SHIPPED | OUTPATIENT
Start: 2021-11-15 | End: 2022-05-16

## 2021-11-18 DIAGNOSIS — I10 BENIGN ESSENTIAL HYPERTENSION: Primary | ICD-10-CM

## 2021-11-18 DIAGNOSIS — E78.2 MIXED HYPERLIPIDEMIA: ICD-10-CM

## 2021-11-18 DIAGNOSIS — Z11.59 ENCOUNTER FOR HEPATITIS C SCREENING TEST FOR LOW RISK PATIENT: ICD-10-CM

## 2021-11-18 DIAGNOSIS — E11.42 TYPE 2 DIABETES MELLITUS WITH DIABETIC POLYNEUROPATHY, WITHOUT LONG-TERM CURRENT USE OF INSULIN (HCC): ICD-10-CM

## 2021-11-19 ENCOUNTER — LAB (OUTPATIENT)
Dept: LAB | Facility: HOSPITAL | Age: 66
End: 2021-11-19

## 2021-11-19 DIAGNOSIS — E78.2 MIXED HYPERLIPIDEMIA: ICD-10-CM

## 2021-11-19 DIAGNOSIS — I10 BENIGN ESSENTIAL HYPERTENSION: ICD-10-CM

## 2021-11-19 DIAGNOSIS — Z11.59 ENCOUNTER FOR HEPATITIS C SCREENING TEST FOR LOW RISK PATIENT: ICD-10-CM

## 2021-11-19 DIAGNOSIS — E11.42 TYPE 2 DIABETES MELLITUS WITH DIABETIC POLYNEUROPATHY, WITHOUT LONG-TERM CURRENT USE OF INSULIN (HCC): ICD-10-CM

## 2021-11-19 LAB
ALBUMIN UR-MCNC: <1.2 MG/DL
BASOPHILS # BLD AUTO: 0.08 10*3/MM3 (ref 0–0.2)
BASOPHILS NFR BLD AUTO: 1 % (ref 0–1.5)
CHOLEST SERPL-MCNC: 209 MG/DL (ref 0–200)
CREAT UR-MCNC: 197.9 MG/DL
DEPRECATED RDW RBC AUTO: 45 FL (ref 37–54)
EOSINOPHIL # BLD AUTO: 0.21 10*3/MM3 (ref 0–0.4)
EOSINOPHIL NFR BLD AUTO: 2.6 % (ref 0.3–6.2)
ERYTHROCYTE [DISTWIDTH] IN BLOOD BY AUTOMATED COUNT: 13 % (ref 12.3–15.4)
HBA1C MFR BLD: 7.05 % (ref 4.8–5.6)
HCT VFR BLD AUTO: 38 % (ref 34–46.6)
HDLC SERPL-MCNC: 50 MG/DL (ref 40–60)
HGB BLD-MCNC: 12.1 G/DL (ref 12–15.9)
IMM GRANULOCYTES # BLD AUTO: 0.03 10*3/MM3 (ref 0–0.05)
IMM GRANULOCYTES NFR BLD AUTO: 0.4 % (ref 0–0.5)
LDLC SERPL CALC-MCNC: 123 MG/DL (ref 0–100)
LDLC/HDLC SERPL: 2.37 {RATIO}
LYMPHOCYTES # BLD AUTO: 2.45 10*3/MM3 (ref 0.7–3.1)
LYMPHOCYTES NFR BLD AUTO: 30.2 % (ref 19.6–45.3)
MCH RBC QN AUTO: 30 PG (ref 26.6–33)
MCHC RBC AUTO-ENTMCNC: 31.8 G/DL (ref 31.5–35.7)
MCV RBC AUTO: 94.3 FL (ref 79–97)
MICROALBUMIN/CREAT UR: NORMAL MG/G{CREAT}
MONOCYTES # BLD AUTO: 0.53 10*3/MM3 (ref 0.1–0.9)
MONOCYTES NFR BLD AUTO: 6.5 % (ref 5–12)
NEUTROPHILS NFR BLD AUTO: 4.81 10*3/MM3 (ref 1.7–7)
NEUTROPHILS NFR BLD AUTO: 59.3 % (ref 42.7–76)
NRBC BLD AUTO-RTO: 0 /100 WBC (ref 0–0.2)
PLATELET # BLD AUTO: 433 10*3/MM3 (ref 140–450)
PMV BLD AUTO: 9.8 FL (ref 6–12)
RBC # BLD AUTO: 4.03 10*6/MM3 (ref 3.77–5.28)
TRIGL SERPL-MCNC: 203 MG/DL (ref 0–150)
VLDLC SERPL-MCNC: 36 MG/DL (ref 5–40)
WBC NRBC COR # BLD: 8.11 10*3/MM3 (ref 3.4–10.8)

## 2021-11-19 PROCEDURE — 82043 UR ALBUMIN QUANTITATIVE: CPT

## 2021-11-19 PROCEDURE — 80053 COMPREHEN METABOLIC PANEL: CPT

## 2021-11-19 PROCEDURE — 83036 HEMOGLOBIN GLYCOSYLATED A1C: CPT

## 2021-11-19 PROCEDURE — 86803 HEPATITIS C AB TEST: CPT

## 2021-11-19 PROCEDURE — 82570 ASSAY OF URINE CREATININE: CPT

## 2021-11-19 PROCEDURE — 80061 LIPID PANEL: CPT

## 2021-11-19 PROCEDURE — 85025 COMPLETE CBC W/AUTO DIFF WBC: CPT

## 2021-11-20 LAB
ALBUMIN SERPL-MCNC: 4.6 G/DL (ref 3.5–5.2)
ALBUMIN/GLOB SERPL: 1.6 G/DL
ALP SERPL-CCNC: 113 U/L (ref 39–117)
ALT SERPL W P-5'-P-CCNC: 25 U/L (ref 1–33)
ANION GAP SERPL CALCULATED.3IONS-SCNC: 13.8 MMOL/L (ref 5–15)
AST SERPL-CCNC: 23 U/L (ref 1–32)
BILIRUB SERPL-MCNC: 0.5 MG/DL (ref 0–1.2)
BUN SERPL-MCNC: 28 MG/DL (ref 8–23)
BUN/CREAT SERPL: 19.2 (ref 7–25)
CALCIUM SPEC-SCNC: 9.5 MG/DL (ref 8.6–10.5)
CHLORIDE SERPL-SCNC: 102 MMOL/L (ref 98–107)
CO2 SERPL-SCNC: 26.2 MMOL/L (ref 22–29)
CREAT SERPL-MCNC: 1.46 MG/DL (ref 0.57–1)
GFR SERPL CREATININE-BSD FRML MDRD: 36 ML/MIN/1.73
GLOBULIN UR ELPH-MCNC: 2.9 GM/DL
GLUCOSE SERPL-MCNC: 124 MG/DL (ref 65–99)
HCV AB SER DONR QL: NORMAL
POTASSIUM SERPL-SCNC: 4.4 MMOL/L (ref 3.5–5.2)
PROT SERPL-MCNC: 7.5 G/DL (ref 6–8.5)
SODIUM SERPL-SCNC: 142 MMOL/L (ref 136–145)

## 2021-11-30 ENCOUNTER — OFFICE VISIT (OUTPATIENT)
Dept: INTERNAL MEDICINE | Facility: CLINIC | Age: 66
End: 2021-11-30

## 2021-11-30 VITALS
DIASTOLIC BLOOD PRESSURE: 80 MMHG | HEIGHT: 64 IN | OXYGEN SATURATION: 99 % | WEIGHT: 229.4 LBS | SYSTOLIC BLOOD PRESSURE: 130 MMHG | TEMPERATURE: 97.8 F | BODY MASS INDEX: 39.16 KG/M2 | HEART RATE: 88 BPM

## 2021-11-30 DIAGNOSIS — E11.42 DIABETIC POLYNEUROPATHY ASSOCIATED WITH TYPE 2 DIABETES MELLITUS (HCC): ICD-10-CM

## 2021-11-30 DIAGNOSIS — K21.9 GASTROESOPHAGEAL REFLUX DISEASE WITHOUT ESOPHAGITIS: ICD-10-CM

## 2021-11-30 DIAGNOSIS — E78.2 MIXED HYPERLIPIDEMIA: ICD-10-CM

## 2021-11-30 DIAGNOSIS — I10 BENIGN ESSENTIAL HYPERTENSION: ICD-10-CM

## 2021-11-30 DIAGNOSIS — F32.4 MAJOR DEPRESSIVE DISORDER WITH SINGLE EPISODE, IN PARTIAL REMISSION (HCC): ICD-10-CM

## 2021-11-30 DIAGNOSIS — Z00.00 PREVENTATIVE HEALTH CARE: Primary | ICD-10-CM

## 2021-11-30 DIAGNOSIS — E66.01 MORBID OBESITY (HCC): ICD-10-CM

## 2021-11-30 DIAGNOSIS — E11.21 DIABETIC NEPHROPATHY ASSOCIATED WITH TYPE 2 DIABETES MELLITUS (HCC): ICD-10-CM

## 2021-11-30 DIAGNOSIS — N18.32 STAGE 3B CHRONIC KIDNEY DISEASE (HCC): ICD-10-CM

## 2021-11-30 PROCEDURE — 1170F FXNL STATUS ASSESSED: CPT | Performed by: INTERNAL MEDICINE

## 2021-11-30 PROCEDURE — 90662 IIV NO PRSV INCREASED AG IM: CPT | Performed by: INTERNAL MEDICINE

## 2021-11-30 PROCEDURE — 1126F AMNT PAIN NOTED NONE PRSNT: CPT | Performed by: INTERNAL MEDICINE

## 2021-11-30 PROCEDURE — G0008 ADMIN INFLUENZA VIRUS VAC: HCPCS | Performed by: INTERNAL MEDICINE

## 2021-11-30 PROCEDURE — G0439 PPPS, SUBSEQ VISIT: HCPCS | Performed by: INTERNAL MEDICINE

## 2021-11-30 PROCEDURE — 1160F RVW MEDS BY RX/DR IN RCRD: CPT | Performed by: INTERNAL MEDICINE

## 2021-11-30 NOTE — PROGRESS NOTES
QUICK REFERENCE INFORMATION:  The ABCs of the Annual Wellness Visit    Subsequent Medicare Wellness Visit    HEALTH RISK ASSESSMENT    1955    Recent Hospitalizations:  No hospitalization(s) within the last year..        Current Medical Providers:  Patient Care Team:  Domingo Sood MD as PCP - General  Domingo Sood MD as PCP - Family Medicine        Smoking Status:  Social History     Tobacco Use   Smoking Status Never Smoker   Smokeless Tobacco Never Used       Alcohol Consumption:  Social History     Substance and Sexual Activity   Alcohol Use No       Depression Screen:   PHQ-2/PHQ-9 Depression Screening 11/30/2021   Little interest or pleasure in doing things 0   Feeling down, depressed, or hopeless 0   Total Score 0       Health Habits and Functional and Cognitive Screening:  Functional & Cognitive Status 11/30/2021   Do you have difficulty preparing food and eating? No   Do you have difficulty bathing yourself, getting dressed or grooming yourself? No   Do you have difficulty using the toilet? No   Do you have difficulty moving around from place to place? No   Do you have trouble with steps or getting out of a bed or a chair? No   Current Diet Well Balanced Diet   Dental Exam Up to date   Eye Exam Up to date   Exercise (times per week) 0 times per week   Current Exercises Include No Regular Exercise   Current Exercise Activities Include -   Do you need help using the phone?  No   Are you deaf or do you have serious difficulty hearing?  Yes   Do you need help with transportation? No   Do you need help shopping? No   Do you need help preparing meals?  No   Do you need help with housework?  No   Do you need help with laundry? No   Do you need help taking your medications? No   Do you need help managing money? No   Do you ever drive or ride in a car without wearing a seat belt? No   Have you felt unusual stress, anger or loneliness in the last month? No   Who do you live with? Alone   If  you need help, do you have trouble finding someone available to you? No   Have you been bothered in the last four weeks by sexual problems? No   Do you have difficulty concentrating, remembering or making decisions? No       Fall Risk Screen:  MARJORIE Fall Risk Assessment was completed, and patient is at LOW risk for falls.Assessment completed on:11/30/2021    ACE III MINI        Does the patient have evidence of cognitive impairment? No    Aspirin use counseling: Does not need ASA (and currently is not on it)    Recent Lab Results:  CMP:  Lab Results   Component Value Date    BUN 28 (H) 11/19/2021    CREATININE 1.46 (H) 11/19/2021    EGFRIFNONA 36 (L) 11/19/2021    BCR 19.2 11/19/2021     11/19/2021    K 4.4 11/19/2021    CO2 26.2 11/19/2021    CALCIUM 9.5 11/19/2021    ALBUMIN 4.60 11/19/2021    BILITOT 0.5 11/19/2021    ALKPHOS 113 11/19/2021    AST 23 11/19/2021    ALT 25 11/19/2021     HbA1c:  Lab Results   Component Value Date    HGBA1C 7.05 (H) 11/19/2021    HGBA1C 7.20 (H) 05/07/2021     Microalbumin:  Lab Results   Component Value Date    MICROALBUR <1.2 11/19/2021    POCMALB 10 mg/L 11/13/2020    POCCREAT 300 mg/dL 11/13/2020     Lipid Panel  Lab Results   Component Value Date    CHOL 209 (H) 11/19/2021    TRIG 203 (H) 11/19/2021    HDL 50 11/19/2021     (H) 11/19/2021    AST 23 11/19/2021    ALT 25 11/19/2021       Visual Acuity:  No exam data present    Age-appropriate Screening Schedule:  Refer to the list below for future screening recommendations based on patient's age, sex and/or medical conditions. Orders for these recommended tests are listed in the plan section. The patient has been provided with a written plan.    Health Maintenance   Topic Date Due   • ZOSTER VACCINE (1 of 2) Never done   • MAMMOGRAM  09/11/2018   • DIABETIC FOOT EXAM  11/13/2021   • DIABETIC EYE EXAM  01/14/2022   • HEMOGLOBIN A1C  05/19/2022   • LIPID PANEL  11/19/2022   • URINE MICROALBUMIN  11/19/2022   • DXA  "SCAN  08/09/2023   • TDAP/TD VACCINES (2 - Td or Tdap) 11/05/2025   • INFLUENZA VACCINE  Completed        Subjective   History of Present Illness    Wallace Dukes is a 66 y.o. female who presents for a Subsequent Wellness Visitt, as well as in follow-up in regards to her diabetes mellitus, depression, hypertension, hyperlipidemia, obesity, Gastroesophageal reflux disease, and chronic kidney disease.    Possible bladder infection  The patient notes that approximately 1 to 2 weeks prior to having her blood work drawn she felt as if she was developing a bladder infection; therefore, she obtained some \"cranberry pills\" over-the-counter and she feels that this was helpful.    Depression  She feels that her mood is doing fairly well despite some stress in her personal life.    Preventative care  The patient notes she has an appointment with her dentist in 12/2021 and she follows up every 6-months. She notes her next eye examination is scheduled for 01/2022. She no longer has mammograms. She reports her last colonoscopy was in 2018 and she is to follow-up in 10-years. The patient states she is in need of a influenza vaccine today. The patient notes she has received her COVID-19 vaccination, including the booster dose. She denies receiving a shingles vaccine. She notes that her heart rate is in the 40s intermittently. The patient adds she is unsure if she has any associated symptoms with her intermittent low heart rate. The patient notes she is more fatigued; however, she attributes this waking up at 4:15 AM to watch her infant grandchild. She denies shortness of breath, chest pain, or digestion problems.     Hip pain  She reports her right hip is painful. She localizes the pain to greater trochanter area.     CHRONIC CONDITIONS: diabetes mellitus, depression, hypertension, hyperlipidemia, obesity, Gastroesophageal reflux disease, and chronic kidney disease.    The following portions of the patient's history were " reviewed and updated as appropriate: allergies, current medications, past family history, past medical history, past social history, past surgical history and problem list.    Outpatient Medications Prior to Visit   Medication Sig Dispense Refill   • acetaminophen (TYLENOL) 325 MG tablet Take 2 tablets by mouth Every 6 (Six) Hours As Needed for Mild Pain  or Moderate Pain  (Do not exceed 4,000mg in 24 hours).     • albuterol sulfate  (90 Base) MCG/ACT inhaler Inhale 2 puffs Every 4 (Four) Hours As Needed for Wheezing. 18 g 2   • Ascorbic Acid (VITAMIN C) 500 MG capsule Take 1 capsule by mouth 2 (Two) Times a Day.     • atorvastatin (LIPITOR) 40 MG tablet TAKE 1 TABLET BY MOUTH EVERY DAY 90 tablet 1   • Calcium Carb-Cholecalciferol (CALCIUM 600-D PO) Take  by mouth 2 (Two) Times a Day.     • chlorthalidone (HYGROTON) 25 MG tablet TAKE 1 TABLET BY MOUTH EVERY DAY 90 tablet 1   • cholecalciferol (VITAMIN D3) 1000 units tablet Take 2 tablets by mouth Every Morning.     • dextromethorphan-guaifenesin (MUCINEX DM MAX STRENGTH)  MG per 12 hr tablet Take  by mouth As Needed.     • escitalopram (LEXAPRO) 10 MG tablet TAKE 1 TABLET BY MOUTH EVERY DAY 90 tablet 1   • loratadine (CLARITIN) 10 MG tablet Take 1 tablet by mouth Daily.     • losartan (COZAAR) 100 MG tablet TAKE 1 TABLET BY MOUTH EVERY DAY 90 tablet 1   • metFORMIN (GLUCOPHAGE) 1000 MG tablet TAKE 1 TABLET BY MOUTH TWICE A DAY WITH MEALS 180 tablet 3   • Multiple Vitamins-Minerals (MULTIVITAMIN ADULT PO) Take 1 tablet by mouth Daily.     • pantoprazole (PROTONIX) 40 MG EC tablet TAKE 1 TABLET BY MOUTH TWICE A  tablet 1   • azithromycin (Zithromax Z-Lizandro) 250 MG tablet Take 2 tablets the first day, then 1 tablet daily for 4 days. 6 tablet 0     No facility-administered medications prior to visit.       Patient Active Problem List   Diagnosis   • GERD (gastroesophageal reflux disease)   • Diabetic neuropathy associated with type 2 diabetes mellitus  (HCC)   • Benign essential hypertension   • Venous (peripheral) insufficiency   • Mixed hyperlipidemia   • Allergic rhinitis   • Morbid obesity (HCC)   • Right elbow pain   • Type 2 diabetes mellitus with diabetic polyneuropathy, without long-term current use of insulin (HCC)   • Major depressive disorder with single episode, in partial remission (HCC)   • Acute calculous cholecystitis   • S/P laparoscopic cholecystectomy   • Diabetic nephropathy associated with type 2 diabetes mellitus (HCC)   • Stage 3b chronic kidney disease (HCC)   • Personal history of breast cancer       Advance Care Planning:  ACP discussion was held with the patient during this visit. Patient has an advance directive (not in EMR), copy requested.    Identification of Risk Factors:  Risk factors include: Advance Directive Discussion.    Review of Systems:  Negative with the exception of right hip pain and fatigue.     Compared to one year ago, the patient feels her physical health is the same.  Compared to one year ago, the patient feels her mental health is the same.    Objective     Physical Exam  Vitals and nursing note reviewed.   Constitutional:       Appearance: She is well-developed. She is obese.   HENT:      Head: Normocephalic and atraumatic.      Right Ear: External ear normal.      Left Ear: External ear normal.      Nose: Nose normal.      Mouth/Throat:      Pharynx: No oropharyngeal exudate.   Eyes:      Conjunctiva/sclera: Conjunctivae normal.      Pupils: Pupils are equal, round, and reactive to light.   Neck:      Thyroid: No thyromegaly.      Vascular: No JVD.   Cardiovascular:      Rate and Rhythm: Normal rate and regular rhythm.      Pulses:           Dorsalis pedis pulses are 1+ on the right side and 1+ on the left side.      Heart sounds: Normal heart sounds. No murmur heard.  No friction rub. No gallop.    Pulmonary:      Effort: Pulmonary effort is normal. No respiratory distress.      Breath sounds: Normal breath  "sounds. No wheezing or rales.   Chest:      Chest wall: No tenderness.   Abdominal:      General: Bowel sounds are normal. There is no distension.      Palpations: Abdomen is soft. There is no mass.      Tenderness: There is no abdominal tenderness. There is no guarding or rebound.      Hernia: No hernia is present.      Comments: With obesity.     Musculoskeletal:         General: No tenderness. Normal range of motion.      Cervical back: Normal range of motion and neck supple.      Right foot: No deformity.      Left foot: No deformity.      Comments: Point tenderness over the right greater trochanter. Full range of motion of the right hip.    Feet:      Right foot:      Protective Sensation: 5 sites tested. 5 sites sensed.      Skin integrity: Skin integrity normal.      Left foot:      Protective Sensation: 5 sites tested. 5 sites sensed.      Skin integrity: Skin integrity normal.      Comments: Sensation in both feet intact to monofilament.     Diabetic Foot Exam Performed and Monofilament Test Performed    Lymphadenopathy:      Cervical: No cervical adenopathy.   Skin:     General: Skin is warm and dry.      Findings: No erythema or rash.   Neurological:      Mental Status: She is alert and oriented to person, place, and time.      Cranial Nerves: No cranial nerve deficit.      Sensory: No sensory deficit.      Motor: No abnormal muscle tone.      Coordination: Coordination normal.      Deep Tendon Reflexes: Reflexes normal.   Psychiatric:         Behavior: Behavior normal.         Thought Content: Thought content normal.         Judgment: Judgment normal.          Procedures     Vitals:    11/30/21 0826   BP: 130/80   BP Location: Left arm   Patient Position: Sitting   Cuff Size: Large Adult   Pulse: 88   Temp: 97.8 °F (36.6 °C)   TempSrc: Temporal   SpO2: 99%   Weight: 104 kg (229 lb 6.4 oz)   Height: 162.6 cm (64\")   PainSc: 0-No pain       Patient's Body mass index is 39.38 kg/m². indicating that she is " morbidly obese (BMI > 40 or > 35 with obesity - related health condition). Obesity-related health conditions include the following: hypertension, diabetes mellitus, dyslipidemias and GERD. Obesity is unchanged. BMI is is above average; BMI management plan is completed. We discussed portion control, increasing exercise and joining a fitness center or start home based exercise program..      Assessment/Plan   Problem List Items Addressed This Visit        Cardiac and Vasculature    Benign essential hypertension    Relevant Medications    chlorthalidone (HYGROTON) 25 MG tablet    losartan (COZAAR) 100 MG tablet    - Blood pressure is well controlled on losartan and chlorthalidone.       Mixed hyperlipidemia    Relevant Medications    atorvastatin (LIPITOR) 40 MG tablet    -  Lipids are improved, triglycerides are decreased significantly, and LDL is 123. She will continue atorvastatin and healthy diet.       Endocrine and Metabolic    Morbid obesity (HCC)       Gastrointestinal Abdominal     GERD (gastroesophageal reflux disease)    Relevant Medications    pantoprazole (PROTONIX) 40 MG EC tablet    - Controlled on omeprazole.       Genitourinary and Reproductive     Diabetic nephropathy associated with type 2 diabetes mellitus (HCC)    Relevant Medications    metFORMIN (GLUCOPHAGE) 1000 MG tablet    chlorthalidone (HYGROTON) 25 MG tablet    Stage 3b chronic kidney disease (HCC)    Relevant Medications    chlorthalidone (HYGROTON) 25 MG tablet    Other Relevant Orders    Basic Metabolic Panel    - GFR is 36. This now seems consistently in the 30s. She will repeat creatinine and GFR non-fasting and is advised to be well hydrated. If her GFR remains 40 and below we will obtain bilateral renal ultrasound and refer her to nephrology. Currently treatment consists of control of blood glucose, blood pressure, and avoidance of NSAIDs.        Mental Health    Major depressive disorder with single episode, in partial remission  (HCC)    Relevant Medications    escitalopram (LEXAPRO) 10 MG tablet    -  Mood is well controlled on Lexapro despite ongoing family stress.       Neuro    Diabetic neuropathy associated with type 2 diabetes mellitus (HCC)    Relevant Medications    metFORMIN (GLUCOPHAGE) 1000 MG tablet    -  HbA1c continues to improve now at 7.05. She will continue metformin and healthy diet.      Other Visit Diagnoses     Preventative health care    -  Primary    -  Overall she is making good progress with weight and HbA1c; however, her persistently low GFR is of concern. This is discussed below.   -  She is status post bilateral mastectomy; therefore, she is no longer in need if mammograms. Colorectal screening was performed 9/26/2018 and is due in 10-years.   -  She is fully vaccinated against COVID-19 and influenza.        Brachycardia      - She gives a history of episodes of heart rates in the 40s. She shows me several tracings from her watch and smart phone; all of which show heart rates from 70 to 90. These episodes appear to be asymptomatic. If these continue and she experiences associated symptoms she will be referred for monitoring.            Right supra trochanteric bursitis; injection was offered she declined and will let me know if he gets worse.    Patient Self-Management and Personalized Health Advice  The patient has been provided with information about: diet, exercise and weight management and preventive services including:   · Annual Wellness Visit (AWV).    Outpatient Encounter Medications as of 11/30/2021   Medication Sig Dispense Refill   • acetaminophen (TYLENOL) 325 MG tablet Take 2 tablets by mouth Every 6 (Six) Hours As Needed for Mild Pain  or Moderate Pain  (Do not exceed 4,000mg in 24 hours).     • albuterol sulfate  (90 Base) MCG/ACT inhaler Inhale 2 puffs Every 4 (Four) Hours As Needed for Wheezing. 18 g 2   • Ascorbic Acid (VITAMIN C) 500 MG capsule Take 1 capsule by mouth 2 (Two) Times a  Day.     • atorvastatin (LIPITOR) 40 MG tablet TAKE 1 TABLET BY MOUTH EVERY DAY 90 tablet 1   • Calcium Carb-Cholecalciferol (CALCIUM 600-D PO) Take  by mouth 2 (Two) Times a Day.     • chlorthalidone (HYGROTON) 25 MG tablet TAKE 1 TABLET BY MOUTH EVERY DAY 90 tablet 1   • cholecalciferol (VITAMIN D3) 1000 units tablet Take 2 tablets by mouth Every Morning.     • dextromethorphan-guaifenesin (MUCINEX DM MAX STRENGTH)  MG per 12 hr tablet Take  by mouth As Needed.     • escitalopram (LEXAPRO) 10 MG tablet TAKE 1 TABLET BY MOUTH EVERY DAY 90 tablet 1   • loratadine (CLARITIN) 10 MG tablet Take 1 tablet by mouth Daily.     • losartan (COZAAR) 100 MG tablet TAKE 1 TABLET BY MOUTH EVERY DAY 90 tablet 1   • metFORMIN (GLUCOPHAGE) 1000 MG tablet TAKE 1 TABLET BY MOUTH TWICE A DAY WITH MEALS 180 tablet 3   • Multiple Vitamins-Minerals (MULTIVITAMIN ADULT PO) Take 1 tablet by mouth Daily.     • pantoprazole (PROTONIX) 40 MG EC tablet TAKE 1 TABLET BY MOUTH TWICE A  tablet 1   • [DISCONTINUED] azithromycin (Zithromax Z-Lizandro) 250 MG tablet Take 2 tablets the first day, then 1 tablet daily for 4 days. 6 tablet 0     No facility-administered encounter medications on file as of 11/30/2021.       Reviewed use of high risk medication in the elderly: yes  Reviewed for potential of harmful drug interactions in the elderly: yes    Follow Up:  Return in about 6 months (around 5/30/2022) for follow up.     There are no Patient Instructions on file for this visit.    An After Visit Summary and PPPS with all of these plans were given to the patient.             Transcribed from ambient dictation for Domingo Sood MD by Aye Chang.  11/30/21   12:29 EST    Patient verbalized consent to the visit recording.

## 2021-12-01 ENCOUNTER — LAB (OUTPATIENT)
Dept: LAB | Facility: HOSPITAL | Age: 66
End: 2021-12-01

## 2021-12-01 DIAGNOSIS — N18.32 STAGE 3B CHRONIC KIDNEY DISEASE (HCC): ICD-10-CM

## 2021-12-01 LAB
ANION GAP SERPL CALCULATED.3IONS-SCNC: 10.9 MMOL/L (ref 5–15)
BUN SERPL-MCNC: 22 MG/DL (ref 8–23)
BUN/CREAT SERPL: 14.2 (ref 7–25)
CALCIUM SPEC-SCNC: 9.7 MG/DL (ref 8.6–10.5)
CHLORIDE SERPL-SCNC: 99 MMOL/L (ref 98–107)
CO2 SERPL-SCNC: 27.1 MMOL/L (ref 22–29)
CREAT SERPL-MCNC: 1.55 MG/DL (ref 0.57–1)
GFR SERPL CREATININE-BSD FRML MDRD: 33 ML/MIN/1.73
GLUCOSE SERPL-MCNC: 136 MG/DL (ref 65–99)
POTASSIUM SERPL-SCNC: 4.5 MMOL/L (ref 3.5–5.2)
SODIUM SERPL-SCNC: 137 MMOL/L (ref 136–145)

## 2021-12-01 PROCEDURE — 80048 BASIC METABOLIC PNL TOTAL CA: CPT

## 2021-12-02 DIAGNOSIS — N18.4 CHRONIC KIDNEY DISEASE, STAGE IV (SEVERE) (HCC): Primary | ICD-10-CM

## 2021-12-02 DIAGNOSIS — E11.21 DIABETIC NEPHROPATHY ASSOCIATED WITH TYPE 2 DIABETES MELLITUS (HCC): ICD-10-CM

## 2021-12-06 RX ORDER — AZITHROMYCIN 250 MG/1
TABLET, FILM COATED ORAL
Qty: 6 TABLET | Refills: 0 | Status: SHIPPED | OUTPATIENT
Start: 2021-12-06 | End: 2022-05-31

## 2021-12-08 ENCOUNTER — HOSPITAL ENCOUNTER (OUTPATIENT)
Dept: ULTRASOUND IMAGING | Facility: HOSPITAL | Age: 66
Discharge: HOME | End: 2021-12-08
Admitting: INTERNAL MEDICINE

## 2021-12-08 DIAGNOSIS — E11.21 DIABETIC NEPHROPATHY ASSOCIATED WITH TYPE 2 DIABETES MELLITUS (HCC): ICD-10-CM

## 2021-12-08 DIAGNOSIS — N18.4 CHRONIC KIDNEY DISEASE, STAGE IV (SEVERE) (HCC): ICD-10-CM

## 2021-12-08 PROCEDURE — 76775 US EXAM ABDO BACK WALL LIM: CPT

## 2022-02-21 RX ORDER — ATORVASTATIN CALCIUM 40 MG/1
TABLET, FILM COATED ORAL
Qty: 90 TABLET | Refills: 1 | Status: SHIPPED | OUTPATIENT
Start: 2022-02-21 | End: 2022-08-22 | Stop reason: SDUPTHER

## 2022-02-22 RX ORDER — PANTOPRAZOLE SODIUM 40 MG/1
TABLET, DELAYED RELEASE ORAL
Qty: 180 TABLET | Refills: 1 | Status: SHIPPED | OUTPATIENT
Start: 2022-02-22 | End: 2022-08-19

## 2022-02-25 ENCOUNTER — TRANSCRIBE ORDERS (OUTPATIENT)
Dept: LAB | Facility: HOSPITAL | Age: 67
End: 2022-02-25

## 2022-02-25 ENCOUNTER — LAB (OUTPATIENT)
Dept: LAB | Facility: HOSPITAL | Age: 67
End: 2022-02-25

## 2022-02-25 DIAGNOSIS — N18.32 CHRONIC KIDNEY DISEASE (CKD) STAGE G3B/A1, MODERATELY DECREASED GLOMERULAR FILTRATION RATE (GFR) BETWEEN 30-44 ML/MIN/1.73 SQUARE METER AND ALBUMINURIA CREATININE RATIO LESS THAN 30 MG/G (CMS/H*: Primary | ICD-10-CM

## 2022-02-25 DIAGNOSIS — N18.32 CHRONIC KIDNEY DISEASE (CKD) STAGE G3B/A1, MODERATELY DECREASED GLOMERULAR FILTRATION RATE (GFR) BETWEEN 30-44 ML/MIN/1.73 SQUARE METER AND ALBUMINURIA CREATININE RATIO LESS THAN 30 MG/G (CMS/H*: ICD-10-CM

## 2022-02-25 LAB
ALBUMIN SERPL-MCNC: 4.2 G/DL (ref 3.5–5.2)
ANION GAP SERPL CALCULATED.3IONS-SCNC: 16.2 MMOL/L (ref 5–15)
BACTERIA UR QL AUTO: ABNORMAL /HPF
BASOPHILS # BLD AUTO: 0.07 10*3/MM3 (ref 0–0.2)
BASOPHILS NFR BLD AUTO: 0.8 % (ref 0–1.5)
BILIRUB UR QL STRIP: NEGATIVE
BUN SERPL-MCNC: 23 MG/DL (ref 8–23)
BUN/CREAT SERPL: 15.1 (ref 7–25)
CALCIUM SPEC-SCNC: 10.1 MG/DL (ref 8.6–10.5)
CHLORIDE SERPL-SCNC: 96 MMOL/L (ref 98–107)
CLARITY UR: CLEAR
CO2 SERPL-SCNC: 24.8 MMOL/L (ref 22–29)
COLOR UR: YELLOW
CREAT SERPL-MCNC: 1.52 MG/DL (ref 0.57–1)
CREAT UR-MCNC: 56.9 MG/DL
DEPRECATED RDW RBC AUTO: 40.8 FL (ref 37–54)
EOSINOPHIL # BLD AUTO: 0.24 10*3/MM3 (ref 0–0.4)
EOSINOPHIL NFR BLD AUTO: 2.8 % (ref 0.3–6.2)
ERYTHROCYTE [DISTWIDTH] IN BLOOD BY AUTOMATED COUNT: 12.5 % (ref 12.3–15.4)
GFR SERPL CREATININE-BSD FRML MDRD: 34 ML/MIN/1.73
GLUCOSE SERPL-MCNC: 151 MG/DL (ref 65–99)
GLUCOSE UR STRIP-MCNC: NEGATIVE MG/DL
HCT VFR BLD AUTO: 36.7 % (ref 34–46.6)
HGB BLD-MCNC: 12.3 G/DL (ref 12–15.9)
HGB UR QL STRIP.AUTO: NEGATIVE
HYALINE CASTS UR QL AUTO: ABNORMAL /LPF
IMM GRANULOCYTES # BLD AUTO: 0.02 10*3/MM3 (ref 0–0.05)
IMM GRANULOCYTES NFR BLD AUTO: 0.2 % (ref 0–0.5)
KETONES UR QL STRIP: NEGATIVE
LEUKOCYTE ESTERASE UR QL STRIP.AUTO: ABNORMAL
LYMPHOCYTES # BLD AUTO: 2.43 10*3/MM3 (ref 0.7–3.1)
LYMPHOCYTES NFR BLD AUTO: 28.6 % (ref 19.6–45.3)
MCH RBC QN AUTO: 30.4 PG (ref 26.6–33)
MCHC RBC AUTO-ENTMCNC: 33.5 G/DL (ref 31.5–35.7)
MCV RBC AUTO: 90.6 FL (ref 79–97)
MONOCYTES # BLD AUTO: 0.4 10*3/MM3 (ref 0.1–0.9)
MONOCYTES NFR BLD AUTO: 4.7 % (ref 5–12)
NEUTROPHILS NFR BLD AUTO: 5.34 10*3/MM3 (ref 1.7–7)
NEUTROPHILS NFR BLD AUTO: 62.9 % (ref 42.7–76)
NITRITE UR QL STRIP: NEGATIVE
NRBC BLD AUTO-RTO: 0 /100 WBC (ref 0–0.2)
PH UR STRIP.AUTO: 6.5 [PH] (ref 5–8)
PHOSPHATE SERPL-MCNC: 3.9 MG/DL (ref 2.5–4.5)
PLATELET # BLD AUTO: 463 10*3/MM3 (ref 140–450)
PMV BLD AUTO: 9.4 FL (ref 6–12)
POTASSIUM SERPL-SCNC: 4.3 MMOL/L (ref 3.5–5.2)
PROT ?TM UR-MCNC: 5.9 MG/DL
PROT UR QL STRIP: NEGATIVE
PTH-INTACT SERPL-MCNC: 39.7 PG/ML (ref 15–65)
RBC # BLD AUTO: 4.05 10*6/MM3 (ref 3.77–5.28)
RBC # UR STRIP: ABNORMAL /HPF
REF LAB TEST METHOD: ABNORMAL
SODIUM SERPL-SCNC: 137 MMOL/L (ref 136–145)
SP GR UR STRIP: 1.01 (ref 1–1.03)
SQUAMOUS #/AREA URNS HPF: ABNORMAL /HPF
UROBILINOGEN UR QL STRIP: ABNORMAL
WBC # UR STRIP: ABNORMAL /HPF
WBC NRBC COR # BLD: 8.5 10*3/MM3 (ref 3.4–10.8)

## 2022-02-25 PROCEDURE — 80069 RENAL FUNCTION PANEL: CPT

## 2022-02-25 PROCEDURE — 83970 ASSAY OF PARATHORMONE: CPT

## 2022-02-25 PROCEDURE — 85025 COMPLETE CBC W/AUTO DIFF WBC: CPT

## 2022-02-25 PROCEDURE — 36415 COLL VENOUS BLD VENIPUNCTURE: CPT

## 2022-02-25 PROCEDURE — 84156 ASSAY OF PROTEIN URINE: CPT

## 2022-02-25 PROCEDURE — 82652 VIT D 1 25-DIHYDROXY: CPT

## 2022-02-25 PROCEDURE — 82570 ASSAY OF URINE CREATININE: CPT

## 2022-02-25 PROCEDURE — 81001 URINALYSIS AUTO W/SCOPE: CPT

## 2022-02-28 LAB — 1,25(OH)2D SERPL-MCNC: 27.7 PG/ML (ref 19.9–79.3)

## 2022-04-15 RX ORDER — CHLORTHALIDONE 25 MG/1
TABLET ORAL
Qty: 90 TABLET | Refills: 1 | Status: SHIPPED | OUTPATIENT
Start: 2022-04-15 | End: 2022-10-18 | Stop reason: SDUPTHER

## 2022-04-15 RX ORDER — LOSARTAN POTASSIUM 100 MG/1
TABLET ORAL
Qty: 90 TABLET | Refills: 1 | Status: SHIPPED | OUTPATIENT
Start: 2022-04-15 | End: 2022-10-18 | Stop reason: SDUPTHER

## 2022-05-16 RX ORDER — ESCITALOPRAM OXALATE 10 MG/1
TABLET ORAL
Qty: 90 TABLET | Refills: 1 | Status: SHIPPED | OUTPATIENT
Start: 2022-05-16 | End: 2022-11-07

## 2022-05-19 DIAGNOSIS — E78.2 MIXED HYPERLIPIDEMIA: ICD-10-CM

## 2022-05-19 DIAGNOSIS — E11.42 TYPE 2 DIABETES MELLITUS WITH DIABETIC POLYNEUROPATHY, WITHOUT LONG-TERM CURRENT USE OF INSULIN: Primary | ICD-10-CM

## 2022-05-24 ENCOUNTER — LAB (OUTPATIENT)
Dept: LAB | Facility: HOSPITAL | Age: 67
End: 2022-05-24

## 2022-05-24 DIAGNOSIS — E78.2 MIXED HYPERLIPIDEMIA: ICD-10-CM

## 2022-05-24 DIAGNOSIS — E11.42 TYPE 2 DIABETES MELLITUS WITH DIABETIC POLYNEUROPATHY, WITHOUT LONG-TERM CURRENT USE OF INSULIN: ICD-10-CM

## 2022-05-24 LAB
ALBUMIN SERPL-MCNC: 4.3 G/DL (ref 3.5–5.2)
ALBUMIN/GLOB SERPL: 1.7 G/DL
ALP SERPL-CCNC: 107 U/L (ref 39–117)
ALT SERPL W P-5'-P-CCNC: 21 U/L (ref 1–33)
ANION GAP SERPL CALCULATED.3IONS-SCNC: 14.3 MMOL/L (ref 5–15)
AST SERPL-CCNC: 22 U/L (ref 1–32)
BILIRUB SERPL-MCNC: 0.3 MG/DL (ref 0–1.2)
BUN SERPL-MCNC: 24 MG/DL (ref 8–23)
BUN/CREAT SERPL: 16.1 (ref 7–25)
CALCIUM SPEC-SCNC: 9.7 MG/DL (ref 8.6–10.5)
CHLORIDE SERPL-SCNC: 101 MMOL/L (ref 98–107)
CHOLEST SERPL-MCNC: 209 MG/DL (ref 0–200)
CO2 SERPL-SCNC: 22.7 MMOL/L (ref 22–29)
CREAT SERPL-MCNC: 1.49 MG/DL (ref 0.57–1)
EGFRCR SERPLBLD CKD-EPI 2021: 38.3 ML/MIN/1.73
GLOBULIN UR ELPH-MCNC: 2.5 GM/DL
GLUCOSE SERPL-MCNC: 124 MG/DL (ref 65–99)
HBA1C MFR BLD: 7 % (ref 4.8–5.6)
HDLC SERPL-MCNC: 48 MG/DL (ref 40–60)
LDLC SERPL CALC-MCNC: 111 MG/DL (ref 0–100)
LDLC/HDLC SERPL: 2.15 {RATIO}
POTASSIUM SERPL-SCNC: 5.1 MMOL/L (ref 3.5–5.2)
PROT SERPL-MCNC: 6.8 G/DL (ref 6–8.5)
SODIUM SERPL-SCNC: 138 MMOL/L (ref 136–145)
TRIGL SERPL-MCNC: 288 MG/DL (ref 0–150)
VLDLC SERPL-MCNC: 50 MG/DL (ref 5–40)

## 2022-05-24 PROCEDURE — 83036 HEMOGLOBIN GLYCOSYLATED A1C: CPT

## 2022-05-24 PROCEDURE — 80053 COMPREHEN METABOLIC PANEL: CPT

## 2022-05-24 PROCEDURE — 80061 LIPID PANEL: CPT

## 2022-05-31 ENCOUNTER — OFFICE VISIT (OUTPATIENT)
Dept: INTERNAL MEDICINE | Facility: CLINIC | Age: 67
End: 2022-05-31

## 2022-05-31 ENCOUNTER — TRANSCRIBE ORDERS (OUTPATIENT)
Dept: LAB | Facility: HOSPITAL | Age: 67
End: 2022-05-31

## 2022-05-31 ENCOUNTER — LAB (OUTPATIENT)
Dept: LAB | Facility: HOSPITAL | Age: 67
End: 2022-05-31

## 2022-05-31 VITALS
HEIGHT: 64 IN | TEMPERATURE: 98 F | HEART RATE: 84 BPM | WEIGHT: 226.8 LBS | DIASTOLIC BLOOD PRESSURE: 84 MMHG | SYSTOLIC BLOOD PRESSURE: 126 MMHG | BODY MASS INDEX: 38.72 KG/M2

## 2022-05-31 DIAGNOSIS — E78.2 MIXED HYPERLIPIDEMIA: ICD-10-CM

## 2022-05-31 DIAGNOSIS — I10 PRIMARY HYPERTENSION: ICD-10-CM

## 2022-05-31 DIAGNOSIS — F32.4 MAJOR DEPRESSIVE DISORDER WITH SINGLE EPISODE, IN PARTIAL REMISSION: ICD-10-CM

## 2022-05-31 DIAGNOSIS — N18.32 CHRONIC KIDNEY DISEASE (CKD) STAGE G3B/A1, MODERATELY DECREASED GLOMERULAR FILTRATION RATE (GFR) BETWEEN 30-44 ML/MIN/1.73 SQUARE METER AND ALBUMINURIA CREATININE RATIO LESS THAN 30 MG/G (CMS/H*: ICD-10-CM

## 2022-05-31 DIAGNOSIS — E11.42 TYPE 2 DIABETES MELLITUS WITH DIABETIC POLYNEUROPATHY, WITHOUT LONG-TERM CURRENT USE OF INSULIN: Primary | ICD-10-CM

## 2022-05-31 DIAGNOSIS — N18.32 STAGE 3B CHRONIC KIDNEY DISEASE: ICD-10-CM

## 2022-05-31 DIAGNOSIS — N18.32 CHRONIC KIDNEY DISEASE (CKD) STAGE G3B/A1, MODERATELY DECREASED GLOMERULAR FILTRATION RATE (GFR) BETWEEN 30-44 ML/MIN/1.73 SQUARE METER AND ALBUMINURIA CREATININE RATIO LESS THAN 30 MG/G (CMS/H*: Primary | ICD-10-CM

## 2022-05-31 DIAGNOSIS — E66.01 MORBID OBESITY: ICD-10-CM

## 2022-05-31 DIAGNOSIS — K21.9 GASTROESOPHAGEAL REFLUX DISEASE WITHOUT ESOPHAGITIS: ICD-10-CM

## 2022-05-31 LAB
ALBUMIN SERPL-MCNC: 4.4 G/DL (ref 3.5–5.2)
ANION GAP SERPL CALCULATED.3IONS-SCNC: 12 MMOL/L (ref 5–15)
BACTERIA UR QL AUTO: ABNORMAL /HPF
BASOPHILS # BLD AUTO: 0.1 10*3/MM3 (ref 0–0.2)
BASOPHILS NFR BLD AUTO: 1 % (ref 0–1.5)
BILIRUB UR QL STRIP: NEGATIVE
BUN SERPL-MCNC: 21 MG/DL (ref 8–23)
BUN/CREAT SERPL: 13.9 (ref 7–25)
CALCIUM SPEC-SCNC: 9.6 MG/DL (ref 8.6–10.5)
CHLORIDE SERPL-SCNC: 101 MMOL/L (ref 98–107)
CLARITY UR: CLEAR
CO2 SERPL-SCNC: 24 MMOL/L (ref 22–29)
COLOR UR: YELLOW
CREAT SERPL-MCNC: 1.51 MG/DL (ref 0.57–1)
DEPRECATED RDW RBC AUTO: 42.8 FL (ref 37–54)
EGFRCR SERPLBLD CKD-EPI 2021: 37.7 ML/MIN/1.73
EOSINOPHIL # BLD AUTO: 0.23 10*3/MM3 (ref 0–0.4)
EOSINOPHIL NFR BLD AUTO: 2.3 % (ref 0.3–6.2)
ERYTHROCYTE [DISTWIDTH] IN BLOOD BY AUTOMATED COUNT: 12.7 % (ref 12.3–15.4)
GLUCOSE SERPL-MCNC: 130 MG/DL (ref 65–99)
GLUCOSE UR STRIP-MCNC: NEGATIVE MG/DL
HCT VFR BLD AUTO: 37.6 % (ref 34–46.6)
HGB BLD-MCNC: 12.2 G/DL (ref 12–15.9)
HGB UR QL STRIP.AUTO: NEGATIVE
HYALINE CASTS UR QL AUTO: ABNORMAL /LPF
IMM GRANULOCYTES # BLD AUTO: 0.04 10*3/MM3 (ref 0–0.05)
IMM GRANULOCYTES NFR BLD AUTO: 0.4 % (ref 0–0.5)
KETONES UR QL STRIP: NEGATIVE
LEUKOCYTE ESTERASE UR QL STRIP.AUTO: ABNORMAL
LYMPHOCYTES # BLD AUTO: 2.17 10*3/MM3 (ref 0.7–3.1)
LYMPHOCYTES NFR BLD AUTO: 22 % (ref 19.6–45.3)
MCH RBC QN AUTO: 29.8 PG (ref 26.6–33)
MCHC RBC AUTO-ENTMCNC: 32.4 G/DL (ref 31.5–35.7)
MCV RBC AUTO: 91.7 FL (ref 79–97)
MONOCYTES # BLD AUTO: 0.52 10*3/MM3 (ref 0.1–0.9)
MONOCYTES NFR BLD AUTO: 5.3 % (ref 5–12)
NEUTROPHILS NFR BLD AUTO: 6.82 10*3/MM3 (ref 1.7–7)
NEUTROPHILS NFR BLD AUTO: 69 % (ref 42.7–76)
NITRITE UR QL STRIP: NEGATIVE
NRBC BLD AUTO-RTO: 0 /100 WBC (ref 0–0.2)
PH UR STRIP.AUTO: 5.5 [PH] (ref 5–8)
PHOSPHATE SERPL-MCNC: 3.7 MG/DL (ref 2.5–4.5)
PLATELET # BLD AUTO: 420 10*3/MM3 (ref 140–450)
PMV BLD AUTO: 9.6 FL (ref 6–12)
POTASSIUM SERPL-SCNC: 4.7 MMOL/L (ref 3.5–5.2)
PROT UR QL STRIP: NEGATIVE
RBC # BLD AUTO: 4.1 10*6/MM3 (ref 3.77–5.28)
RBC # UR STRIP: ABNORMAL /HPF
REF LAB TEST METHOD: ABNORMAL
SODIUM SERPL-SCNC: 137 MMOL/L (ref 136–145)
SP GR UR STRIP: 1.02 (ref 1–1.03)
SQUAMOUS #/AREA URNS HPF: ABNORMAL /HPF
UROBILINOGEN UR QL STRIP: ABNORMAL
WBC # UR STRIP: ABNORMAL /HPF
WBC NRBC COR # BLD: 9.88 10*3/MM3 (ref 3.4–10.8)

## 2022-05-31 PROCEDURE — 99214 OFFICE O/P EST MOD 30 MIN: CPT | Performed by: INTERNAL MEDICINE

## 2022-05-31 PROCEDURE — 85025 COMPLETE CBC W/AUTO DIFF WBC: CPT

## 2022-05-31 PROCEDURE — 80069 RENAL FUNCTION PANEL: CPT

## 2022-05-31 PROCEDURE — 81001 URINALYSIS AUTO W/SCOPE: CPT | Performed by: INTERNAL MEDICINE

## 2022-05-31 PROCEDURE — 36415 COLL VENOUS BLD VENIPUNCTURE: CPT

## 2022-06-28 ENCOUNTER — TELEPHONE (OUTPATIENT)
Dept: INTERNAL MEDICINE | Facility: CLINIC | Age: 67
End: 2022-06-28

## 2022-06-28 NOTE — TELEPHONE ENCOUNTER
Pharmacy Name: [facility]/PHARMACY #[id number] - [place] - [address] AT [address] - [phone]  - [phone]      Pharmacy representative name: SUMI    Pharmacy representative phone number:[phone]    What medication are you calling in regards to: Nirmatrelvir & Ritonavir (PAXLOVID) 20 x 150 MG & 10 x 100MG tablet      What question does the pharmacy have: DOSING CLARIFICATION     Who is the provider that prescribed the medication: DR DYSON   Additional notes: PLEASE CALL AND ADVISE

## 2022-06-28 NOTE — TELEPHONE ENCOUNTER
Called pharm and they wanted to make sure Dr. Sood wanted dose for  pt with renal insuff. Advised yes per Dr. Sood

## 2022-08-19 RX ORDER — PANTOPRAZOLE SODIUM 40 MG/1
TABLET, DELAYED RELEASE ORAL
Qty: 180 TABLET | Refills: 1 | Status: SHIPPED | OUTPATIENT
Start: 2022-08-19 | End: 2023-01-18

## 2022-08-22 RX ORDER — ATORVASTATIN CALCIUM 40 MG/1
40 TABLET, FILM COATED ORAL DAILY
Qty: 90 TABLET | Refills: 1 | Status: SHIPPED | OUTPATIENT
Start: 2022-08-22 | End: 2022-12-14

## 2022-08-22 NOTE — TELEPHONE ENCOUNTER
Caller: [id number] [place] - [address] - [phone] [place] [phone] [place]    Relationship: Pharmacy    Best call back number: [phone]    Requested Prescriptions:   Requested Prescriptions     Pending Prescriptions Disp Refills   • atorvastatin (LIPITOR) 40 MG tablet 90 tablet 1     Sig: Take 1 tablet by mouth Daily.        Pharmacy where request should be sent: [id number] [place] [facility] [address] - [phone] [place] [phone] [place]     Additional details provided by patient:   Does the patient have less than a 3 day supply:  [x] Yes  [] No    Tatiana Mcdermott   08/22/22 16:04 EDT

## 2022-09-12 ENCOUNTER — TELEPHONE (OUTPATIENT)
Dept: INTERNAL MEDICINE | Facility: CLINIC | Age: 67
End: 2022-09-12

## 2022-09-12 NOTE — TELEPHONE ENCOUNTER
PHARMACY HAS CALLED REQUESTING A CALL BACK WITH CLARIFICATION ON THE DIRECTIONS FOR PLAXLOVID.    CALL BACK NUMBER -[phone]

## 2022-09-12 NOTE — TELEPHONE ENCOUNTER
I called [facility] talked to Danyell she stated that the Paxlovid is a dose pack for normal renal function but what was sent in the directions are for decreased renal function

## 2022-10-12 ENCOUNTER — TRANSCRIBE ORDERS (OUTPATIENT)
Dept: LAB | Facility: HOSPITAL | Age: 67
End: 2022-10-12

## 2022-10-12 ENCOUNTER — LAB (OUTPATIENT)
Dept: LAB | Facility: HOSPITAL | Age: 67
End: 2022-10-12

## 2022-10-12 DIAGNOSIS — N18.32 CHRONIC KIDNEY DISEASE (CKD) STAGE G3B/A1, MODERATELY DECREASED GLOMERULAR FILTRATION RATE (GFR) BETWEEN 30-44 ML/MIN/1.73 SQUARE METER AND ALBUMINURIA CREATININE RATIO LESS THAN 30 MG/G (CMS/H*: ICD-10-CM

## 2022-10-12 DIAGNOSIS — N18.32 CHRONIC KIDNEY DISEASE (CKD) STAGE G3B/A1, MODERATELY DECREASED GLOMERULAR FILTRATION RATE (GFR) BETWEEN 30-44 ML/MIN/1.73 SQUARE METER AND ALBUMINURIA CREATININE RATIO LESS THAN 30 MG/G (CMS/H*: Primary | ICD-10-CM

## 2022-10-12 LAB
ALBUMIN SERPL-MCNC: 4.3 G/DL (ref 3.5–5.2)
ALBUMIN UR-MCNC: <1.2 MG/DL
ANION GAP SERPL CALCULATED.3IONS-SCNC: 10.8 MMOL/L (ref 5–15)
BUN SERPL-MCNC: 18 MG/DL (ref 8–23)
BUN/CREAT SERPL: 12.7 (ref 7–25)
CALCIUM SPEC-SCNC: 9.5 MG/DL (ref 8.6–10.5)
CHLORIDE SERPL-SCNC: 102 MMOL/L (ref 98–107)
CO2 SERPL-SCNC: 26.2 MMOL/L (ref 22–29)
CREAT SERPL-MCNC: 1.42 MG/DL (ref 0.57–1)
CREAT UR-MCNC: 150 MG/DL
EGFRCR SERPLBLD CKD-EPI 2021: 40.4 ML/MIN/1.73
GLUCOSE SERPL-MCNC: 139 MG/DL (ref 65–99)
PHOSPHATE SERPL-MCNC: 3.3 MG/DL (ref 2.5–4.5)
POTASSIUM SERPL-SCNC: 4.6 MMOL/L (ref 3.5–5.2)
SODIUM SERPL-SCNC: 139 MMOL/L (ref 136–145)

## 2022-10-12 PROCEDURE — 36415 COLL VENOUS BLD VENIPUNCTURE: CPT

## 2022-10-12 PROCEDURE — 82570 ASSAY OF URINE CREATININE: CPT

## 2022-10-12 PROCEDURE — 80069 RENAL FUNCTION PANEL: CPT

## 2022-10-12 PROCEDURE — 82043 UR ALBUMIN QUANTITATIVE: CPT

## 2022-10-14 ENCOUNTER — CLINICAL SUPPORT (OUTPATIENT)
Dept: INTERNAL MEDICINE | Facility: CLINIC | Age: 67
End: 2022-10-14

## 2022-10-14 DIAGNOSIS — Z23 NEED FOR INFLUENZA VACCINATION: Primary | ICD-10-CM

## 2022-10-14 PROCEDURE — 90662 IIV NO PRSV INCREASED AG IM: CPT | Performed by: INTERNAL MEDICINE

## 2022-10-14 PROCEDURE — G0008 ADMIN INFLUENZA VIRUS VAC: HCPCS | Performed by: INTERNAL MEDICINE

## 2022-10-18 RX ORDER — LOSARTAN POTASSIUM 100 MG/1
100 TABLET ORAL DAILY
Qty: 90 TABLET | Refills: 1 | Status: SHIPPED | OUTPATIENT
Start: 2022-10-18

## 2022-10-18 RX ORDER — CHLORTHALIDONE 25 MG/1
25 TABLET ORAL DAILY
Qty: 90 TABLET | Refills: 1 | Status: SHIPPED | OUTPATIENT
Start: 2022-10-18

## 2022-10-18 NOTE — TELEPHONE ENCOUNTER
Rx Refill Note  Requested Prescriptions     Pending Prescriptions Disp Refills   • chlorthalidone (HYGROTON) 25 MG tablet 90 tablet 1     Sig: Take 1 tablet by mouth Daily.   • losartan (COZAAR) 100 MG tablet 90 tablet 1     Sig: Take 1 tablet by mouth Daily.      Last office visit with prescribing clinician: 5/31/2022      Next office visit with prescribing clinician: 12/9/2022            Maryellen Ramires LPN  10/18/22, 09:47 EDT

## 2022-11-07 RX ORDER — ESCITALOPRAM OXALATE 10 MG/1
TABLET ORAL
Qty: 90 TABLET | Refills: 1 | Status: SHIPPED | OUTPATIENT
Start: 2022-11-07

## 2022-11-30 DIAGNOSIS — I10 PRIMARY HYPERTENSION: Primary | ICD-10-CM

## 2022-11-30 DIAGNOSIS — E78.2 MIXED HYPERLIPIDEMIA: ICD-10-CM

## 2022-11-30 DIAGNOSIS — E11.42 TYPE 2 DIABETES MELLITUS WITH DIABETIC POLYNEUROPATHY, WITHOUT LONG-TERM CURRENT USE OF INSULIN: ICD-10-CM

## 2022-12-02 ENCOUNTER — LAB (OUTPATIENT)
Dept: LAB | Facility: HOSPITAL | Age: 67
End: 2022-12-02

## 2022-12-02 DIAGNOSIS — E78.2 MIXED HYPERLIPIDEMIA: ICD-10-CM

## 2022-12-02 DIAGNOSIS — I10 PRIMARY HYPERTENSION: ICD-10-CM

## 2022-12-02 DIAGNOSIS — E11.42 TYPE 2 DIABETES MELLITUS WITH DIABETIC POLYNEUROPATHY, WITHOUT LONG-TERM CURRENT USE OF INSULIN: ICD-10-CM

## 2022-12-02 LAB
ALBUMIN SERPL-MCNC: 4.3 G/DL (ref 3.5–5.2)
ALBUMIN UR-MCNC: 2.4 MG/DL
ALBUMIN/GLOB SERPL: 1.5 G/DL
ALP SERPL-CCNC: 114 U/L (ref 39–117)
ALT SERPL W P-5'-P-CCNC: 16 U/L (ref 1–33)
ANION GAP SERPL CALCULATED.3IONS-SCNC: 11.2 MMOL/L (ref 5–15)
AST SERPL-CCNC: 16 U/L (ref 1–32)
BASOPHILS # BLD AUTO: 0.06 10*3/MM3 (ref 0–0.2)
BASOPHILS NFR BLD AUTO: 0.6 % (ref 0–1.5)
BILIRUB SERPL-MCNC: 0.3 MG/DL (ref 0–1.2)
BUN SERPL-MCNC: 24 MG/DL (ref 8–23)
BUN/CREAT SERPL: 15.3 (ref 7–25)
CALCIUM SPEC-SCNC: 10 MG/DL (ref 8.6–10.5)
CHLORIDE SERPL-SCNC: 101 MMOL/L (ref 98–107)
CHOLEST SERPL-MCNC: 215 MG/DL (ref 0–200)
CO2 SERPL-SCNC: 25.8 MMOL/L (ref 22–29)
CREAT SERPL-MCNC: 1.57 MG/DL (ref 0.57–1)
CREAT UR-MCNC: 121.6 MG/DL
DEPRECATED RDW RBC AUTO: 40.9 FL (ref 37–54)
EGFRCR SERPLBLD CKD-EPI 2021: 35.8 ML/MIN/1.73
EOSINOPHIL # BLD AUTO: 0.16 10*3/MM3 (ref 0–0.4)
EOSINOPHIL NFR BLD AUTO: 1.7 % (ref 0.3–6.2)
ERYTHROCYTE [DISTWIDTH] IN BLOOD BY AUTOMATED COUNT: 12.8 % (ref 12.3–15.4)
GLOBULIN UR ELPH-MCNC: 2.8 GM/DL
GLUCOSE SERPL-MCNC: 126 MG/DL (ref 65–99)
HBA1C MFR BLD: 7.3 % (ref 4.8–5.6)
HCT VFR BLD AUTO: 34.6 % (ref 34–46.6)
HDLC SERPL-MCNC: 47 MG/DL (ref 40–60)
HGB BLD-MCNC: 11.4 G/DL (ref 12–15.9)
IMM GRANULOCYTES # BLD AUTO: 0.04 10*3/MM3 (ref 0–0.05)
IMM GRANULOCYTES NFR BLD AUTO: 0.4 % (ref 0–0.5)
LDLC SERPL CALC-MCNC: 115 MG/DL (ref 0–100)
LDLC/HDLC SERPL: 2.28 {RATIO}
LYMPHOCYTES # BLD AUTO: 2.53 10*3/MM3 (ref 0.7–3.1)
LYMPHOCYTES NFR BLD AUTO: 26.6 % (ref 19.6–45.3)
MCH RBC QN AUTO: 29.3 PG (ref 26.6–33)
MCHC RBC AUTO-ENTMCNC: 32.9 G/DL (ref 31.5–35.7)
MCV RBC AUTO: 88.9 FL (ref 79–97)
MICROALBUMIN/CREAT UR: 19.7 MG/G
MONOCYTES # BLD AUTO: 0.42 10*3/MM3 (ref 0.1–0.9)
MONOCYTES NFR BLD AUTO: 4.4 % (ref 5–12)
NEUTROPHILS NFR BLD AUTO: 6.3 10*3/MM3 (ref 1.7–7)
NEUTROPHILS NFR BLD AUTO: 66.3 % (ref 42.7–76)
NRBC BLD AUTO-RTO: 0 /100 WBC (ref 0–0.2)
PLATELET # BLD AUTO: 441 10*3/MM3 (ref 140–450)
PMV BLD AUTO: 9.5 FL (ref 6–12)
POTASSIUM SERPL-SCNC: 4.8 MMOL/L (ref 3.5–5.2)
PROT SERPL-MCNC: 7.1 G/DL (ref 6–8.5)
RBC # BLD AUTO: 3.89 10*6/MM3 (ref 3.77–5.28)
SODIUM SERPL-SCNC: 138 MMOL/L (ref 136–145)
TRIGL SERPL-MCNC: 305 MG/DL (ref 0–150)
VLDLC SERPL-MCNC: 53 MG/DL (ref 5–40)
WBC NRBC COR # BLD: 9.51 10*3/MM3 (ref 3.4–10.8)

## 2022-12-02 PROCEDURE — 80061 LIPID PANEL: CPT

## 2022-12-02 PROCEDURE — 83036 HEMOGLOBIN GLYCOSYLATED A1C: CPT

## 2022-12-02 PROCEDURE — 80053 COMPREHEN METABOLIC PANEL: CPT

## 2022-12-02 PROCEDURE — 85025 COMPLETE CBC W/AUTO DIFF WBC: CPT

## 2022-12-02 PROCEDURE — 36415 COLL VENOUS BLD VENIPUNCTURE: CPT

## 2022-12-02 PROCEDURE — 82043 UR ALBUMIN QUANTITATIVE: CPT

## 2022-12-02 PROCEDURE — 82172 ASSAY OF APOLIPOPROTEIN: CPT

## 2022-12-02 PROCEDURE — 82570 ASSAY OF URINE CREATININE: CPT

## 2022-12-06 LAB — APO B SERPL-MCNC: 115 MG/DL

## 2022-12-09 ENCOUNTER — OFFICE VISIT (OUTPATIENT)
Dept: INTERNAL MEDICINE | Facility: CLINIC | Age: 67
End: 2022-12-09

## 2022-12-09 VITALS
HEART RATE: 80 BPM | HEIGHT: 64 IN | SYSTOLIC BLOOD PRESSURE: 118 MMHG | WEIGHT: 227.4 LBS | TEMPERATURE: 96.6 F | DIASTOLIC BLOOD PRESSURE: 82 MMHG | BODY MASS INDEX: 38.82 KG/M2

## 2022-12-09 DIAGNOSIS — Z00.00 PREVENTATIVE HEALTH CARE: Primary | ICD-10-CM

## 2022-12-09 DIAGNOSIS — F32.4 MAJOR DEPRESSIVE DISORDER WITH SINGLE EPISODE, IN PARTIAL REMISSION: ICD-10-CM

## 2022-12-09 DIAGNOSIS — E78.2 MIXED HYPERLIPIDEMIA: ICD-10-CM

## 2022-12-09 DIAGNOSIS — N18.32 STAGE 3B CHRONIC KIDNEY DISEASE: ICD-10-CM

## 2022-12-09 DIAGNOSIS — D64.9 ANEMIA, UNSPECIFIED TYPE: ICD-10-CM

## 2022-12-09 DIAGNOSIS — E11.21 DIABETIC NEPHROPATHY ASSOCIATED WITH TYPE 2 DIABETES MELLITUS: ICD-10-CM

## 2022-12-09 DIAGNOSIS — Z85.3 PERSONAL HISTORY OF BREAST CANCER: ICD-10-CM

## 2022-12-09 DIAGNOSIS — E11.42 DIABETIC POLYNEUROPATHY ASSOCIATED WITH TYPE 2 DIABETES MELLITUS: ICD-10-CM

## 2022-12-09 DIAGNOSIS — I10 PRIMARY HYPERTENSION: ICD-10-CM

## 2022-12-09 PROCEDURE — 1170F FXNL STATUS ASSESSED: CPT | Performed by: INTERNAL MEDICINE

## 2022-12-09 PROCEDURE — 1125F AMNT PAIN NOTED PAIN PRSNT: CPT | Performed by: INTERNAL MEDICINE

## 2022-12-09 PROCEDURE — G0439 PPPS, SUBSEQ VISIT: HCPCS | Performed by: INTERNAL MEDICINE

## 2022-12-09 PROCEDURE — 1160F RVW MEDS BY RX/DR IN RCRD: CPT | Performed by: INTERNAL MEDICINE

## 2022-12-09 NOTE — PROGRESS NOTES
[facility] Medicine Services  ADMISSION FOLLOW-UP NOTE          Patient admitted after midnight, H&P by my partner performed earlier on today's date reviewed.  Interim findings, labs, and charting also reviewed.        The [facility] Hospital Problem List has been managed and updated to include any new diagnoses:  Active Hospital Problems    Diagnosis  POA   • **Acute calculous cholecystitis [K80.00]  Yes   • Major depressive disorder with single episode, in partial remission (CMS/[facility]) [F32.4]  Yes   • Venous (peripheral) insufficiency [I87.2]  Yes   • Morbid obesity (CMS/[facility]) [E66.01]  Yes   • Mixed hyperlipidemia [E78.2]  Yes   • GERD (gastroesophageal reflux disease) [K21.9]  Yes   • Diabetic neuropathy associated with type 2 diabetes mellitus (CMS/[facility]) [E11.40]  Yes   • Benign essential hypertension [I10]  Yes      Resolved Hospital Problems   No resolved problems to display.         ADDITIONAL PLAN:  - detailed assessment and plan form admission reviewed  - patient admitted with acute cholecystitis. Dr. Angelo to take to surgery today for lap CCY. Continue current supportive care.      Electronically signed by Svetlana Singletary DO, 12/01/19, 10:56 AM.     Patient requesting refill: Lasix   Last OV: 11/11/22  Next OV: No upcoming appointment specified   Last refill: 11/11/22    Can be refilled per protocol. Patient has now changed pharmacy and moved.

## 2022-12-09 NOTE — PROGRESS NOTES
QUICK REFERENCE INFORMATION:  The ABCs of the Annual Wellness Visit    Subsequent Medicare Wellness Visit    HEALTH RISK ASSESSMENT    1955    Recent Hospitalizations:  No hospitalization(s) within the last year..        Current Medical Providers:  Patient Care Team:  Domingo Sood MD as PCP - General  Domingo Sood MD as PCP - Family Medicine        Smoking Status:  Social History     Tobacco Use   Smoking Status Never   Smokeless Tobacco Never       Alcohol Consumption:  Social History     Substance and Sexual Activity   Alcohol Use No       Depression Screen:   PHQ-2/PHQ-9 Depression Screening 12/9/2022   Retired PHQ-9 Total Score -   Retired Total Score -   Little Interest or Pleasure in Doing Things 0-->not at all   Feeling Down, Depressed or Hopeless 0-->not at all   PHQ-9: Brief Depression Severity Measure Score 0       Health Habits and Functional and Cognitive Screening:  Functional & Cognitive Status 12/9/2022   Do you have difficulty preparing food and eating? No   Do you have difficulty bathing yourself, getting dressed or grooming yourself? No   Do you have difficulty using the toilet? No   Do you have difficulty moving around from place to place? No   Do you have trouble with steps or getting out of a bed or a chair? No   Current Diet Well Balanced Diet   Dental Exam Up to date   Eye Exam Up to date   Exercise (times per week) 0 times per week   Current Exercises Include No Regular Exercise   Current Exercise Activities Include -   Do you need help using the phone?  No   Are you deaf or do you have serious difficulty hearing?  Yes   Do you need help with transportation? No   Do you need help shopping? No   Do you need help preparing meals?  No   Do you need help with housework?  No   Do you need help with laundry? No   Do you need help taking your medications? No   Do you need help managing money? No   Do you ever drive or ride in a car without wearing a seat belt? No   Have you  felt unusual stress, anger or loneliness in the last month? No   Who do you live with? Alone   If you need help, do you have trouble finding someone available to you? No   Have you been bothered in the last four weeks by sexual problems? No   Do you have difficulty concentrating, remembering or making decisions? No       Fall Risk Screen:  MARJORIE Fall Risk Assessment was completed, and patient is at LOW risk for falls.Assessment completed on:12/9/2022    ACE III MINI        Does the patient have evidence of cognitive impairment? No    Aspirin use counseling: Does not need ASA (and currently is not on it)    Recent Lab Results:  CMP:  Lab Results   Component Value Date    BUN 24 (H) 12/02/2022    CREATININE 1.57 (H) 12/02/2022    EGFRIFNONA 34 (L) 02/25/2022    BCR 15.3 12/02/2022     12/02/2022    K 4.8 12/02/2022    CO2 25.8 12/02/2022    CALCIUM 10.0 12/02/2022    ALBUMIN 4.30 12/02/2022    BILITOT 0.3 12/02/2022    ALKPHOS 114 12/02/2022    AST 16 12/02/2022    ALT 16 12/02/2022     HbA1c:  Lab Results   Component Value Date    HGBA1C 7.30 (H) 12/02/2022    HGBA1C 7.00 (H) 05/24/2022     Microalbumin:  Lab Results   Component Value Date    MICROALBUR 2.4 12/02/2022    POCMALB 10 mg/L 11/13/2020    POCCREAT 300 mg/dL 11/13/2020     Lipid Panel  Lab Results   Component Value Date    CHOL 215 (H) 12/02/2022    TRIG 305 (H) 12/02/2022    HDL 47 12/02/2022     (H) 12/02/2022    AST 16 12/02/2022    ALT 16 12/02/2022       Visual Acuity:  No results found.    Age-appropriate Screening Schedule:  Refer to the list below for future screening recommendations based on patient's age, sex and/or medical conditions. Orders for these recommended tests are listed in the plan section. The patient has been provided with a written plan.    Health Maintenance   Topic Date Due   • ZOSTER VACCINE (1 of 2) Never done   • MAMMOGRAM  02/18/2016   • DIABETIC FOOT EXAM  11/30/2022   • DIABETIC EYE EXAM  01/24/2023   •  HEMOGLOBIN A1C  06/02/2023   • DXA SCAN  08/09/2023   • LIPID PANEL  12/02/2023   • URINE MICROALBUMIN  12/02/2023   • TDAP/TD VACCINES (2 - Td or Tdap) 11/05/2025   • INFLUENZA VACCINE  Completed        Subjective   History of Present Illness    Wallace Dukes is a 67 y.o. female who presents for a Subsequent Wellness Visit and for follow-up of diabetes mellitus, hypertension, hyperlipidemia, chronic kidney disease, history of breast cancer, and depression.    Fatigue and anxiety  The patient states that she has been experiencing fatigue. She states that she is unsure if her hemoglobin is the cause of her fatigue. She states that she has been under a lot of stress lately. 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 The patient report that she has a fear that her cancer is going to return and is lingering in her body.     Chronic kidney disease  The patient notes her glomerular filtration rate decreased in 09/2022. She states that she had a bottle of water prior to her recent lab work. She states that her  glomerular filtration rate has been stable since 2018. She states that she saw her nephrologist in 09/2022 and she will see her nephrologist again in 6 months.     Health maintenance  The patient states that she has contracted COVID-19 twice.    CHRONIC CONDITIONS   Diabetes mellitus, hypertension, hyperlipidemia, chronic kidney disease, breast cancer, and depression.    The following portions of the patient's history were reviewed and updated as appropriate: allergies, current medications, past family history, past medical history, past social history, past surgical history and problem list.    Outpatient Medications Prior to Visit   Medication Sig Dispense Refill   • acetaminophen (TYLENOL) 325 MG tablet Take 2 tablets by mouth Every 6 (Six) Hours As Needed for Mild Pain  or Moderate Pain  (Do not exceed 4,000mg in 24 hours).     • albuterol sulfate  (90 Base) MCG/ACT inhaler Inhale 2 puffs Every 4  (Four) Hours As Needed for Wheezing. 18 g 2   • Ascorbic Acid (VITAMIN C) 500 MG capsule Take 1 capsule by mouth 2 (Two) Times a Day.     • atorvastatin (LIPITOR) 40 MG tablet Take 1 tablet by mouth Daily. 90 tablet 1   • Calcium Carb-Cholecalciferol (CALCIUM 600-D PO) Take  by mouth 2 (Two) Times a Day.     • chlorthalidone (HYGROTON) 25 MG tablet Take 1 tablet by mouth Daily. 90 tablet 1   • cholecalciferol (VITAMIN D3) 1000 units tablet Take 2 tablets by mouth Every Morning.     • dextromethorphan-guaifenesin (MUCINEX DM MAX STRENGTH)  MG per 12 hr tablet Take  by mouth As Needed.     • escitalopram (LEXAPRO) 10 MG tablet TAKE 1 TABLET BY MOUTH EVERY DAY 90 tablet 1   • loratadine (CLARITIN) 10 MG tablet Take 1 tablet by mouth Daily.     • losartan (COZAAR) 100 MG tablet Take 1 tablet by mouth Daily. 90 tablet 1   • metFORMIN (GLUCOPHAGE) 1000 MG tablet TAKE 1 TABLET BY MOUTH TWICE A DAY WITH MEALS 180 tablet 3   • Multiple Vitamins-Minerals (MULTIVITAMIN ADULT PO) Take 1 tablet by mouth Daily.     • pantoprazole (PROTONIX) 40 MG EC tablet TAKE 1 TABLET BY MOUTH TWICE A  tablet 1     No facility-administered medications prior to visit.       Patient Active Problem List   Diagnosis   • GERD (gastroesophageal reflux disease)   • Diabetic neuropathy associated with type 2 diabetes mellitus (HCC)   • Primary hypertension   • Venous (peripheral) insufficiency   • Mixed hyperlipidemia   • Allergic rhinitis   • Morbid obesity (HCC)   • Right elbow pain   • Type 2 diabetes mellitus with diabetic polyneuropathy, without long-term current use of insulin ([facility])   • Major depressive disorder with single episode, in partial remission ([facility])   • Acute calculous cholecystitis   • S/P laparoscopic cholecystectomy   • Diabetic nephropathy associated with type 2 diabetes mellitus (HCC)   • Stage 3b chronic kidney disease (HCC)   • Personal history of breast cancer       Advance Care Planning:  ACP discussion was held  with the patient during this visit. Patient has an advance directive (not in EMR), copy requested.    Identification of Risk Factors:  Risk factors include: Advance Directive Discussion.    Review of Systems  Review of systems is otherwise unremarkable.    Compared to one year ago, the patient feels her physical health is the same.  Compared to one year ago, the patient feels her mental health is the same.    Objective     Physical Exam  Vitals and nursing note reviewed.   Constitutional:       Appearance: She is well-developed.   HENT:      Head: Normocephalic and atraumatic.      Right Ear: External ear normal.      Left Ear: External ear normal.      Nose: Nose normal.      Mouth/Throat:      Pharynx: No oropharyngeal exudate.   Eyes:      Conjunctiva/sclera: Conjunctivae normal.      Pupils: Pupils are equal, round, and reactive to light.   Neck:      Thyroid: No thyromegaly.      Vascular: No JVD.   Cardiovascular:      Rate and Rhythm: Normal rate and regular rhythm.      Pulses:           Dorsalis pedis pulses are 1+ on the right side and 1+ on the left side.      Heart sounds: Normal heart sounds. No murmur heard.    No friction rub. No gallop.   Pulmonary:      Effort: Pulmonary effort is normal. No respiratory distress.      Breath sounds: Normal breath sounds. No wheezing or rales.   Chest:      Chest wall: No tenderness.   Abdominal:      General: Bowel sounds are normal. There is no distension.      Palpations: Abdomen is soft. There is no mass.      Tenderness: There is no abdominal tenderness. There is no guarding or rebound.      Hernia: No hernia is present.   Musculoskeletal:         General: No tenderness. Normal range of motion.      Cervical back: Normal range of motion and neck supple.      Right foot: No deformity.      Left foot: No deformity.   Feet:      Right foot:      Protective Sensation: 5 sites tested. 5 sites sensed.      Skin integrity: Skin integrity normal.      Left foot:       "Protective Sensation: 5 sites tested. 5 sites sensed.      Skin integrity: Skin integrity normal.      Comments: Sensation in both feet intact to monofilament.     Diabetic Foot Exam Performed and Monofilament Test Performed    Lymphadenopathy:      Cervical: No cervical adenopathy.   Skin:     General: Skin is warm and dry.      Findings: No erythema or rash.   Neurological:      Mental Status: She is alert and oriented to person, place, and time.      Cranial Nerves: No cranial nerve deficit.      Sensory: No sensory deficit.      Motor: No abnormal muscle tone.      Coordination: Coordination normal.      Deep Tendon Reflexes: Reflexes normal.   Psychiatric:         Behavior: Behavior normal.         Thought Content: Thought content normal.         Judgment: Judgment normal.        She is obese. Otherwise, examination is unremarkable.    Procedures     Vitals:    12/09/22 1345   BP: 118/82   BP Location: Left arm   Patient Position: Sitting   Cuff Size: Large Adult   Pulse: 80   Temp: 96.6 °F (35.9 °C)   Weight: 103 kg (227 lb 6.4 oz)   Height: 163 cm (64.17\")   PainSc:   8   PainLoc: Knee       Class 2 Severe Obesity (BMI >=35 and <=39.9). Obesity-related health conditions include the following: hypertension, diabetes mellitus and dyslipidemias. Obesity is unchanged. BMI is is above average; BMI management plan is completed. We discussed portion control, increasing exercise and joining a fitness center or start home based exercise program.      Assessment & Plan      1. Prevention  - She is fully vaccinated against influenza and COVID-19.  - She is status post bilateral mastectomies, so mammography is not indicated.  - She is up to date on colorectal cancer screening.    2. Diabetes  - Hemoglobin A1c is reasonably well controlled at 7.3 percent.  - She will continue metformin, healthy diet, and attempts at weight loss.    3. Hypertension  - Blood pressure is well controlled on chlorthalidone and losartan.    4. " Hyperlipidemia  - Lipids remain slightly elevated, but apolipoprotein B is 118 mg/dL .  - She is on 40 mg of atorvastatin.  - We will change to 40 mg of rosuvastatin.  - She is also complaining of she has been told that people tell her she is out of breath on the phone.  - She has not really noticed significant dyspnea.  - I have asked her to keep an eye on this and if there is any increase in dyspnea, would refer for stress testing.    5. Chronic kidney disease  - glomerular filtration rate is down to 35.  - She says she had a bottle of water prior.  - I would like her to repeat that and hydrate more aggressively.    6. Anemia  - Hemoglobin is slightly low.  - She is normocytic and normochromic.  - We will obtain vitamin B12 and iron levels.    7. Depression  - Despite significant stress, her mood appears to be reasonably well controlled on escitalopram.    Problem List Items Addressed This Visit        Cardiac and Vasculature    Primary hypertension    Relevant Medications    chlorthalidone (HYGROTON) 25 MG tablet    losartan (COZAAR) 100 MG tablet    Mixed hyperlipidemia    Relevant Medications    atorvastatin (LIPITOR) 40 MG tablet       Genitourinary and Reproductive     Diabetic nephropathy associated with type 2 diabetes mellitus (HCC)    Relevant Medications    metFORMIN (GLUCOPHAGE) 1000 MG tablet    chlorthalidone (HYGROTON) 25 MG tablet    Stage 3b chronic kidney disease (HCC)    Relevant Medications    chlorthalidone (HYGROTON) 25 MG tablet    Other Relevant Orders    Basic Metabolic Panel       Hematology and Neoplasia    Personal history of breast cancer       Mental Health    Major depressive disorder with single episode, in partial remission (HCC)    Relevant Medications    escitalopram (LEXAPRO) 10 MG tablet       Neuro    Diabetic neuropathy associated with type 2 diabetes mellitus (HCC)    Relevant Medications    metFORMIN (GLUCOPHAGE) 1000 MG tablet   Other Visit Diagnoses     Preventative health  care    -  Primary    Anemia, unspecified type        Relevant Orders    CBC & Differential    Vitamin B12    Iron Profile        Patient Self-Management and Personalized Health Advice  The patient has been provided with information about: diet, exercise and weight management and preventive services including:   · Annual Wellness Visit (AWV).    Outpatient Encounter Medications as of 12/9/2022   Medication Sig Dispense Refill   • acetaminophen (TYLENOL) 325 MG tablet Take 2 tablets by mouth Every 6 (Six) Hours As Needed for Mild Pain  or Moderate Pain  (Do not exceed 4,000mg in 24 hours).     • albuterol sulfate  (90 Base) MCG/ACT inhaler Inhale 2 puffs Every 4 (Four) Hours As Needed for Wheezing. 18 g 2   • Ascorbic Acid (VITAMIN C) 500 MG capsule Take 1 capsule by mouth 2 (Two) Times a Day.     • atorvastatin (LIPITOR) 40 MG tablet Take 1 tablet by mouth Daily. 90 tablet 1   • Calcium Carb-Cholecalciferol (CALCIUM 600-D PO) Take  by mouth 2 (Two) Times a Day.     • chlorthalidone (HYGROTON) 25 MG tablet Take 1 tablet by mouth Daily. 90 tablet 1   • cholecalciferol (VITAMIN D3) 1000 units tablet Take 2 tablets by mouth Every Morning.     • dextromethorphan-guaifenesin (MUCINEX DM MAX STRENGTH)  MG per 12 hr tablet Take  by mouth As Needed.     • escitalopram (LEXAPRO) 10 MG tablet TAKE 1 TABLET BY MOUTH EVERY DAY 90 tablet 1   • loratadine (CLARITIN) 10 MG tablet Take 1 tablet by mouth Daily.     • losartan (COZAAR) 100 MG tablet Take 1 tablet by mouth Daily. 90 tablet 1   • metFORMIN (GLUCOPHAGE) 1000 MG tablet TAKE 1 TABLET BY MOUTH TWICE A DAY WITH MEALS 180 tablet 3   • Multiple Vitamins-Minerals (MULTIVITAMIN ADULT PO) Take 1 tablet by mouth Daily.     • pantoprazole (PROTONIX) 40 MG EC tablet TAKE 1 TABLET BY MOUTH TWICE A  tablet 1     No facility-administered encounter medications on file as of 12/9/2022.       Reviewed use of high risk medication in the elderly: yes  Reviewed for  potential of harmful drug interactions in the elderly: yes    Follow Up:  Return in about 6 months (around 6/9/2023) for follow up.   The patient will return for a follow-up in 6 months.     There are no Patient Instructions on file for this visit.    An After Visit Summary and PPPS with all of these plans were given to the patient.      Transcribed from ambient dictation for Domingo Sood MD by Dayanna Bergman.  12/09/22   14:59 EST    Patient or patient representative verbalized consent to the visit recording.  I have personally performed the services described in this document as transcribed by the above individual, and it is both accurate and complete.

## 2022-12-12 ENCOUNTER — LAB (OUTPATIENT)
Dept: LAB | Facility: HOSPITAL | Age: 67
End: 2022-12-12

## 2022-12-12 DIAGNOSIS — D64.9 ANEMIA, UNSPECIFIED TYPE: ICD-10-CM

## 2022-12-12 DIAGNOSIS — N18.32 STAGE 3B CHRONIC KIDNEY DISEASE: ICD-10-CM

## 2022-12-12 PROCEDURE — 85025 COMPLETE CBC W/AUTO DIFF WBC: CPT

## 2022-12-12 PROCEDURE — 83540 ASSAY OF IRON: CPT

## 2022-12-12 PROCEDURE — 82607 VITAMIN B-12: CPT

## 2022-12-12 PROCEDURE — 80048 BASIC METABOLIC PNL TOTAL CA: CPT

## 2022-12-12 PROCEDURE — 84466 ASSAY OF TRANSFERRIN: CPT

## 2022-12-13 LAB
ANION GAP SERPL CALCULATED.3IONS-SCNC: 11.9 MMOL/L (ref 5–15)
BASOPHILS # BLD AUTO: 0.06 10*3/MM3 (ref 0–0.2)
BASOPHILS NFR BLD AUTO: 0.6 % (ref 0–1.5)
BUN SERPL-MCNC: 26 MG/DL (ref 8–23)
BUN/CREAT SERPL: 18.7 (ref 7–25)
CALCIUM SPEC-SCNC: 9.7 MG/DL (ref 8.6–10.5)
CHLORIDE SERPL-SCNC: 99 MMOL/L (ref 98–107)
CO2 SERPL-SCNC: 25.1 MMOL/L (ref 22–29)
CREAT SERPL-MCNC: 1.39 MG/DL (ref 0.57–1)
DEPRECATED RDW RBC AUTO: 41.9 FL (ref 37–54)
EGFRCR SERPLBLD CKD-EPI 2021: 41.4 ML/MIN/1.73
EOSINOPHIL # BLD AUTO: 0.19 10*3/MM3 (ref 0–0.4)
EOSINOPHIL NFR BLD AUTO: 2.1 % (ref 0.3–6.2)
ERYTHROCYTE [DISTWIDTH] IN BLOOD BY AUTOMATED COUNT: 13 % (ref 12.3–15.4)
GLUCOSE SERPL-MCNC: 94 MG/DL (ref 65–99)
HCT VFR BLD AUTO: 35 % (ref 34–46.6)
HGB BLD-MCNC: 11.8 G/DL (ref 12–15.9)
IMM GRANULOCYTES # BLD AUTO: 0.03 10*3/MM3 (ref 0–0.05)
IMM GRANULOCYTES NFR BLD AUTO: 0.3 % (ref 0–0.5)
IRON 24H UR-MRATE: 69 MCG/DL (ref 37–145)
IRON SATN MFR SERPL: 16 % (ref 20–50)
LYMPHOCYTES # BLD AUTO: 2.85 10*3/MM3 (ref 0.7–3.1)
LYMPHOCYTES NFR BLD AUTO: 30.8 % (ref 19.6–45.3)
MCH RBC QN AUTO: 30.2 PG (ref 26.6–33)
MCHC RBC AUTO-ENTMCNC: 33.7 G/DL (ref 31.5–35.7)
MCV RBC AUTO: 89.5 FL (ref 79–97)
MONOCYTES # BLD AUTO: 0.53 10*3/MM3 (ref 0.1–0.9)
MONOCYTES NFR BLD AUTO: 5.7 % (ref 5–12)
NEUTROPHILS NFR BLD AUTO: 5.6 10*3/MM3 (ref 1.7–7)
NEUTROPHILS NFR BLD AUTO: 60.5 % (ref 42.7–76)
NRBC BLD AUTO-RTO: 0 /100 WBC (ref 0–0.2)
PLATELET # BLD AUTO: 410 10*3/MM3 (ref 140–450)
PMV BLD AUTO: 9.3 FL (ref 6–12)
POTASSIUM SERPL-SCNC: 4.7 MMOL/L (ref 3.5–5.2)
RBC # BLD AUTO: 3.91 10*6/MM3 (ref 3.77–5.28)
SODIUM SERPL-SCNC: 136 MMOL/L (ref 136–145)
TIBC SERPL-MCNC: 438 MCG/DL (ref 298–536)
TRANSFERRIN SERPL-MCNC: 294 MG/DL (ref 200–360)
VIT B12 BLD-MCNC: 298 PG/ML (ref 211–946)
WBC NRBC COR # BLD: 9.26 10*3/MM3 (ref 3.4–10.8)

## 2022-12-14 RX ORDER — ROSUVASTATIN CALCIUM 20 MG/1
20 TABLET, COATED ORAL DAILY
Qty: 90 TABLET | Refills: 3 | Status: SHIPPED | OUTPATIENT
Start: 2022-12-14

## 2022-12-22 ENCOUNTER — TELEPHONE (OUTPATIENT)
Dept: INTERNAL MEDICINE | Facility: CLINIC | Age: 67
End: 2022-12-22

## 2022-12-22 NOTE — TELEPHONE ENCOUNTER
Caller: [facility]    Relationship:  [facility]    Best call back number: [phone]    What medication are you requestin MASTECTOMY BRAS AND 2 SILICONE BREAST PROSTHESIS 2 LEISURE BREAST PROSTHESIS. THEY ARE NEEDING THESE TWO ICD10 CODES: C50.911 AND Z90.13    Have you had these symptoms before:    [x] Yes  [] No    Have you been treated for these symptoms before:   [x] Yes  [] No    If a prescription is needed, what is your preferred pharmacy and phone number:    [address], [place] [address]- [phone]    Additional notes:THEY NEED DOCTORS NOTES PER MEDICARE AS WELL. MEDICARE REQUIRES A QUANTITY AND SPECIFIED PRESCRIPTION ORDERS. PLEASE SEND IN THE CORRECTED PRESCRIPTION.     FAX: [phone]

## 2023-01-03 DIAGNOSIS — E66.01 MORBID OBESITY: ICD-10-CM

## 2023-01-03 DIAGNOSIS — E11.42 TYPE 2 DIABETES MELLITUS WITH DIABETIC POLYNEUROPATHY, WITHOUT LONG-TERM CURRENT USE OF INSULIN: ICD-10-CM

## 2023-01-03 DIAGNOSIS — I10 PRIMARY HYPERTENSION: ICD-10-CM

## 2023-01-03 DIAGNOSIS — R06.09 DYSPNEA ON EXERTION: Primary | ICD-10-CM

## 2023-01-03 DIAGNOSIS — E11.21 DIABETIC NEPHROPATHY ASSOCIATED WITH TYPE 2 DIABETES MELLITUS: ICD-10-CM

## 2023-01-03 DIAGNOSIS — E78.2 MIXED HYPERLIPIDEMIA: ICD-10-CM

## 2023-01-18 RX ORDER — PANTOPRAZOLE SODIUM 40 MG/1
TABLET, DELAYED RELEASE ORAL
Qty: 180 TABLET | Refills: 1 | Status: SHIPPED | OUTPATIENT
Start: 2023-01-18

## 2023-01-23 ENCOUNTER — HOSPITAL ENCOUNTER (OUTPATIENT)
Dept: CARDIOLOGY | Facility: HOSPITAL | Age: 68
Discharge: HOME | End: 2023-01-23
Admitting: INTERNAL MEDICINE
Payer: MEDICARE

## 2023-01-23 VITALS
SYSTOLIC BLOOD PRESSURE: 130 MMHG | HEIGHT: 64 IN | DIASTOLIC BLOOD PRESSURE: 80 MMHG | HEART RATE: 88 BPM | BODY MASS INDEX: 38.77 KG/M2 | WEIGHT: 227.07 LBS

## 2023-01-23 DIAGNOSIS — E78.2 MIXED HYPERLIPIDEMIA: ICD-10-CM

## 2023-01-23 DIAGNOSIS — I10 PRIMARY HYPERTENSION: ICD-10-CM

## 2023-01-23 DIAGNOSIS — E11.42 TYPE 2 DIABETES MELLITUS WITH DIABETIC POLYNEUROPATHY, WITHOUT LONG-TERM CURRENT USE OF INSULIN: ICD-10-CM

## 2023-01-23 DIAGNOSIS — R06.09 DYSPNEA ON EXERTION: ICD-10-CM

## 2023-01-23 DIAGNOSIS — E11.21 DIABETIC NEPHROPATHY ASSOCIATED WITH TYPE 2 DIABETES MELLITUS: ICD-10-CM

## 2023-01-23 DIAGNOSIS — E66.01 MORBID OBESITY: ICD-10-CM

## 2023-01-23 LAB
ASCENDING AORTA: 3.1 CM
BH CV ECHO MEAS - AO ROOT DIAM: 3 CM
BH CV ECHO MEAS - EDV(CUBED): 140.6 ML
BH CV ECHO MEAS - EDV(MOD-SP2): 52.6 ML
BH CV ECHO MEAS - EDV(MOD-SP4): 59.7 ML
BH CV ECHO MEAS - EF(MOD-BP): 60.3 %
BH CV ECHO MEAS - EF(MOD-SP2): 63.1 %
BH CV ECHO MEAS - EF(MOD-SP4): 55.4 %
BH CV ECHO MEAS - ESV(CUBED): 50.7 ML
BH CV ECHO MEAS - ESV(MOD-SP2): 19.4 ML
BH CV ECHO MEAS - ESV(MOD-SP4): 26.6 ML
BH CV ECHO MEAS - FS: 28.8 %
BH CV ECHO MEAS - IVS/LVPW: 1.1 CM
BH CV ECHO MEAS - IVSD: 1.1 CM
BH CV ECHO MEAS - LA DIMENSION: 3.9 CM
BH CV ECHO MEAS - LV DIASTOLIC VOL/BSA (35-75): 29 CM2
BH CV ECHO MEAS - LV MASS(C)D: 207.3 GRAMS
BH CV ECHO MEAS - LV SYSTOLIC VOL/BSA (12-30): 12.9 CM2
BH CV ECHO MEAS - LVIDD: 5.2 CM
BH CV ECHO MEAS - LVIDS: 3.7 CM
BH CV ECHO MEAS - LVOT AREA: 2.8 CM2
BH CV ECHO MEAS - LVOT DIAM: 1.9 CM
BH CV ECHO MEAS - LVPWD: 1 CM
BH CV ECHO MEAS - SI(MOD-SP2): 16.1 ML/M2
BH CV ECHO MEAS - SI(MOD-SP4): 16.1 ML/M2
BH CV ECHO MEAS - SV(MOD-SP2): 33.2 ML
BH CV ECHO MEAS - SV(MOD-SP4): 33.1 ML
BH CV STRESS BP STAGE 1: NORMAL
BH CV STRESS DURATION MIN STAGE 1: 3
BH CV STRESS DURATION SEC STAGE 1: 5
BH CV STRESS ECHO POST STRESS EJECTION FRACTION EF: 80 %
BH CV STRESS GRADE STAGE 1: 10
BH CV STRESS HR STAGE 1: 144
BH CV STRESS METS STAGE 1: 5
BH CV STRESS O2 STAGE 1: 97
BH CV STRESS PROTOCOL 1: NORMAL
BH CV STRESS RECOVERY BP: NORMAL MMHG
BH CV STRESS RECOVERY HR: 86 BPM
BH CV STRESS RECOVERY O2: 97 %
BH CV STRESS SPEED STAGE 1: 1.7
BH CV STRESS STAGE 1: 1
BH CV VAS BP LEFT ARM: NORMAL MMHG
BH CV XLRA - RV BASE: 2.8 CM
BH CV XLRA - RV LENGTH: 6.9 CM
BH CV XLRA - RV MID: 2.4 CM
LEFT ATRIUM VOLUME INDEX: 16 ML/M2
LV EF 2D ECHO EST: 60 %
MAXIMAL PREDICTED HEART RATE: 152 BPM
PERCENT MAX PREDICTED HR: 94.74 %
STRESS BASELINE BP: NORMAL MMHG
STRESS BASELINE HR: 88 BPM
STRESS O2 SAT REST: 95 %
STRESS PERCENT HR: 111 %
STRESS POST ESTIMATED WORKLOAD: 4.6 METS
STRESS POST EXERCISE DUR MIN: 3 MIN
STRESS POST EXERCISE DUR SEC: 5 SEC
STRESS POST O2 SAT PEAK: 97 %
STRESS POST PEAK BP: NORMAL MMHG
STRESS POST PEAK HR: 144 BPM
STRESS TARGET HR: 129 BPM

## 2023-01-23 PROCEDURE — 25010000002 SULFUR HEXAFLUORIDE MICROSPH 60.7-25 MG RECONSTITUTED SUSPENSION: Performed by: INTERNAL MEDICINE

## 2023-01-23 PROCEDURE — 93018 CV STRESS TEST I&R ONLY: CPT | Performed by: INTERNAL MEDICINE

## 2023-01-23 PROCEDURE — 93352 ADMIN ECG CONTRAST AGENT: CPT | Performed by: INTERNAL MEDICINE

## 2023-01-23 PROCEDURE — 93350 STRESS TTE ONLY: CPT | Performed by: INTERNAL MEDICINE

## 2023-01-23 PROCEDURE — 93350 STRESS TTE ONLY: CPT

## 2023-01-23 PROCEDURE — 93017 CV STRESS TEST TRACING ONLY: CPT

## 2023-01-23 RX ADMIN — SULFUR HEXAFLUORIDE 5 ML: KIT at 08:45

## 2023-04-10 ENCOUNTER — LAB (OUTPATIENT)
Dept: LAB | Facility: HOSPITAL | Age: 68
End: 2023-04-10
Payer: MEDICARE

## 2023-04-10 ENCOUNTER — TRANSCRIBE ORDERS (OUTPATIENT)
Dept: LAB | Facility: HOSPITAL | Age: 68
End: 2023-04-10
Payer: COMMERCIAL

## 2023-04-10 DIAGNOSIS — N18.32 CHRONIC KIDNEY DISEASE (CKD) STAGE G3B/A1, MODERATELY DECREASED GLOMERULAR FILTRATION RATE (GFR) BETWEEN 30-44 ML/MIN/1.73 SQUARE METER AND ALBUMINURIA CREATININE RATIO LESS THAN 30 MG/G (CMS/H*: ICD-10-CM

## 2023-04-10 DIAGNOSIS — N18.32 CHRONIC KIDNEY DISEASE (CKD) STAGE G3B/A1, MODERATELY DECREASED GLOMERULAR FILTRATION RATE (GFR) BETWEEN 30-44 ML/MIN/1.73 SQUARE METER AND ALBUMINURIA CREATININE RATIO LESS THAN 30 MG/G (CMS/H*: Primary | ICD-10-CM

## 2023-04-10 LAB
ALBUMIN SERPL-MCNC: 4 G/DL (ref 3.5–5.2)
ANION GAP SERPL CALCULATED.3IONS-SCNC: 10 MMOL/L (ref 5–15)
BACTERIA UR QL AUTO: ABNORMAL /HPF
BASOPHILS # BLD AUTO: 0.05 10*3/MM3 (ref 0–0.2)
BASOPHILS NFR BLD AUTO: 0.6 % (ref 0–1.5)
BILIRUB UR QL STRIP: NEGATIVE
BUN SERPL-MCNC: 20 MG/DL (ref 8–23)
BUN/CREAT SERPL: 14.2 (ref 7–25)
CALCIUM SPEC-SCNC: 9.3 MG/DL (ref 8.6–10.5)
CHLORIDE SERPL-SCNC: 101 MMOL/L (ref 98–107)
CLARITY UR: CLEAR
CO2 SERPL-SCNC: 26 MMOL/L (ref 22–29)
COLOR UR: YELLOW
CREAT SERPL-MCNC: 1.41 MG/DL (ref 0.57–1)
CREAT UR-MCNC: 118.2 MG/DL
DEPRECATED RDW RBC AUTO: 41.2 FL (ref 37–54)
EGFRCR SERPLBLD CKD-EPI 2021: 40.7 ML/MIN/1.73
EOSINOPHIL # BLD AUTO: 0.25 10*3/MM3 (ref 0–0.4)
EOSINOPHIL NFR BLD AUTO: 2.8 % (ref 0.3–6.2)
ERYTHROCYTE [DISTWIDTH] IN BLOOD BY AUTOMATED COUNT: 12.8 % (ref 12.3–15.4)
GLUCOSE SERPL-MCNC: 160 MG/DL (ref 65–99)
GLUCOSE UR STRIP-MCNC: NEGATIVE MG/DL
HCT VFR BLD AUTO: 36 % (ref 34–46.6)
HGB BLD-MCNC: 12.1 G/DL (ref 12–15.9)
HGB UR QL STRIP.AUTO: NEGATIVE
HYALINE CASTS UR QL AUTO: ABNORMAL /LPF
IMM GRANULOCYTES # BLD AUTO: 0.04 10*3/MM3 (ref 0–0.05)
IMM GRANULOCYTES NFR BLD AUTO: 0.4 % (ref 0–0.5)
KETONES UR QL STRIP: NEGATIVE
LEUKOCYTE ESTERASE UR QL STRIP.AUTO: ABNORMAL
LYMPHOCYTES # BLD AUTO: 2.4 10*3/MM3 (ref 0.7–3.1)
LYMPHOCYTES NFR BLD AUTO: 26.4 % (ref 19.6–45.3)
MCH RBC QN AUTO: 29.9 PG (ref 26.6–33)
MCHC RBC AUTO-ENTMCNC: 33.6 G/DL (ref 31.5–35.7)
MCV RBC AUTO: 88.9 FL (ref 79–97)
MONOCYTES # BLD AUTO: 0.44 10*3/MM3 (ref 0.1–0.9)
MONOCYTES NFR BLD AUTO: 4.8 % (ref 5–12)
NEUTROPHILS NFR BLD AUTO: 5.91 10*3/MM3 (ref 1.7–7)
NEUTROPHILS NFR BLD AUTO: 65 % (ref 42.7–76)
NITRITE UR QL STRIP: NEGATIVE
NRBC BLD AUTO-RTO: 0 /100 WBC (ref 0–0.2)
PH UR STRIP.AUTO: 5.5 [PH] (ref 5–8)
PHOSPHATE SERPL-MCNC: 3.3 MG/DL (ref 2.5–4.5)
PLATELET # BLD AUTO: 459 10*3/MM3 (ref 140–450)
PMV BLD AUTO: 9.2 FL (ref 6–12)
POTASSIUM SERPL-SCNC: 4.4 MMOL/L (ref 3.5–5.2)
PROT ?TM UR-MCNC: 7.1 MG/DL
PROT UR QL STRIP: NEGATIVE
RBC # BLD AUTO: 4.05 10*6/MM3 (ref 3.77–5.28)
RBC # UR STRIP: ABNORMAL /HPF
REF LAB TEST METHOD: ABNORMAL
SODIUM SERPL-SCNC: 137 MMOL/L (ref 136–145)
SP GR UR STRIP: 1.02 (ref 1–1.03)
SQUAMOUS #/AREA URNS HPF: ABNORMAL /HPF
UROBILINOGEN UR QL STRIP: ABNORMAL
WBC # UR STRIP: ABNORMAL /HPF
WBC NRBC COR # BLD: 9.09 10*3/MM3 (ref 3.4–10.8)

## 2023-04-10 PROCEDURE — 84156 ASSAY OF PROTEIN URINE: CPT

## 2023-04-10 PROCEDURE — 80069 RENAL FUNCTION PANEL: CPT

## 2023-04-10 PROCEDURE — 36415 COLL VENOUS BLD VENIPUNCTURE: CPT

## 2023-04-10 PROCEDURE — 82570 ASSAY OF URINE CREATININE: CPT

## 2023-04-10 PROCEDURE — 85025 COMPLETE CBC W/AUTO DIFF WBC: CPT

## 2023-04-10 PROCEDURE — 81001 URINALYSIS AUTO W/SCOPE: CPT

## 2023-04-14 RX ORDER — CHLORTHALIDONE 25 MG/1
TABLET ORAL
Qty: 90 TABLET | Refills: 1 | Status: SHIPPED | OUTPATIENT
Start: 2023-04-14

## 2023-04-14 RX ORDER — LOSARTAN POTASSIUM 100 MG/1
TABLET ORAL
Qty: 90 TABLET | Refills: 1 | Status: SHIPPED | OUTPATIENT
Start: 2023-04-14

## 2023-04-14 NOTE — TELEPHONE ENCOUNTER
Rx Refill Note  Requested Prescriptions     Pending Prescriptions Disp Refills   • chlorthalidone (HYGROTON) 25 MG tablet [Pharmacy Med Name: CHLORTHALIDONE 25 MG TABLET] 90 tablet 1     Sig: TAKE 1 TABLET BY MOUTH EVERY DAY   • losartan (COZAAR) 100 MG tablet [Pharmacy Med Name: LOSARTAN POTASSIUM 100 MG TAB] 90 tablet 1     Sig: TAKE 1 TABLET BY MOUTH EVERY DAY      Last office visit with prescribing clinician: 12/9/2022   Last telemedicine visit with prescribing clinician: 6/16/2023   Next office visit with prescribing clinician: 6/16/2023                         Would you like a call back once the refill request has been completed: [] Yes [] No    If the office needs to give you a call back, can they leave a voicemail: [] Yes [] No    Rosa Crabone LPN  04/14/23, 07:53 EDT

## 2023-04-19 NOTE — OUTREACH NOTE
"TCM call completed.  See TCM flowsheet for details.  Does have upcoming hospital follow up appt with Edel Chang PA-C 12/10/19.  \"Doing well, very sore.\"  Is up and about.  Having incisional pain that she rates as 3, however does not like to take narcotics, so mostly taking Tylenol.  Eating and drinking without n/v.  Did have small BM this am and continues to pass gas.  All incisions intact without drainage or  puffiness.  Has not checked her blood pressure since home and states does not have blood glucose monitor to check blood sugars.  She will ask for one when she comes in for her hospital f/u.  Denies fever, chills, SOB, calf pain, lightheadedness/dizziness, rapid heart rate/palpitations, n/v, or chest pain.  Denies any needs at present, confirmed her hosp f/u appt and appreciated the call.     " On-site Attending supervising JOHAN (99XXX codes) On-site Attending with Resident/Fellow/Student and JOHAN (99XXX codes)

## 2023-05-09 RX ORDER — ESCITALOPRAM OXALATE 10 MG/1
TABLET ORAL
Qty: 90 TABLET | Refills: 1 | Status: SHIPPED | OUTPATIENT
Start: 2023-05-09

## 2023-06-08 DIAGNOSIS — E11.42 TYPE 2 DIABETES MELLITUS WITH DIABETIC POLYNEUROPATHY, WITHOUT LONG-TERM CURRENT USE OF INSULIN: ICD-10-CM

## 2023-06-08 DIAGNOSIS — E78.2 MIXED HYPERLIPIDEMIA: Primary | ICD-10-CM

## 2023-06-12 ENCOUNTER — LAB (OUTPATIENT)
Dept: LAB | Facility: HOSPITAL | Age: 68
End: 2023-06-12
Payer: MEDICARE

## 2023-06-12 DIAGNOSIS — E11.42 TYPE 2 DIABETES MELLITUS WITH DIABETIC POLYNEUROPATHY, WITHOUT LONG-TERM CURRENT USE OF INSULIN: ICD-10-CM

## 2023-06-12 DIAGNOSIS — E78.2 MIXED HYPERLIPIDEMIA: ICD-10-CM

## 2023-06-12 LAB
ALBUMIN SERPL-MCNC: 4.4 G/DL (ref 3.5–5.2)
ALBUMIN/GLOB SERPL: 1.6 G/DL
ALP SERPL-CCNC: 106 U/L (ref 39–117)
ALT SERPL W P-5'-P-CCNC: 17 U/L (ref 1–33)
ANION GAP SERPL CALCULATED.3IONS-SCNC: 12 MMOL/L (ref 5–15)
AST SERPL-CCNC: 20 U/L (ref 1–32)
BILIRUB SERPL-MCNC: 0.3 MG/DL (ref 0–1.2)
BUN SERPL-MCNC: 28 MG/DL (ref 8–23)
BUN/CREAT SERPL: 17.8 (ref 7–25)
CALCIUM SPEC-SCNC: 9.3 MG/DL (ref 8.6–10.5)
CHLORIDE SERPL-SCNC: 99 MMOL/L (ref 98–107)
CHOLEST SERPL-MCNC: 168 MG/DL (ref 0–200)
CO2 SERPL-SCNC: 27 MMOL/L (ref 22–29)
CREAT SERPL-MCNC: 1.57 MG/DL (ref 0.57–1)
EGFRCR SERPLBLD CKD-EPI 2021: 35.8 ML/MIN/1.73
GLOBULIN UR ELPH-MCNC: 2.8 GM/DL
GLUCOSE SERPL-MCNC: 123 MG/DL (ref 65–99)
HBA1C MFR BLD: 7.7 % (ref 4.8–5.6)
HDLC SERPL-MCNC: 55 MG/DL (ref 40–60)
LDLC SERPL CALC-MCNC: 84 MG/DL (ref 0–100)
LDLC/HDLC SERPL: 1.44 {RATIO}
POTASSIUM SERPL-SCNC: 4.4 MMOL/L (ref 3.5–5.2)
PROT SERPL-MCNC: 7.2 G/DL (ref 6–8.5)
SODIUM SERPL-SCNC: 138 MMOL/L (ref 136–145)
TRIGL SERPL-MCNC: 168 MG/DL (ref 0–150)
VLDLC SERPL-MCNC: 29 MG/DL (ref 5–40)

## 2023-06-12 PROCEDURE — 82172 ASSAY OF APOLIPOPROTEIN: CPT

## 2023-06-12 PROCEDURE — 80053 COMPREHEN METABOLIC PANEL: CPT

## 2023-06-12 PROCEDURE — 36415 COLL VENOUS BLD VENIPUNCTURE: CPT

## 2023-06-12 PROCEDURE — 83036 HEMOGLOBIN GLYCOSYLATED A1C: CPT

## 2023-06-12 PROCEDURE — 80061 LIPID PANEL: CPT

## 2023-06-14 LAB — APO B SERPL-MCNC: 93 MG/DL

## 2023-06-16 ENCOUNTER — OFFICE VISIT (OUTPATIENT)
Dept: INTERNAL MEDICINE | Facility: CLINIC | Age: 68
End: 2023-06-16
Payer: MEDICARE

## 2023-06-16 VITALS
HEART RATE: 80 BPM | WEIGHT: 230.2 LBS | SYSTOLIC BLOOD PRESSURE: 138 MMHG | BODY MASS INDEX: 39.3 KG/M2 | TEMPERATURE: 98.2 F | HEIGHT: 64 IN | DIASTOLIC BLOOD PRESSURE: 86 MMHG

## 2023-06-16 DIAGNOSIS — E66.01 MORBID OBESITY: ICD-10-CM

## 2023-06-16 DIAGNOSIS — E78.2 MIXED HYPERLIPIDEMIA: ICD-10-CM

## 2023-06-16 DIAGNOSIS — E11.42 TYPE 2 DIABETES MELLITUS WITH DIABETIC POLYNEUROPATHY, WITHOUT LONG-TERM CURRENT USE OF INSULIN: Primary | ICD-10-CM

## 2023-06-16 DIAGNOSIS — R13.19 ESOPHAGEAL DYSPHAGIA: ICD-10-CM

## 2023-06-16 DIAGNOSIS — E11.21 DIABETIC NEPHROPATHY ASSOCIATED WITH TYPE 2 DIABETES MELLITUS: ICD-10-CM

## 2023-06-16 DIAGNOSIS — F32.4 MAJOR DEPRESSIVE DISORDER WITH SINGLE EPISODE, IN PARTIAL REMISSION: ICD-10-CM

## 2023-06-16 DIAGNOSIS — N18.32 STAGE 3B CHRONIC KIDNEY DISEASE: ICD-10-CM

## 2023-06-16 DIAGNOSIS — I10 PRIMARY HYPERTENSION: ICD-10-CM

## 2023-06-16 RX ORDER — TIRZEPATIDE 2.5 MG/.5ML
2.5 INJECTION, SOLUTION SUBCUTANEOUS WEEKLY
Qty: 2 ML | Refills: 1 | Status: SHIPPED | OUTPATIENT
Start: 2023-06-16

## 2023-06-16 NOTE — PROGRESS NOTES
[facility]     Wallace Dukes  1955   [id number]      Patient Care Team:  Domingo Sood MD as PCP - General  Domingo Sood MD as PCP - Family Medicine    Chief Complaint::   Chief Complaint   Patient presents with   • Hypertension   • Hyperlipidemia   • Diabetes   • spot on forehead   • Vomiting     4 episodes in the last 3 months - sudden onset in the am        HPI    The patient comes in for follow-up of diabetes, hypertension, and hyperlipidemia.    Vomiting  she has been experiencing vomiting which there have been a couple of times that she has been in the middle of eating and will notice it not wanting to go down, then has to get up and vomit. Her first warning is nothing wants to go down and she swallows it becomes stuck. She has never had to pull off the road, but this is the first time she has ever had to do that. She takes pantoprazole.    Skin lesion  Her hairdresser noticed a spot on her scalp and was told that she needed to have it checked.    Hypertension  She feels okay physically. Her nephrologist had her monitor her blood pressure and she does not do it every day. There are some low readings that he was concerned about that, he told her she might want to change chlorthalidone. She does have an automatic cuff and she makes sure her feet are flat.    Hyperlipidemia  The rosuvastatin is working and her triglycerides were up a little bit, but the LDL is down.    Diabetes  She does not drink or smoke and does not cut her food. She loves carbohydrates and she would like to try Mounjaro.    Health maintenance  She had her COVID-19 vaccine in 12/2022 and had COVID-19 twice last year in 2022. Paxlovid did help. She had a colonoscopy.      Chronic Conditions:  Diabetes, hypertension, and hyperlipidemia.    Patient Active Problem List   Diagnosis   • GERD (gastroesophageal reflux disease)   • Diabetic neuropathy associated with type 2 diabetes mellitus   • Primary  hypertension   • Venous (peripheral) insufficiency   • Mixed hyperlipidemia   • Allergic rhinitis   • Morbid obesity   • Right elbow pain   • Type 2 diabetes mellitus with diabetic polyneuropathy, without long-term current use of insulin   • Major depressive disorder with single episode, in partial remission   • Acute calculous cholecystitis   • S/P laparoscopic cholecystectomy   • Diabetic nephropathy associated with type 2 diabetes mellitus   • Stage 3b chronic kidney disease   • Personal history of breast cancer        Past Medical History:   Diagnosis Date   • Cancer of breast 2014   • Chest pain 2009    stress nuclear   • Cholelithiasis 2019    Surgery   • Diabetes mellitus     TYPE 2   • Diarrhea     SINCE GALLBLADDER REMOVAL   • GERD (gastroesophageal reflux disease)     taking Pantoprazole   • Hernia of abdominal cavity    • Hyperlipidemia    • Hypertension    • Kidney disorder     FOLLOWING BREAST CANCER AND SURGERY HAD TO STOP MEDICATIONS SUCH AS IBUPROFEN;  CREATININE WAS ELEVATED.  NO PROBLEMS AT THIS TIME, BUT AVOIDS IBUPROFEN.   • MRSA (methicillin resistant Staphylococcus aureus)     infection of right breast expander; bilateral removal of expanders   • Obesity    • Renal insufficiency    • Schwannoma 06/2016    left translabyrinthine craniotomy   • Wears glasses     RX   • Wears hearing aid     BILATERAL       Past Surgical History:   Procedure Laterality Date   • BREAST RECONSTRUCTION, BREAST TISSUE EXPANDER INSERTION Bilateral 07/2013   • BREAST SURGERY  2014   • BREAST TISSUE EXPANDER REMOVAL INSERTION OF IMPLANT Bilateral     bilateral removal of expanders   • CHOLECYSTECTOMY N/A 12/1/2019    Procedure: CHOLECYSTECTOMY LAPAROSCOPIC;  Surgeon: Rafiq Angelo MD;  Location: [facility];  Service: General   • COLONOSCOPY  2018   • CRANIOTOMY FOR TUMOR  06/2016    Left Translabyrithine craniotomy   • HYSTERECTOMY  1986   • MASTECTOMY Bilateral 2014    complicated by MRSA wound infection on  right requiring removal of expander.   • TONSILLECTOMY     • VENTRAL/INCISIONAL HERNIA REPAIR N/A 10/19/2020    Procedure: OPEN INCISIONAL HERNIA REPAIR WITH MESH;  Surgeon: Rafiq Angelo MD;  Location: [facility];  Service: General;  Laterality: N/A;       Family History   Problem Relation Age of Onset   • Hypertension Mother    • Diabetes Mother    • Migraines Mother    • Arthritis Mother    • Thyroid disease Mother    • Diabetes Sister    • Heart disease Sister    • Hypertension Sister    • COPD Sister    • Kidney disease Sister    • Hypertension Brother    • Heart disease Brother    • Diabetes Son    • Heart disease Father    • Vision loss Maternal Grandmother        Social History     Socioeconomic History   • Marital status:    Tobacco Use   • Smoking status: Never   • Smokeless tobacco: Never   Vaping Use   • Vaping Use: Never used   Substance and Sexual Activity   • Alcohol use: No   • Drug use: No   • Sexual activity: Not Currently     Birth control/protection: None       No Known Allergies      Current Outpatient Medications:   •  acetaminophen (TYLENOL) 325 MG tablet, Take 2 tablets by mouth Every 6 (Six) Hours As Needed for Mild Pain  or Moderate Pain  (Do not exceed 4,000mg in 24 hours)., Disp: , Rfl:   •  albuterol sulfate  (90 Base) MCG/ACT inhaler, Inhale 2 puffs Every 4 (Four) Hours As Needed for Wheezing., Disp: 18 g, Rfl: 2  •  Ascorbic Acid (VITAMIN C) 500 MG capsule, Take 1 capsule by mouth 2 (Two) Times a Day., Disp: , Rfl:   •  Calcium Carb-Cholecalciferol (CALCIUM 600-D PO), Take  by mouth 2 (Two) Times a Day., Disp: , Rfl:   •  chlorthalidone (HYGROTON) 25 MG tablet, TAKE 1 TABLET BY MOUTH EVERY DAY, Disp: 90 tablet, Rfl: 1  •  cholecalciferol (VITAMIN D3) 1000 units tablet, Take 2 tablets by mouth Every Morning., Disp: , Rfl:   •  dextromethorphan-guaifenesin (MUCINEX DM MAX STRENGTH)  MG per 12 hr tablet, Take  by mouth As Needed., Disp: , Rfl:   •   "escitalopram (LEXAPRO) 10 MG tablet, TAKE 1 TABLET BY MOUTH EVERY DAY, Disp: 90 tablet, Rfl: 1  •  loratadine (CLARITIN) 10 MG tablet, Take 1 tablet by mouth Daily., Disp: , Rfl:   •  losartan (COZAAR) 100 MG tablet, TAKE 1 TABLET BY MOUTH EVERY DAY, Disp: 90 tablet, Rfl: 1  •  metFORMIN (GLUCOPHAGE) 1000 MG tablet, TAKE 1 TABLET BY MOUTH TWICE A DAY WITH MEALS, Disp: 180 tablet, Rfl: 3  •  Multiple Vitamins-Minerals (MULTIVITAMIN ADULT PO), Take 1 tablet by mouth Daily., Disp: , Rfl:   •  pantoprazole (PROTONIX) 40 MG EC tablet, TAKE 1 TABLET BY MOUTH TWICE A DAY, Disp: 180 tablet, Rfl: 1  •  rosuvastatin (Crestor) 20 MG tablet, Take 1 tablet by mouth Daily., Disp: 90 tablet, Rfl: 3  •  Tirzepatide (Mounjaro) 2.5 MG/0.5ML solution pen-injector, Inject 0.5 mL under the skin into the appropriate area as directed 1 (One) Time Per Week., Disp: 2 mL, Rfl: 1    Review of Systems     Review of systems is otherwise unremarkable.    Vital Signs  Vitals:    06/16/23 1004   BP: 138/86   BP Location: Left arm   Patient Position: Sitting   Cuff Size: Large Adult   Pulse: 80   Temp: 98.2 °F (36.8 °C)   Weight: 104 kg (230 lb 3.2 oz)   Height: 163 cm (64.17\")   PainSc: 0-No pain       Physical Exam  Vitals reviewed.   Constitutional:       Appearance: She is well-developed.   HENT:      Head: Normocephalic and atraumatic.   Cardiovascular:      Rate and Rhythm: Normal rate and regular rhythm.      Heart sounds: Normal heart sounds. No murmur heard.  Pulmonary:      Effort: Pulmonary effort is normal.      Breath sounds: Normal breath sounds.   Neurological:      Mental Status: She is alert and oriented to person, place, and time.          Physical examination, she is obese. There is a seborrheic keratosis at the left scalp. She has numerous seborrheic keratoses on her back. No suspicious lesions. Exam is otherwise normal.    Procedures    ACE III MINI             Assessment/Plan:    Diagnoses and all orders for this visit:    1. " Type 2 diabetes mellitus with diabetic polyneuropathy, without long-term current use of insulin (Primary)  -     Tirzepatide (Mounjaro) 2.5 MG/0.5ML solution pen-injector; Inject 0.5 mL under the skin into the appropriate area as directed 1 (One) Time Per Week.  Dispense: 2 mL; Refill: 1    2. Primary hypertension    3. Mixed hyperlipidemia    4. Diabetic nephropathy associated with type 2 diabetes mellitus    5. Stage 3b chronic kidney disease    6. Major depressive disorder with single episode, in partial remission    7. Morbid obesity  -     Tirzepatide (Mounjaro) 2.5 MG/0.5ML solution pen-injector; Inject 0.5 mL under the skin into the appropriate area as directed 1 (One) Time Per Week.  Dispense: 2 mL; Refill: 1    8. Esophageal dysphagia  -     Ambulatory Referral to Gastroenterology    Other orders  -     COVID-19 Bivalent (Pfizer) 12+yrs        Plan:    1. Diabetes  - Hemoglobin A1c is now 7.7 percent, which is a significant increase. She is on metformin. We discussed the importance of further carb restriction. Given a BMI of nearly 40 today, I think a trial of Mounjaro is reasonable. If her co-pay is not prohibitive, should she be able to take it, she will contact me after the fourth dose so that we can go to the next dose.    2. Hypertension  - Blood pressures have been relatively low. She will hold her chlorthalidone and follow blood pressure for the next 2 to 3 weeks. The goal is for her systolic blood pressure to be between 120 and 140 mmHg.    3. Hyperlipidemia  - Lipids are much improved on rosuvastatin. Apolipoprotein B is down to 93, which is a significant improvement.    4. Chronic kidney disease  - GFR is stable at 35.8. The treatment remains controlled of blood pressure, glucose, and avoidance of NSAIDs. She will follow up with her nephrologist.    5. Depression  - Mood is well compensated on escitalopram.    She will follow up in 6 months.      Plan of care reviewed with patient at the  conclusion of today's visit. Education was provided regarding diagnosis, management, and any prescribed or recommended OTC medications.Patient verbalizes understanding of and agreement with management plan.         Domingo Sood MD           Transcribed from ambient dictation for Domingo Sood MD by Марина Macias.  06/16/23   11:26 EDT

## 2023-07-31 ENCOUNTER — HOSPITAL ENCOUNTER (OUTPATIENT)
Dept: GENERAL RADIOLOGY | Facility: HOSPITAL | Age: 68
Discharge: HOME | End: 2023-07-31
Admitting: INTERNAL MEDICINE
Payer: MEDICARE

## 2023-07-31 ENCOUNTER — OFFICE VISIT (OUTPATIENT)
Dept: INTERNAL MEDICINE | Facility: CLINIC | Age: 68
End: 2023-07-31
Payer: MEDICARE

## 2023-07-31 VITALS
WEIGHT: 222 LBS | DIASTOLIC BLOOD PRESSURE: 78 MMHG | HEIGHT: 64 IN | TEMPERATURE: 98.4 F | HEART RATE: 92 BPM | BODY MASS INDEX: 37.9 KG/M2 | SYSTOLIC BLOOD PRESSURE: 122 MMHG

## 2023-07-31 DIAGNOSIS — M25.572 ACUTE LEFT ANKLE PAIN: ICD-10-CM

## 2023-07-31 DIAGNOSIS — M79.672 LEFT FOOT PAIN: ICD-10-CM

## 2023-07-31 DIAGNOSIS — M79.672 LEFT FOOT PAIN: Primary | ICD-10-CM

## 2023-07-31 PROCEDURE — 99213 OFFICE O/P EST LOW 20 MIN: CPT | Performed by: INTERNAL MEDICINE

## 2023-07-31 PROCEDURE — 3074F SYST BP LT 130 MM HG: CPT | Performed by: INTERNAL MEDICINE

## 2023-07-31 PROCEDURE — 73630 X-RAY EXAM OF FOOT: CPT

## 2023-07-31 PROCEDURE — 1159F MED LIST DOCD IN RCRD: CPT | Performed by: INTERNAL MEDICINE

## 2023-07-31 PROCEDURE — 73610 X-RAY EXAM OF ANKLE: CPT

## 2023-07-31 PROCEDURE — 1160F RVW MEDS BY RX/DR IN RCRD: CPT | Performed by: INTERNAL MEDICINE

## 2023-07-31 PROCEDURE — 3051F HG A1C>EQUAL 7.0%<8.0%: CPT | Performed by: INTERNAL MEDICINE

## 2023-07-31 PROCEDURE — 3078F DIAST BP <80 MM HG: CPT | Performed by: INTERNAL MEDICINE

## 2023-08-01 ENCOUNTER — PATIENT MESSAGE (OUTPATIENT)
Dept: INTERNAL MEDICINE | Facility: CLINIC | Age: 68
End: 2023-08-01
Payer: MEDICARE

## 2023-08-14 RX ORDER — TIRZEPATIDE 7.5 MG/.5ML
7.5 INJECTION, SOLUTION SUBCUTANEOUS WEEKLY
Qty: 2 ML | Refills: 1 | Status: SHIPPED | OUTPATIENT
Start: 2023-08-14

## 2023-08-16 ENCOUNTER — OUTSIDE FACILITY SERVICE (OUTPATIENT)
Dept: GASTROENTEROLOGY | Facility: CLINIC | Age: 68
End: 2023-08-16
Payer: MEDICARE

## 2023-08-16 PROCEDURE — 43249 ESOPH EGD DILATION <30 MM: CPT | Performed by: INTERNAL MEDICINE

## 2023-08-16 PROCEDURE — 43239 EGD BIOPSY SINGLE/MULTIPLE: CPT | Performed by: INTERNAL MEDICINE

## 2023-08-16 PROCEDURE — 99204 OFFICE O/P NEW MOD 45 MIN: CPT | Performed by: INTERNAL MEDICINE

## 2023-09-11 RX ORDER — TIRZEPATIDE 10 MG/.5ML
10 INJECTION, SOLUTION SUBCUTANEOUS WEEKLY
Qty: 2 ML | Refills: 2 | Status: SHIPPED | OUTPATIENT
Start: 2023-09-11

## 2023-10-09 RX ORDER — LOSARTAN POTASSIUM 100 MG/1
TABLET ORAL
Qty: 90 TABLET | Refills: 1 | Status: SHIPPED | OUTPATIENT
Start: 2023-10-09

## 2023-10-09 RX ORDER — TIRZEPATIDE 12.5 MG/.5ML
12.5 INJECTION, SOLUTION SUBCUTANEOUS WEEKLY
Qty: 2 ML | Refills: 1 | Status: SHIPPED | OUTPATIENT
Start: 2023-10-09

## 2023-11-03 RX ORDER — ESCITALOPRAM OXALATE 10 MG/1
TABLET ORAL
Qty: 90 TABLET | Refills: 1 | Status: SHIPPED | OUTPATIENT
Start: 2023-11-03

## 2023-12-08 DIAGNOSIS — I10 PRIMARY HYPERTENSION: ICD-10-CM

## 2023-12-08 DIAGNOSIS — E78.2 MIXED HYPERLIPIDEMIA: ICD-10-CM

## 2023-12-08 DIAGNOSIS — E11.42 TYPE 2 DIABETES MELLITUS WITH DIABETIC POLYNEUROPATHY, WITHOUT LONG-TERM CURRENT USE OF INSULIN: Primary | ICD-10-CM

## 2023-12-08 RX ORDER — ROSUVASTATIN CALCIUM 20 MG/1
20 TABLET, COATED ORAL DAILY
Qty: 90 TABLET | Refills: 3 | Status: SHIPPED | OUTPATIENT
Start: 2023-12-08

## 2023-12-11 ENCOUNTER — LAB (OUTPATIENT)
Dept: LAB | Facility: HOSPITAL | Age: 68
End: 2023-12-11
Payer: MEDICARE

## 2023-12-11 DIAGNOSIS — E11.42 TYPE 2 DIABETES MELLITUS WITH DIABETIC POLYNEUROPATHY, WITHOUT LONG-TERM CURRENT USE OF INSULIN: ICD-10-CM

## 2023-12-11 DIAGNOSIS — E78.2 MIXED HYPERLIPIDEMIA: ICD-10-CM

## 2023-12-11 DIAGNOSIS — I10 PRIMARY HYPERTENSION: ICD-10-CM

## 2023-12-12 LAB
ALBUMIN/CREAT UR: 11 MG/G CREAT (ref 0–29)
CREAT UR-MCNC: 349.7 MG/DL
MICROALBUMIN UR-MCNC: 38.8 UG/ML

## 2023-12-13 LAB
ALBUMIN SERPL-MCNC: 4.4 G/DL (ref 3.5–5.2)
ALBUMIN/GLOB SERPL: 2.1 G/DL
ALP SERPL-CCNC: 92 U/L (ref 39–117)
ALT SERPL-CCNC: 16 U/L (ref 1–33)
AST SERPL-CCNC: 19 U/L (ref 1–32)
BASOPHILS # BLD AUTO: 0.06 10*3/MM3 (ref 0–0.2)
BASOPHILS NFR BLD AUTO: 0.7 % (ref 0–1.5)
BILIRUB SERPL-MCNC: 0.4 MG/DL (ref 0–1.2)
BUN SERPL-MCNC: 20 MG/DL (ref 8–23)
BUN/CREAT SERPL: 11.4 (ref 7–25)
CALCIUM SERPL-MCNC: 10.1 MG/DL (ref 8.6–10.5)
CHLORIDE SERPL-SCNC: 103 MMOL/L (ref 98–107)
CHOLEST SERPL-MCNC: 147 MG/DL (ref 0–200)
CO2 SERPL-SCNC: 26.1 MMOL/L (ref 22–29)
CREAT SERPL-MCNC: 1.76 MG/DL (ref 0.57–1)
EGFRCR SERPLBLD CKD-EPI 2021: 31 ML/MIN/1.73
EOSINOPHIL # BLD AUTO: 0.14 10*3/MM3 (ref 0–0.4)
EOSINOPHIL NFR BLD AUTO: 1.7 % (ref 0.3–6.2)
ERYTHROCYTE [DISTWIDTH] IN BLOOD BY AUTOMATED COUNT: 12.9 % (ref 12.3–15.4)
GLOBULIN SER CALC-MCNC: 2.1 GM/DL
GLUCOSE SERPL-MCNC: 101 MG/DL (ref 65–99)
HBA1C MFR BLD: 5.7 % (ref 4.8–5.6)
HCT VFR BLD AUTO: 37.2 % (ref 34–46.6)
HDLC SERPL-MCNC: 46 MG/DL (ref 40–60)
HGB BLD-MCNC: 12.3 G/DL (ref 12–15.9)
IMM GRANULOCYTES # BLD AUTO: 0.03 10*3/MM3 (ref 0–0.05)
IMM GRANULOCYTES NFR BLD AUTO: 0.4 % (ref 0–0.5)
LDLC SERPL CALC-MCNC: 65 MG/DL (ref 0–100)
LYMPHOCYTES # BLD AUTO: 1.82 10*3/MM3 (ref 0.7–3.1)
LYMPHOCYTES NFR BLD AUTO: 21.5 % (ref 19.6–45.3)
MCH RBC QN AUTO: 30.1 PG (ref 26.6–33)
MCHC RBC AUTO-ENTMCNC: 33.1 G/DL (ref 31.5–35.7)
MCV RBC AUTO: 91.2 FL (ref 79–97)
MONOCYTES # BLD AUTO: 0.45 10*3/MM3 (ref 0.1–0.9)
MONOCYTES NFR BLD AUTO: 5.3 % (ref 5–12)
NEUTROPHILS # BLD AUTO: 5.98 10*3/MM3 (ref 1.7–7)
NEUTROPHILS NFR BLD AUTO: 70.4 % (ref 42.7–76)
NRBC BLD AUTO-RTO: 0 /100 WBC (ref 0–0.2)
PLATELET # BLD AUTO: 391 10*3/MM3 (ref 140–450)
POTASSIUM SERPL-SCNC: 5.2 MMOL/L (ref 3.5–5.2)
PROT SERPL-MCNC: 6.5 G/DL (ref 6–8.5)
RBC # BLD AUTO: 4.08 10*6/MM3 (ref 3.77–5.28)
SODIUM SERPL-SCNC: 141 MMOL/L (ref 136–145)
TRIGL SERPL-MCNC: 220 MG/DL (ref 0–150)
VLDLC SERPL CALC-MCNC: 36 MG/DL (ref 5–40)
WBC # BLD AUTO: 8.48 10*3/MM3 (ref 3.4–10.8)

## 2023-12-18 ENCOUNTER — OFFICE VISIT (OUTPATIENT)
Dept: INTERNAL MEDICINE | Facility: CLINIC | Age: 68
End: 2023-12-18
Payer: MEDICARE

## 2023-12-18 VITALS
DIASTOLIC BLOOD PRESSURE: 78 MMHG | SYSTOLIC BLOOD PRESSURE: 126 MMHG | HEART RATE: 76 BPM | HEIGHT: 64 IN | TEMPERATURE: 98 F | BODY MASS INDEX: 32.06 KG/M2 | WEIGHT: 187.8 LBS

## 2023-12-18 DIAGNOSIS — Z00.00 PREVENTATIVE HEALTH CARE: Primary | ICD-10-CM

## 2023-12-18 DIAGNOSIS — Z78.0 MENOPAUSE: ICD-10-CM

## 2023-12-18 DIAGNOSIS — F32.4 MAJOR DEPRESSIVE DISORDER WITH SINGLE EPISODE, IN PARTIAL REMISSION: ICD-10-CM

## 2023-12-18 DIAGNOSIS — N18.32 STAGE 3B CHRONIC KIDNEY DISEASE: ICD-10-CM

## 2023-12-18 DIAGNOSIS — E11.21 DIABETIC NEPHROPATHY ASSOCIATED WITH TYPE 2 DIABETES MELLITUS: ICD-10-CM

## 2023-12-18 DIAGNOSIS — E78.2 MIXED HYPERLIPIDEMIA: ICD-10-CM

## 2023-12-18 DIAGNOSIS — K21.9 GASTROESOPHAGEAL REFLUX DISEASE WITHOUT ESOPHAGITIS: ICD-10-CM

## 2023-12-18 DIAGNOSIS — E11.42 TYPE 2 DIABETES MELLITUS WITH DIABETIC POLYNEUROPATHY, WITHOUT LONG-TERM CURRENT USE OF INSULIN: ICD-10-CM

## 2023-12-18 DIAGNOSIS — I10 PRIMARY HYPERTENSION: ICD-10-CM

## 2023-12-18 NOTE — PROGRESS NOTES
QUICK REFERENCE INFORMATION:  The ABCs of the Annual Wellness Visit    Subsequent Medicare Wellness Visit    HEALTH RISK ASSESSMENT    1955    Recent Hospitalizations:  No hospitalization(s) within the last year..        Current Medical Providers:  Patient Care Team:  Domingo Sood MD as PCP - General  Domingo Sood MD as PCP - Family Medicine        Smoking Status:  Social History     Tobacco Use   Smoking Status Never   Smokeless Tobacco Never       Alcohol Consumption:  Social History     Substance and Sexual Activity   Alcohol Use No       Depression Screen:       2023     8:20 AM   PHQ-2/PHQ-9 Depression Screening   Little Interest or Pleasure in Doing Things 0-->not at all   Feeling Down, Depressed or Hopeless 0-->not at all   PHQ-9: Brief Depression Severity Measure Score 0       Health Habits and Functional and Cognitive Screenin/18/2023     8:15 AM   Functional & Cognitive Status   Do you have difficulty preparing food and eating? No   Do you have difficulty bathing yourself, getting dressed or grooming yourself? No   Do you have difficulty using the toilet? No   Do you have difficulty moving around from place to place? No   Do you have trouble with steps or getting out of a bed or a chair? No   Current Diet Well Balanced Diet   Dental Exam Up to date   Eye Exam Up to date   Exercise (times per week) 0 times per week   Current Exercises Include No Regular Exercise   Do you need help using the phone?  No   Are you deaf or do you have serious difficulty hearing?  Yes   Do you need help to go to places out of walking distance? No   Do you need help shopping? No   Do you need help preparing meals?  No   Do you need help with housework?  No   Do you need help with laundry? No   Do you need help taking your medications? No   Do you need help managing money? No   Do you ever drive or ride in a car without wearing a seat belt? No   Have you felt unusual stress, anger or  loneliness in the last month? No   Who do you live with? Other   If you need help, do you have trouble finding someone available to you? No   Have you been bothered in the last four weeks by sexual problems? No   Do you have difficulty concentrating, remembering or making decisions? No       Fall Risk Screen:  MARJORIE Fall Risk Assessment was completed, and patient is at MODERATE risk for falls. Assessment completed on:12/18/2023    ACE III MINI        Does the patient have evidence of cognitive impairment? No    Aspirin use counseling: Does not need ASA (and currently is not on it)    Recent Lab Results:  CMP:  Lab Results   Component Value Date    BUN 20 12/11/2023    CREATININE 1.76 (H) 12/11/2023    EGFRIFNONA 34 (L) 02/25/2022    BCR 11.4 12/11/2023     12/11/2023    K 5.2 12/11/2023    CO2 26.1 12/11/2023    CALCIUM 10.1 12/11/2023    PROTENTOTREF 6.5 12/11/2023    ALBUMIN 4.4 12/11/2023    LABGLOBREF 2.1 12/11/2023    LABIL2 2.1 12/11/2023    BILITOT 0.4 12/11/2023    ALKPHOS 92 12/11/2023    AST 19 12/11/2023    ALT 16 12/11/2023     HbA1c:  Lab Results   Component Value Date    HGBA1C 5.70 (H) 12/11/2023    HGBA1C 7.70 (H) 06/12/2023     Microalbumin:  Lab Results   Component Value Date    MICROALBUR 38.8 12/11/2023    POCMALB 10 mg/L 11/13/2020    POCCREAT 300 mg/dL 11/13/2020     Lipid Panel  Lab Results   Component Value Date    CHOL 168 06/12/2023    TRIG 220 (H) 12/11/2023    HDL 46 12/11/2023    LDL 65 12/11/2023    AST 19 12/11/2023    ALT 16 12/11/2023       Visual Acuity:  No results found.    Age-appropriate Screening Schedule:  Refer to the list below for future screening recommendations based on patient's age, sex and/or medical conditions. Orders for these recommended tests are listed in the plan section. The patient has been provided with a written plan.    Health Maintenance   Topic Date Due    DXA SCAN  08/09/2023    COVID-19 Vaccine (7 - 2023-24 season) 09/01/2023    ANNUAL WELLNESS  VISIT  12/09/2023    BMI FOLLOWUP  12/09/2023    DIABETIC EYE EXAM  04/19/2024    HEMOGLOBIN A1C  06/11/2024    LIPID PANEL  12/11/2024    URINE MICROALBUMIN  12/11/2024    DIABETIC FOOT EXAM  12/18/2024    TDAP/TD VACCINES (2 - Td or Tdap) 11/05/2025    COLORECTAL CANCER SCREENING  09/26/2028    HEPATITIS C SCREENING  Completed    INFLUENZA VACCINE  Completed    Pneumococcal Vaccine 65+  Completed    ZOSTER VACCINE  Completed        Subjective   History of Present Illness    Wallace Dukes is a 68 y.o. female who presents for a Subsequent Wellness Visit and for follow-up of diabetes, chronic kidney disease, hypertension, hyperlipidemia, GERD, and depression.    Weight loss  She has lost weight with Mounjaro. She is unsure how much longer she can continue the medication due to the cost. Her hemoglobin A1c has decreased and she has noticed improvement. She would like to continue taking the medication.    Chronic kidney disease  She cannot see her blood work in 10/2023 with [facility] on [address]. She was told that her potassium was borderline and to watch her intake with foods with potatoes. She was fasting the day of her blood work and 2 bottles of water that morning. Her GFR was 31.    Hypertension  Her strength and stamina have improved. She can now walk and go up and down steps. She denies any dyspnea or pain in her chest. She gets out and walks to the mailbox.    CHRONIC CONDITIONS: Diabetes, chronic kidney disease, hypertension, hyperlipidemia, GERD, and depression.    The following portions of the patient's history were reviewed and updated as appropriate: allergies, current medications, past family history, past medical history, past social history, past surgical history, and problem list.    Outpatient Medications Prior to Visit   Medication Sig Dispense Refill    acetaminophen (TYLENOL) 325 MG tablet Take 2 tablets by mouth Every 6 (Six) Hours As Needed for Mild Pain  or Moderate Pain  (Do not  exceed 4,000mg in 24 hours).      albuterol sulfate  (90 Base) MCG/ACT inhaler Inhale 2 puffs Every 4 (Four) Hours As Needed for Wheezing. 18 g 2    Ascorbic Acid (VITAMIN C) 500 MG capsule Take 1 capsule by mouth 2 (Two) Times a Day.      Calcium Carb-Cholecalciferol (CALCIUM 600-D PO) Take  by mouth 2 (Two) Times a Day.      cholecalciferol (VITAMIN D3) 1000 units tablet Take 2 tablets by mouth Every Morning.      escitalopram (LEXAPRO) 10 MG tablet TAKE 1 TABLET BY MOUTH EVERY DAY 90 tablet 1    loratadine (CLARITIN) 10 MG tablet Take 1 tablet by mouth Daily.      losartan (COZAAR) 100 MG tablet TAKE 1 TABLET BY MOUTH EVERY DAY 90 tablet 1    metFORMIN (GLUCOPHAGE) 1000 MG tablet TAKE 1 TABLET BY MOUTH TWICE A DAY WITH MEALS 180 tablet 3    Multiple Vitamins-Minerals (MULTIVITAMIN ADULT PO) Take 1 tablet by mouth Daily.      pantoprazole (PROTONIX) 40 MG EC tablet TAKE 1 TABLET BY MOUTH TWICE A  tablet 1    rosuvastatin (CRESTOR) 20 MG tablet TAKE 1 TABLET BY MOUTH EVERY DAY 90 tablet 3    Tirzepatide 15 MG/0.5ML solution pen-injector Inject 15 mg under the skin into the appropriate area as directed 1 (One) Time Per Week. 2 mL 5     No facility-administered medications prior to visit.       Patient Active Problem List   Diagnosis    GERD (gastroesophageal reflux disease)    Diabetic neuropathy associated with type 2 diabetes mellitus    Primary hypertension    Venous (peripheral) insufficiency    Mixed hyperlipidemia    Allergic rhinitis    Morbid obesity    Right elbow pain    Type 2 diabetes mellitus with diabetic polyneuropathy, without long-term current use of insulin    Major depressive disorder with single episode, in partial remission    Acute calculous cholecystitis    S/P laparoscopic cholecystectomy    Diabetic nephropathy associated with type 2 diabetes mellitus    Stage 3b chronic kidney disease    Personal history of breast cancer       Advance Care Planning:  ACP discussion was held  with the patient during this visit. Patient has an advance directive in EMR which is still valid.     Identification of Risk Factors:  Risk factors include: Advance Directive Discussion.    Review of Systems   Constitutional:  Negative for chills, fatigue and fever.   HENT:  Negative for congestion, ear pain and sinus pressure.    Respiratory:  Negative for cough, chest tightness, shortness of breath and wheezing.    Cardiovascular:  Negative for chest pain and palpitations.   Gastrointestinal:  Negative for abdominal pain, blood in stool and constipation.   Skin:  Negative for color change.   Allergic/Immunologic: Negative for environmental allergies.   Neurological:  Negative for dizziness, speech difficulty and headaches.   Psychiatric/Behavioral:  Negative for confusion. The patient is not nervous/anxious.        Compared to one year ago, the patient feels her physical health is the same.  Compared to one year ago, the patient feels her mental health is the same.    Objective     Physical Exam  Vitals and nursing note reviewed.   Constitutional:       Appearance: She is well-developed.   HENT:      Head: Normocephalic and atraumatic.      Right Ear: External ear normal.      Left Ear: External ear normal.      Nose: Nose normal.      Mouth/Throat:      Pharynx: No oropharyngeal exudate.   Eyes:      Conjunctiva/sclera: Conjunctivae normal.      Pupils: Pupils are equal, round, and reactive to light.   Neck:      Thyroid: No thyromegaly.      Vascular: No JVD.   Cardiovascular:      Rate and Rhythm: Normal rate and regular rhythm.      Pulses:           Dorsalis pedis pulses are 1+ on the right side and 1+ on the left side.      Heart sounds: Normal heart sounds. No murmur heard.     No friction rub. No gallop.   Pulmonary:      Effort: Pulmonary effort is normal. No respiratory distress.      Breath sounds: Normal breath sounds. No wheezing or rales.   Chest:      Chest wall: No tenderness.   Abdominal:       "General: Bowel sounds are normal. There is no distension.      Palpations: Abdomen is soft. There is no mass.      Tenderness: There is no abdominal tenderness. There is no guarding or rebound.      Hernia: No hernia is present.   Musculoskeletal:         General: No tenderness. Normal range of motion.      Cervical back: Normal range of motion and neck supple.      Right foot: No deformity.      Left foot: No deformity.   Feet:      Right foot:      Protective Sensation: 5 sites tested.  5 sites sensed.      Skin integrity: Skin integrity normal.      Left foot:      Protective Sensation: 5 sites tested.  5 sites sensed.      Skin integrity: Skin integrity normal.      Comments: Sensation in both feet intact to monofilament.     Diabetic Foot Exam Performed and Monofilament Test Performed    Lymphadenopathy:      Cervical: No cervical adenopathy.   Skin:     General: Skin is warm and dry.      Findings: No erythema or rash.   Neurological:      Mental Status: She is alert and oriented to person, place, and time.      Cranial Nerves: No cranial nerve deficit.      Sensory: No sensory deficit.      Motor: No abnormal muscle tone.      Coordination: Coordination normal.      Deep Tendon Reflexes: Reflexes normal.   Psychiatric:         Behavior: Behavior normal.         Thought Content: Thought content normal.         Judgment: Judgment normal.          Procedures     Vitals:    12/18/23 0821   BP: 126/78   BP Location: Left arm   Patient Position: Sitting   Cuff Size: Adult   Pulse: 76   Temp: 98 °F (36.7 °C)   Weight: 85.2 kg (187 lb 12.8 oz)   Height: 163 cm (64.17\")   PainSc:   4   PainLoc: Knee  Comment: left       Class 2 Severe Obesity (BMI >=35 and <=39.9). Obesity-related health conditions include the following: hypertension, diabetes mellitus, dyslipidemias, and GERD. Obesity is improving with treatment. BMI is is above average; BMI management plan is completed. We discussed portion control and increasing " exercise.      Assessment & Plan   Problem List Items Addressed This Visit       GERD (gastroesophageal reflux disease)    Relevant Medications    pantoprazole (PROTONIX) 40 MG EC tablet    Primary hypertension    Relevant Medications    losartan (COZAAR) 100 MG tablet    Mixed hyperlipidemia    Relevant Medications    rosuvastatin (CRESTOR) 20 MG tablet    Type 2 diabetes mellitus with diabetic polyneuropathy, without long-term current use of insulin    Relevant Medications    metFORMIN (GLUCOPHAGE) 1000 MG tablet    Tirzepatide 15 MG/0.5ML solution pen-injector    Major depressive disorder with single episode, in partial remission    Relevant Medications    escitalopram (LEXAPRO) 10 MG tablet    Diabetic nephropathy associated with type 2 diabetes mellitus    Relevant Medications    metFORMIN (GLUCOPHAGE) 1000 MG tablet    Tirzepatide 15 MG/0.5ML solution pen-injector    Stage 3b chronic kidney disease     Other Visit Diagnoses       Preventative health care    -  Primary    Menopause        Relevant Orders    DEXA Bone Density Axial          1. Prevention  - Overall, she is doing much better, having lost weight with Mounjaro. We discussed the importance of continuing physical activity. She is due for DEXA, which is scheduled. I recommended that she have RSV and the COVID-19 booster.    2. Diabetes  - Hemoglobin A1c is down 4 points to 5.7 percent with the help of Mounjaro. She has also lost considerable amount of weight. Unfortunately, she is concerned she may not be able to continue the drug because her insurance will change. For now, continue Mounjaro. If we had to switch to one of the other GLP-1 agonist because of insurance, we will do that.    3. Chronic kidney disease  - GFR remains low, but stable at 31. She sees nephrology regularly.    4. Hypertension  - Blood pressure is very well controlled on losartan.    5. Hyperlipidemia  - Triglycerides are up slightly, but LDL remains well controlled. ApoB is  pending. Continue rosuvastatin.    6. GERD and gastroparesis  - Continue pantoprazole.    7. Depression  - Mood remains well compensated on escitalopram.    Patient Self-Management and Personalized Health Advice  The patient has been provided with information about: diet, exercise, and weight management and preventive services including:   Annual Wellness Visit (AWV).    Outpatient Encounter Medications as of 12/18/2023   Medication Sig Dispense Refill    acetaminophen (TYLENOL) 325 MG tablet Take 2 tablets by mouth Every 6 (Six) Hours As Needed for Mild Pain  or Moderate Pain  (Do not exceed 4,000mg in 24 hours).      albuterol sulfate  (90 Base) MCG/ACT inhaler Inhale 2 puffs Every 4 (Four) Hours As Needed for Wheezing. 18 g 2    Ascorbic Acid (VITAMIN C) 500 MG capsule Take 1 capsule by mouth 2 (Two) Times a Day.      Calcium Carb-Cholecalciferol (CALCIUM 600-D PO) Take  by mouth 2 (Two) Times a Day.      cholecalciferol (VITAMIN D3) 1000 units tablet Take 2 tablets by mouth Every Morning.      escitalopram (LEXAPRO) 10 MG tablet TAKE 1 TABLET BY MOUTH EVERY DAY 90 tablet 1    loratadine (CLARITIN) 10 MG tablet Take 1 tablet by mouth Daily.      losartan (COZAAR) 100 MG tablet TAKE 1 TABLET BY MOUTH EVERY DAY 90 tablet 1    metFORMIN (GLUCOPHAGE) 1000 MG tablet TAKE 1 TABLET BY MOUTH TWICE A DAY WITH MEALS 180 tablet 3    Multiple Vitamins-Minerals (MULTIVITAMIN ADULT PO) Take 1 tablet by mouth Daily.      pantoprazole (PROTONIX) 40 MG EC tablet TAKE 1 TABLET BY MOUTH TWICE A  tablet 1    rosuvastatin (CRESTOR) 20 MG tablet TAKE 1 TABLET BY MOUTH EVERY DAY 90 tablet 3    Tirzepatide 15 MG/0.5ML solution pen-injector Inject 15 mg under the skin into the appropriate area as directed 1 (One) Time Per Week. 2 mL 5     No facility-administered encounter medications on file as of 12/18/2023.       Reviewed use of high risk medication in the elderly: yes  Reviewed for potential of harmful drug interactions  in the elderly: yes    Follow Up:  Return in about 6 months (around 6/18/2024) for follow up.     There are no Patient Instructions on file for this visit.    An After Visit Summary and PPPS with all of these plans were given to the patient.       Transcribed from ambient dictation for Domingo Sood MD by Annita Meek.  12/18/23   09:02 EST    Patient or patient representative verbalized consent to the visit recording.  I have personally performed the services described in this document as transcribed by the above individual, and it is both accurate and complete.

## 2024-01-17 ENCOUNTER — HOSPITAL ENCOUNTER (OUTPATIENT)
Dept: BONE DENSITY | Facility: HOSPITAL | Age: 69
Discharge: HOME | End: 2024-01-17
Admitting: INTERNAL MEDICINE
Payer: MEDICARE

## 2024-01-17 ENCOUNTER — PRIOR AUTHORIZATION (OUTPATIENT)
Dept: INTERNAL MEDICINE | Facility: CLINIC | Age: 69
End: 2024-01-17
Payer: MEDICARE

## 2024-01-17 DIAGNOSIS — Z78.0 MENOPAUSE: ICD-10-CM

## 2024-01-17 PROCEDURE — 77080 DXA BONE DENSITY AXIAL: CPT

## 2024-01-17 NOTE — TELEPHONE ENCOUNTER
Received PA denied for mounjaro. They denied mounjaro due to patient not trying and failing bydureon, byetta, ozempic, rybelsus, trulicity, and victoza.

## 2024-01-18 RX ORDER — SEMAGLUTIDE 0.68 MG/ML
0.25 INJECTION, SOLUTION SUBCUTANEOUS WEEKLY
Qty: 3 ML | Refills: 2 | Status: SHIPPED | OUTPATIENT
Start: 2024-01-18

## 2024-01-18 NOTE — TELEPHONE ENCOUNTER
Please let her know that I sent in ozempic because of insurance.  She has to start over at the low dose, and should call after the 4th dose so we can increase.

## 2024-02-16 RX ORDER — SEMAGLUTIDE 0.68 MG/ML
0.5 INJECTION, SOLUTION SUBCUTANEOUS WEEKLY
Qty: 3 ML | Refills: 2 | Status: SHIPPED | OUTPATIENT
Start: 2024-02-16

## 2024-03-12 RX ORDER — SEMAGLUTIDE 1.34 MG/ML
1 INJECTION, SOLUTION SUBCUTANEOUS WEEKLY
Qty: 3 ML | Refills: 1 | Status: SHIPPED | OUTPATIENT
Start: 2024-03-12

## 2024-03-22 RX ORDER — PANTOPRAZOLE SODIUM 40 MG/1
TABLET, DELAYED RELEASE ORAL
Qty: 180 TABLET | Refills: 1 | Status: SHIPPED | OUTPATIENT
Start: 2024-03-22

## 2024-04-01 RX ORDER — LOSARTAN POTASSIUM 100 MG/1
TABLET ORAL
Qty: 90 TABLET | Refills: 1 | Status: SHIPPED | OUTPATIENT
Start: 2024-04-01

## 2024-04-09 RX ORDER — SEMAGLUTIDE 2.68 MG/ML
2 INJECTION, SOLUTION SUBCUTANEOUS WEEKLY
Qty: 3 ML | Refills: 5 | Status: SHIPPED | OUTPATIENT
Start: 2024-04-09

## 2024-04-12 RX ORDER — AZITHROMYCIN 250 MG/1
TABLET, FILM COATED ORAL
Qty: 6 TABLET | Refills: 0 | Status: SHIPPED | OUTPATIENT
Start: 2024-04-12

## 2024-04-15 ENCOUNTER — TRANSCRIBE ORDERS (OUTPATIENT)
Dept: LAB | Facility: HOSPITAL | Age: 69
End: 2024-04-15
Payer: MEDICARE

## 2024-04-15 ENCOUNTER — LAB (OUTPATIENT)
Dept: LAB | Facility: HOSPITAL | Age: 69
End: 2024-04-15
Payer: MEDICARE

## 2024-04-15 DIAGNOSIS — N18.32 CHRONIC KIDNEY DISEASE (CKD) STAGE G3B/A1, MODERATELY DECREASED GLOMERULAR FILTRATION RATE (GFR) BETWEEN 30-44 ML/MIN/1.73 SQUARE METER AND ALBUMINURIA CREATININE RATIO LESS THAN 30 MG/G (CMS/H*: Primary | ICD-10-CM

## 2024-04-15 DIAGNOSIS — N18.32 CHRONIC KIDNEY DISEASE (CKD) STAGE G3B/A1, MODERATELY DECREASED GLOMERULAR FILTRATION RATE (GFR) BETWEEN 30-44 ML/MIN/1.73 SQUARE METER AND ALBUMINURIA CREATININE RATIO LESS THAN 30 MG/G (CMS/H*: ICD-10-CM

## 2024-04-15 PROCEDURE — 80048 BASIC METABOLIC PNL TOTAL CA: CPT

## 2024-04-15 PROCEDURE — 81001 URINALYSIS AUTO W/SCOPE: CPT

## 2024-04-15 PROCEDURE — 36415 COLL VENOUS BLD VENIPUNCTURE: CPT

## 2024-04-16 LAB
ANION GAP SERPL CALCULATED.3IONS-SCNC: 9 MMOL/L (ref 5–15)
BACTERIA UR QL AUTO: ABNORMAL /HPF
BILIRUB UR QL STRIP: NEGATIVE
BUN SERPL-MCNC: 17 MG/DL (ref 8–23)
BUN/CREAT SERPL: 11 (ref 7–25)
CALCIUM SPEC-SCNC: 9.6 MG/DL (ref 8.6–10.5)
CHLORIDE SERPL-SCNC: 102 MMOL/L (ref 98–107)
CLARITY UR: CLEAR
CO2 SERPL-SCNC: 25 MMOL/L (ref 22–29)
COLOR UR: YELLOW
CREAT SERPL-MCNC: 1.54 MG/DL (ref 0.57–1)
EGFRCR SERPLBLD CKD-EPI 2021: 36.4 ML/MIN/1.73
GLUCOSE SERPL-MCNC: 89 MG/DL (ref 65–99)
GLUCOSE UR STRIP-MCNC: NEGATIVE MG/DL
HGB UR QL STRIP.AUTO: NEGATIVE
HYALINE CASTS UR QL AUTO: ABNORMAL /LPF
KETONES UR QL STRIP: NEGATIVE
LEUKOCYTE ESTERASE UR QL STRIP.AUTO: NEGATIVE
NITRITE UR QL STRIP: NEGATIVE
PH UR STRIP.AUTO: 5.5 [PH] (ref 5–8)
POTASSIUM SERPL-SCNC: 4.6 MMOL/L (ref 3.5–5.2)
PROT UR QL STRIP: ABNORMAL
RBC # UR STRIP: ABNORMAL /HPF
REF LAB TEST METHOD: ABNORMAL
SODIUM SERPL-SCNC: 136 MMOL/L (ref 136–145)
SP GR UR STRIP: 1.02 (ref 1–1.03)
SQUAMOUS #/AREA URNS HPF: ABNORMAL /HPF
UROBILINOGEN UR QL STRIP: ABNORMAL
WBC # UR STRIP: ABNORMAL /HPF

## 2024-05-03 RX ORDER — ESCITALOPRAM OXALATE 10 MG/1
TABLET ORAL
Qty: 90 TABLET | Refills: 1 | Status: SHIPPED | OUTPATIENT
Start: 2024-05-03

## 2024-06-10 DIAGNOSIS — I10 PRIMARY HYPERTENSION: ICD-10-CM

## 2024-06-10 DIAGNOSIS — E78.2 MIXED HYPERLIPIDEMIA: ICD-10-CM

## 2024-06-10 DIAGNOSIS — E11.42 DIABETIC POLYNEUROPATHY ASSOCIATED WITH TYPE 2 DIABETES MELLITUS: Primary | ICD-10-CM

## 2024-06-13 ENCOUNTER — LAB (OUTPATIENT)
Dept: LAB | Facility: HOSPITAL | Age: 69
End: 2024-06-13
Payer: MEDICARE

## 2024-06-13 DIAGNOSIS — E11.42 DIABETIC POLYNEUROPATHY ASSOCIATED WITH TYPE 2 DIABETES MELLITUS: ICD-10-CM

## 2024-06-13 DIAGNOSIS — E78.2 MIXED HYPERLIPIDEMIA: ICD-10-CM

## 2024-06-13 LAB
ALBUMIN SERPL-MCNC: 4.1 G/DL (ref 3.5–5.2)
ALBUMIN/GLOB SERPL: 1.5 G/DL
ALP SERPL-CCNC: 86 U/L (ref 39–117)
ALT SERPL W P-5'-P-CCNC: 14 U/L (ref 1–33)
ANION GAP SERPL CALCULATED.3IONS-SCNC: 11.3 MMOL/L (ref 5–15)
AST SERPL-CCNC: 17 U/L (ref 1–32)
BILIRUB SERPL-MCNC: 0.4 MG/DL (ref 0–1.2)
BUN SERPL-MCNC: 21 MG/DL (ref 8–23)
BUN/CREAT SERPL: 16.9 (ref 7–25)
CALCIUM SPEC-SCNC: 9.9 MG/DL (ref 8.6–10.5)
CHLORIDE SERPL-SCNC: 103 MMOL/L (ref 98–107)
CHOLEST SERPL-MCNC: 149 MG/DL (ref 0–200)
CO2 SERPL-SCNC: 24.7 MMOL/L (ref 22–29)
CREAT SERPL-MCNC: 1.24 MG/DL (ref 0.57–1)
EGFRCR SERPLBLD CKD-EPI 2021: 47.2 ML/MIN/1.73
GLOBULIN UR ELPH-MCNC: 2.7 GM/DL
GLUCOSE SERPL-MCNC: 81 MG/DL (ref 65–99)
HBA1C MFR BLD: 5.7 % (ref 4.8–5.6)
HDLC SERPL-MCNC: 55 MG/DL (ref 40–60)
LDLC SERPL CALC-MCNC: 73 MG/DL (ref 0–100)
LDLC/HDLC SERPL: 1.28 {RATIO}
POTASSIUM SERPL-SCNC: 4.9 MMOL/L (ref 3.5–5.2)
PROT SERPL-MCNC: 6.8 G/DL (ref 6–8.5)
SODIUM SERPL-SCNC: 139 MMOL/L (ref 136–145)
TRIGL SERPL-MCNC: 119 MG/DL (ref 0–150)
VLDLC SERPL-MCNC: 21 MG/DL (ref 5–40)

## 2024-06-13 PROCEDURE — 83036 HEMOGLOBIN GLYCOSYLATED A1C: CPT

## 2024-06-13 PROCEDURE — 80061 LIPID PANEL: CPT

## 2024-06-13 PROCEDURE — 36415 COLL VENOUS BLD VENIPUNCTURE: CPT

## 2024-06-13 PROCEDURE — 80053 COMPREHEN METABOLIC PANEL: CPT

## 2024-06-13 PROCEDURE — 82172 ASSAY OF APOLIPOPROTEIN: CPT

## 2024-06-16 LAB — APO B SERPL-MCNC: 84 MG/DL

## 2024-06-19 ENCOUNTER — OFFICE VISIT (OUTPATIENT)
Dept: INTERNAL MEDICINE | Facility: CLINIC | Age: 69
End: 2024-06-19
Payer: MEDICARE

## 2024-06-19 VITALS
OXYGEN SATURATION: 96 % | HEIGHT: 64 IN | SYSTOLIC BLOOD PRESSURE: 130 MMHG | HEART RATE: 82 BPM | TEMPERATURE: 98.2 F | WEIGHT: 175 LBS | DIASTOLIC BLOOD PRESSURE: 88 MMHG | BODY MASS INDEX: 29.88 KG/M2

## 2024-06-19 DIAGNOSIS — F32.4 MAJOR DEPRESSIVE DISORDER WITH SINGLE EPISODE, IN PARTIAL REMISSION: ICD-10-CM

## 2024-06-19 DIAGNOSIS — E11.42 TYPE 2 DIABETES MELLITUS WITH DIABETIC POLYNEUROPATHY, WITHOUT LONG-TERM CURRENT USE OF INSULIN: Primary | ICD-10-CM

## 2024-06-19 DIAGNOSIS — I10 PRIMARY HYPERTENSION: ICD-10-CM

## 2024-06-19 DIAGNOSIS — N18.32 STAGE 3B CHRONIC KIDNEY DISEASE: ICD-10-CM

## 2024-06-19 DIAGNOSIS — E78.2 MIXED HYPERLIPIDEMIA: ICD-10-CM

## 2024-06-19 DIAGNOSIS — E11.21 DIABETIC NEPHROPATHY ASSOCIATED WITH TYPE 2 DIABETES MELLITUS: ICD-10-CM

## 2024-06-19 PROCEDURE — 1159F MED LIST DOCD IN RCRD: CPT | Performed by: INTERNAL MEDICINE

## 2024-06-19 PROCEDURE — 3044F HG A1C LEVEL LT 7.0%: CPT | Performed by: INTERNAL MEDICINE

## 2024-06-19 PROCEDURE — 1126F AMNT PAIN NOTED NONE PRSNT: CPT | Performed by: INTERNAL MEDICINE

## 2024-06-19 PROCEDURE — 3075F SYST BP GE 130 - 139MM HG: CPT | Performed by: INTERNAL MEDICINE

## 2024-06-19 PROCEDURE — 99214 OFFICE O/P EST MOD 30 MIN: CPT | Performed by: INTERNAL MEDICINE

## 2024-06-19 PROCEDURE — G2211 COMPLEX E/M VISIT ADD ON: HCPCS | Performed by: INTERNAL MEDICINE

## 2024-06-19 PROCEDURE — 3079F DIAST BP 80-89 MM HG: CPT | Performed by: INTERNAL MEDICINE

## 2024-06-19 PROCEDURE — 1160F RVW MEDS BY RX/DR IN RCRD: CPT | Performed by: INTERNAL MEDICINE

## 2024-06-19 RX ORDER — ONDANSETRON 4 MG/1
4 TABLET, FILM COATED ORAL EVERY 8 HOURS PRN
Qty: 30 TABLET | Refills: 2 | Status: SHIPPED | OUTPATIENT
Start: 2024-06-19

## 2024-06-19 NOTE — PROGRESS NOTES
[facility]     Wallace Dukes  1955   [id number]      Patient Care Team:  Domingo Sood MD as PCP - General  Domingo Sood MD as PCP - Family Medicine    Chief Complaint::   Chief Complaint   Patient presents with    type 2 diabetes     6 months follow-up        HPI  History of Present Illness  The patient is a 68-year-old female who comes in for follow-up of diabetes, hypertension, hyperlipidemia, chronic kidney disease, and depression.    The patient reports a weight loss of 12 pounds since her last visit, which she attributes to the use of Ozempic. She maintains an active lifestyle, engaging in gardening, swimming, and caring for her grandchildren. Physically, she reports a general sense of well-being. However, she experiences intermittent episodes of nausea, occurring weekly or bi-weekly, which she manages with anti-nausea medication.      Chronic Conditions:      Patient Active Problem List   Diagnosis    GERD (gastroesophageal reflux disease)    Diabetic neuropathy associated with type 2 diabetes mellitus    Primary hypertension    Venous (peripheral) insufficiency    Mixed hyperlipidemia    Allergic rhinitis    Morbid obesity    Right elbow pain    Type 2 diabetes mellitus with diabetic polyneuropathy, without long-term current use of insulin    Major depressive disorder with single episode, in partial remission    Acute calculous cholecystitis    S/P laparoscopic cholecystectomy    Diabetic nephropathy associated with type 2 diabetes mellitus    Stage 3b chronic kidney disease    Personal history of breast cancer        Past Medical History:   Diagnosis Date    Cancer of breast 2014    Chest pain 2009    stress nuclear    Cholelithiasis 2019    Surgery    Diabetes mellitus     TYPE 2    Diarrhea     SINCE GALLBLADDER REMOVAL    GERD (gastroesophageal reflux disease)     taking Pantoprazole    Hernia of abdominal cavity     HL (hearing loss)     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  NO PROBLEMS AT THIS TIME, BUT AVOIDS IBUPROFEN.    MRSA (methicillin resistant Staphylococcus aureus)     infection of right breast expander; bilateral removal of expanders    Obesity     Renal insufficiency     Schwannoma 06/2016    left translabyrinthine craniotomy    Wears glasses     RX    Wears hearing aid     BILATERAL       Past Surgical History:   Procedure Laterality Date    BREAST RECONSTRUCTION, BREAST TISSUE EXPANDER INSERTION Bilateral 07/2013    BREAST SURGERY  2014    BREAST TISSUE EXPANDER REMOVAL INSERTION OF IMPLANT Bilateral     bilateral removal of expanders    CHOLECYSTECTOMY N/A 12/1/2019    Procedure: CHOLECYSTECTOMY LAPAROSCOPIC;  Surgeon: Rafiq Angelo MD;  Location:  [facility];  Service: General    COLONOSCOPY  2018    CRANIOTOMY FOR TUMOR  06/2016    Left Translabyrithine craniotomy    HYSTERECTOMY  1986    MASTECTOMY Bilateral 2014    complicated by MRSA wound infection on right requiring removal of expander.    TONSILLECTOMY      VENTRAL/INCISIONAL HERNIA REPAIR N/A 10/19/2020    Procedure: OPEN INCISIONAL HERNIA REPAIR WITH MESH;  Surgeon: Rafiq Angelo MD;  Location:  [facility];  Service: General;  Laterality: N/A;       Family History   Problem Relation Age of Onset    Hypertension Mother     Diabetes Mother     Migraines Mother     Arthritis Mother     Thyroid disease Mother     Diabetes Sister     Heart disease Sister     Hypertension Sister     COPD Sister     Kidney disease Sister     Hypertension Brother     Heart disease Brother     Diabetes Son     Heart disease Father     Vision loss Maternal Grandmother        Social History     Socioeconomic History    Marital status:    Tobacco Use    Smoking status: Never    Smokeless tobacco: Never   Vaping Use    Vaping  status: Never Used   Substance and Sexual Activity    Alcohol use: No    Drug use: No    Sexual activity: Not Currently     Birth control/protection: None       No Known Allergies      Current Outpatient Medications:     acetaminophen (TYLENOL) 325 MG tablet, Take 2 tablets by mouth Every 6 (Six) Hours As Needed for Mild Pain  or Moderate Pain  (Do not exceed 4,000mg in 24 hours)., Disp: , Rfl:     albuterol sulfate  (90 Base) MCG/ACT inhaler, Inhale 2 puffs Every 4 (Four) Hours As Needed for Wheezing., Disp: 18 g, Rfl: 2    Ascorbic Acid (VITAMIN C) 500 MG capsule, Take 1 capsule by mouth 2 (Two) Times a Day., Disp: , Rfl:     Calcium Carb-Cholecalciferol (CALCIUM 600-D PO), Take  by mouth 2 (Two) Times a Day., Disp: , Rfl:     cholecalciferol (VITAMIN D3) 1000 units tablet, Take 2 tablets by mouth Every Morning., Disp: , Rfl:     escitalopram (LEXAPRO) 10 MG tablet, TAKE 1 TABLET BY MOUTH EVERY DAY, Disp: 90 tablet, Rfl: 1    loratadine (CLARITIN) 10 MG tablet, Take 1 tablet by mouth Daily., Disp: , Rfl:     losartan (COZAAR) 100 MG tablet, TAKE 1 TABLET BY MOUTH EVERY DAY, Disp: 90 tablet, Rfl: 1    metFORMIN (GLUCOPHAGE) 1000 MG tablet, Take 0.5 tablets by mouth 2 (Two) Times a Day With Meals., Disp: , Rfl:     Multiple Vitamins-Minerals (MULTIVITAMIN ADULT PO), Take 1 tablet by mouth Daily., Disp: , Rfl:     pantoprazole (PROTONIX) 40 MG EC tablet, TAKE 1 TABLET BY MOUTH TWICE A DAY, Disp: 180 tablet, Rfl: 1    rosuvastatin (CRESTOR) 20 MG tablet, TAKE 1 TABLET BY MOUTH EVERY DAY, Disp: 90 tablet, Rfl: 3    Semaglutide, 2 MG/DOSE, (Ozempic, 2 MG/DOSE,) 8 MG/3ML solution pen-injector, Inject 2 mg under the skin into the appropriate area as directed 1 (One) Time Per Week., Disp: 3 mL, Rfl: 5    ondansetron (Zofran) 4 MG tablet, Take 1 tablet by mouth Every 8 (Eight) Hours As Needed for Nausea., Disp: 30 tablet, Rfl: 2    Review of Systems   Constitutional: Negative.    Respiratory: Negative.  Negative for  "chest tightness and shortness of breath.    Cardiovascular: Negative.  Negative for chest pain.   Gastrointestinal:  Negative for abdominal pain, blood in stool, constipation and diarrhea.        Vital Signs  Vitals:    06/19/24 0855   BP: 130/88   BP Location: Left arm   Patient Position: Sitting   Cuff Size: Adult   Pulse: 82   Temp: 98.2 °F (36.8 °C)   TempSrc: Infrared   SpO2: 96%   Weight: 79.4 kg (175 lb)   Height: 163 cm (64.17\")   PainSc: 0-No pain       Physical Exam  Vitals reviewed.   Constitutional:       Appearance: Normal appearance. She is well-developed.   HENT:      Head: Normocephalic and atraumatic.   Neck:      Thyroid: No thyromegaly.      Vascular: No carotid bruit.   Cardiovascular:      Rate and Rhythm: Normal rate and regular rhythm.      Heart sounds: Normal heart sounds. No murmur heard.     No friction rub. No gallop.   Pulmonary:      Effort: Pulmonary effort is normal.      Breath sounds: Normal breath sounds.   Musculoskeletal:      Cervical back: Normal range of motion and neck supple.      Right lower leg: No edema.      Left lower leg: No edema.   Lymphadenopathy:      Cervical: No cervical adenopathy.   Skin:     General: Skin is warm and dry.   Neurological:      Mental Status: She is alert and oriented to person, place, and time.      Cranial Nerves: No cranial nerve deficit.   Psychiatric:         Mood and Affect: Mood normal.         Behavior: Behavior normal.        Physical Exam      Procedures    ACE III MINI        Results  Laboratory Studies  Apolife protein B is well controlled at 84. A1c was 5.7.           Assessment/Plan:    Diagnoses and all orders for this visit:    1. Type 2 diabetes mellitus with diabetic polyneuropathy, without long-term current use of insulin (Primary)    2. Primary hypertension    3. Mixed hyperlipidemia    4. Stage 3b chronic kidney disease  -     Ambulatory Referral to Nephrology    5. Diabetic nephropathy associated with type 2 diabetes " mellitus  -     Ambulatory Referral to Nephrology    6. Major depressive disorder with single episode, in partial remission    Other orders  -     ondansetron (Zofran) 4 MG tablet; Take 1 tablet by mouth Every 8 (Eight) Hours As Needed for Nausea.  Dispense: 30 tablet; Refill: 2  -     metFORMIN (GLUCOPHAGE) 1000 MG tablet; Take 0.5 tablets by mouth 2 (Two) Times a Day With Meals.      Assessment & Plan  1. Diabetes.  The patient's A1c is well-managed at 5.7 percent. She has demonstrated an excellent response to semaglutide, resulting in a weight loss of another 12 pounds since her last visit. Her physical activity is commendable, as evidenced by gardening and caring for her grandchildren. The dosage of metformin will be reduced to 500 mg twice daily, while the current dose of semaglutide will be maintained. Encouragement was given to maintain muscle strength and enhance protein intake.    2. Hypertension.  Her blood pressure is well-managed with losartan.    3. Hyperlipidemia.  Her Apolife protein B is well-managed at 84. Continuation of rosuvastatin and a healthy diet is advised.    4. Chronic kidney disease.  Her GFR has shown significant improvement in the mid 40s, although it is unclear if this is due to improved hydration or semaglutide use. Continued follow-up with nephrology is advised.    5. Depression.  Her mood remains well-compensated on escitalopram.    Follow-up  A follow-up appointment is scheduled for 6 months from now.      Plan of care reviewed with patient at the conclusion of today's visit. Education was provided regarding diagnosis, management, and any prescribed or recommended OTC medications.Patient verbalizes understanding of and agreement with management plan.     Patient or patient representative verbalized consent for the use of Ambient Listening during the visit with  Domingo Sood MD for chart documentation. 6/19/2024  13:53 EDT        Domingo Sood MD

## 2024-07-18 ENCOUNTER — TELEPHONE (OUTPATIENT)
Dept: INTERNAL MEDICINE | Facility: CLINIC | Age: 69
End: 2024-07-18
Payer: MEDICARE

## 2024-07-18 NOTE — TELEPHONE ENCOUNTER
"----- Message from Glo JUAN sent at 7/18/2024  8:21 AM EDT -----  Regarding: FW: Ozempic  Contact: [phone]    ----- Message -----  From: Wallace Dukes \"Barbara\"  Sent: 7/17/2024   5:14 PM EDT  To: [facility] [address] [facility] [address]  Subject: Petra Mcqueen,  Thank you for filling out the physician section for the [employer] Ozempic assistance program. I was just notified I am approved and they will cover the cost 100%. They will be sending a 120 supply to your office between July 30 -August 5.  Once your office receives it if someone will let me know I’ll stop by and pick it up.   Thank you for your assistance and ALL your support    Barbara"

## 2024-08-06 ENCOUNTER — TELEPHONE (OUTPATIENT)
Dept: INTERNAL MEDICINE | Facility: CLINIC | Age: 69
End: 2024-08-06
Payer: MEDICARE

## 2024-08-06 NOTE — TELEPHONE ENCOUNTER
Patient notified her ozempic through patient assistance program was delivered to our office today. She will come to .

## 2024-09-17 RX ORDER — PANTOPRAZOLE SODIUM 40 MG/1
TABLET, DELAYED RELEASE ORAL
Qty: 180 TABLET | Refills: 1 | Status: SHIPPED | OUTPATIENT
Start: 2024-09-17

## 2024-09-23 RX ORDER — LOSARTAN POTASSIUM 100 MG/1
TABLET ORAL
Qty: 90 TABLET | Refills: 1 | Status: SHIPPED | OUTPATIENT
Start: 2024-09-23

## 2024-09-26 ENCOUNTER — EXTERNAL PBMM DATA (OUTPATIENT)
Dept: PHARMACY | Facility: OTHER | Age: 69
End: 2024-09-26
Payer: MEDICARE

## 2024-10-24 ENCOUNTER — TRANSCRIBE ORDERS (OUTPATIENT)
Dept: LAB | Facility: HOSPITAL | Age: 69
End: 2024-10-24
Payer: MEDICARE

## 2024-10-24 ENCOUNTER — LAB (OUTPATIENT)
Dept: LAB | Facility: HOSPITAL | Age: 69
End: 2024-10-24
Payer: MEDICARE

## 2024-10-24 DIAGNOSIS — N18.32 STAGE 3B CHRONIC KIDNEY DISEASE: Primary | ICD-10-CM

## 2024-10-24 DIAGNOSIS — N18.32 STAGE 3B CHRONIC KIDNEY DISEASE: ICD-10-CM

## 2024-10-24 LAB
25(OH)D3 SERPL-MCNC: 61 NG/ML (ref 30–100)
ALBUMIN SERPL-MCNC: 4 G/DL (ref 3.5–5.2)
ALBUMIN UR-MCNC: <1.2 MG/DL
ANION GAP SERPL CALCULATED.3IONS-SCNC: 9.6 MMOL/L (ref 5–15)
BILIRUB UR QL STRIP: NEGATIVE
BUN SERPL-MCNC: 25 MG/DL (ref 8–23)
BUN/CREAT SERPL: 18 (ref 7–25)
CALCIUM SPEC-SCNC: 9.6 MG/DL (ref 8.6–10.5)
CHLORIDE SERPL-SCNC: 104 MMOL/L (ref 98–107)
CLARITY UR: CLEAR
CO2 SERPL-SCNC: 25.4 MMOL/L (ref 22–29)
COLOR UR: YELLOW
CREAT SERPL-MCNC: 1.39 MG/DL (ref 0.57–1)
CREAT UR-MCNC: 132.8 MG/DL
CREAT UR-MCNC: 132.8 MG/DL
DEPRECATED RDW RBC AUTO: 43.2 FL (ref 37–54)
EGFRCR SERPLBLD CKD-EPI 2021: 40.9 ML/MIN/1.73
ERYTHROCYTE [DISTWIDTH] IN BLOOD BY AUTOMATED COUNT: 12.7 % (ref 12.3–15.4)
GLUCOSE SERPL-MCNC: 90 MG/DL (ref 65–99)
GLUCOSE UR STRIP-MCNC: NEGATIVE MG/DL
HCT VFR BLD AUTO: 36.6 % (ref 34–46.6)
HGB BLD-MCNC: 12.1 G/DL (ref 12–15.9)
HGB UR QL STRIP.AUTO: NEGATIVE
KETONES UR QL STRIP: NEGATIVE
LEUKOCYTE ESTERASE UR QL STRIP.AUTO: NEGATIVE
MCH RBC QN AUTO: 30.9 PG (ref 26.6–33)
MCHC RBC AUTO-ENTMCNC: 33.1 G/DL (ref 31.5–35.7)
MCV RBC AUTO: 93.4 FL (ref 79–97)
MICROALBUMIN/CREAT UR: NORMAL MG/G{CREAT}
NITRITE UR QL STRIP: NEGATIVE
PH UR STRIP.AUTO: 5.5 [PH] (ref 5–8)
PHOSPHATE SERPL-MCNC: 3.9 MG/DL (ref 2.5–4.5)
PLATELET # BLD AUTO: 397 10*3/MM3 (ref 140–450)
PMV BLD AUTO: 9.2 FL (ref 6–12)
POTASSIUM SERPL-SCNC: 5 MMOL/L (ref 3.5–5.2)
PROT ?TM UR-MCNC: 17.1 MG/DL
PROT UR QL STRIP: NEGATIVE
PROT/CREAT UR: 128.8 MG/G CREA (ref 0–200)
RBC # BLD AUTO: 3.92 10*6/MM3 (ref 3.77–5.28)
SODIUM SERPL-SCNC: 139 MMOL/L (ref 136–145)
SP GR UR STRIP: 1.02 (ref 1–1.03)
UROBILINOGEN UR QL STRIP: NORMAL
WBC NRBC COR # BLD AUTO: 12.28 10*3/MM3 (ref 3.4–10.8)

## 2024-10-24 PROCEDURE — 81003 URINALYSIS AUTO W/O SCOPE: CPT

## 2024-10-24 PROCEDURE — 82570 ASSAY OF URINE CREATININE: CPT

## 2024-10-24 PROCEDURE — 85027 COMPLETE CBC AUTOMATED: CPT | Performed by: PHYSICIAN ASSISTANT

## 2024-10-24 PROCEDURE — 36415 COLL VENOUS BLD VENIPUNCTURE: CPT

## 2024-10-24 PROCEDURE — 82306 VITAMIN D 25 HYDROXY: CPT

## 2024-10-24 PROCEDURE — 80069 RENAL FUNCTION PANEL: CPT

## 2024-10-24 PROCEDURE — 82043 UR ALBUMIN QUANTITATIVE: CPT

## 2024-10-24 PROCEDURE — 84156 ASSAY OF PROTEIN URINE: CPT

## 2024-10-31 RX ORDER — ESCITALOPRAM OXALATE 10 MG/1
TABLET ORAL
Qty: 90 TABLET | Refills: 1 | Status: SHIPPED | OUTPATIENT
Start: 2024-10-31

## 2024-11-25 RX ORDER — ROSUVASTATIN CALCIUM 20 MG/1
20 TABLET, COATED ORAL DAILY
Qty: 90 TABLET | Refills: 3 | Status: SHIPPED | OUTPATIENT
Start: 2024-11-25

## 2024-11-25 NOTE — TELEPHONE ENCOUNTER
Rx Refill Note  Requested Prescriptions     Pending Prescriptions Disp Refills    rosuvastatin (CRESTOR) 20 MG tablet [Pharmacy Med Name: ROSUVASTATIN CALCIUM 20 MG TAB] 90 tablet 3     Sig: TAKE 1 TABLET BY MOUTH EVERY DAY      Last office visit with prescribing clinician: 6/19/2024   Last telemedicine visit with prescribing clinician: Visit date not found   Next office visit with prescribing clinician: 12/4/2024                         Would you like a call back once the refill request has been completed: [] Yes [] No    If the office needs to give you a call back, can they leave a voicemail: [] Yes [] No    Glenis Figueroa MA  11/25/24, 10:38 EST

## 2024-11-26 ENCOUNTER — TELEPHONE (OUTPATIENT)
Dept: INTERNAL MEDICINE | Facility: CLINIC | Age: 69
End: 2024-11-26
Payer: MEDICARE

## 2024-11-26 DIAGNOSIS — E78.2 MIXED HYPERLIPIDEMIA: ICD-10-CM

## 2024-11-26 DIAGNOSIS — E11.42 DIABETIC POLYNEUROPATHY ASSOCIATED WITH TYPE 2 DIABETES MELLITUS: Primary | ICD-10-CM

## 2024-11-27 ENCOUNTER — LAB (OUTPATIENT)
Dept: LAB | Facility: HOSPITAL | Age: 69
End: 2024-11-27
Payer: MEDICARE

## 2024-11-27 DIAGNOSIS — E78.2 MIXED HYPERLIPIDEMIA: ICD-10-CM

## 2024-11-27 DIAGNOSIS — E11.42 DIABETIC POLYNEUROPATHY ASSOCIATED WITH TYPE 2 DIABETES MELLITUS: ICD-10-CM

## 2024-11-27 LAB
ALBUMIN SERPL-MCNC: 3.9 G/DL (ref 3.5–5.2)
ALBUMIN/GLOB SERPL: 1.2 G/DL
ALP SERPL-CCNC: 103 U/L (ref 39–117)
ALT SERPL W P-5'-P-CCNC: 18 U/L (ref 1–33)
ANION GAP SERPL CALCULATED.3IONS-SCNC: 9.6 MMOL/L (ref 5–15)
AST SERPL-CCNC: 23 U/L (ref 1–32)
BILIRUB SERPL-MCNC: 0.4 MG/DL (ref 0–1.2)
BUN SERPL-MCNC: 21 MG/DL (ref 8–23)
BUN/CREAT SERPL: 14.9 (ref 7–25)
CALCIUM SPEC-SCNC: 9.7 MG/DL (ref 8.6–10.5)
CHLORIDE SERPL-SCNC: 100 MMOL/L (ref 98–107)
CHOLEST SERPL-MCNC: 179 MG/DL (ref 0–200)
CO2 SERPL-SCNC: 25.4 MMOL/L (ref 22–29)
CREAT SERPL-MCNC: 1.41 MG/DL (ref 0.57–1)
EGFRCR SERPLBLD CKD-EPI 2021: 40.2 ML/MIN/1.73
GLOBULIN UR ELPH-MCNC: 3.3 GM/DL
GLUCOSE SERPL-MCNC: 79 MG/DL (ref 65–99)
HBA1C MFR BLD: 5.6 % (ref 4.8–5.6)
HDLC SERPL-MCNC: 55 MG/DL (ref 40–60)
LDLC SERPL CALC-MCNC: 98 MG/DL (ref 0–100)
LDLC/HDLC SERPL: 1.71 {RATIO}
POTASSIUM SERPL-SCNC: 4.7 MMOL/L (ref 3.5–5.2)
PROT SERPL-MCNC: 7.2 G/DL (ref 6–8.5)
SODIUM SERPL-SCNC: 135 MMOL/L (ref 136–145)
TRIGL SERPL-MCNC: 150 MG/DL (ref 0–150)
VLDLC SERPL-MCNC: 26 MG/DL (ref 5–40)

## 2024-11-27 PROCEDURE — 83036 HEMOGLOBIN GLYCOSYLATED A1C: CPT

## 2024-11-27 PROCEDURE — 80061 LIPID PANEL: CPT

## 2024-11-27 PROCEDURE — 80053 COMPREHEN METABOLIC PANEL: CPT

## 2024-12-04 ENCOUNTER — OFFICE VISIT (OUTPATIENT)
Dept: INTERNAL MEDICINE | Facility: CLINIC | Age: 69
End: 2024-12-04
Payer: MEDICARE

## 2024-12-04 VITALS
DIASTOLIC BLOOD PRESSURE: 82 MMHG | WEIGHT: 173.8 LBS | OXYGEN SATURATION: 95 % | HEIGHT: 64 IN | SYSTOLIC BLOOD PRESSURE: 124 MMHG | BODY MASS INDEX: 29.67 KG/M2 | HEART RATE: 62 BPM | TEMPERATURE: 98.2 F

## 2024-12-04 DIAGNOSIS — I10 PRIMARY HYPERTENSION: ICD-10-CM

## 2024-12-04 DIAGNOSIS — F32.4 MAJOR DEPRESSIVE DISORDER WITH SINGLE EPISODE, IN PARTIAL REMISSION: ICD-10-CM

## 2024-12-04 DIAGNOSIS — K21.9 GASTROESOPHAGEAL REFLUX DISEASE WITHOUT ESOPHAGITIS: ICD-10-CM

## 2024-12-04 DIAGNOSIS — E78.2 MIXED HYPERLIPIDEMIA: ICD-10-CM

## 2024-12-04 DIAGNOSIS — Z00.00 PREVENTATIVE HEALTH CARE: Primary | ICD-10-CM

## 2024-12-04 DIAGNOSIS — N18.32 STAGE 3B CHRONIC KIDNEY DISEASE: ICD-10-CM

## 2024-12-04 DIAGNOSIS — E11.42 TYPE 2 DIABETES MELLITUS WITH DIABETIC POLYNEUROPATHY, WITHOUT LONG-TERM CURRENT USE OF INSULIN: ICD-10-CM

## 2024-12-04 PROBLEM — Z90.49 S/P LAPAROSCOPIC CHOLECYSTECTOMY: Status: RESOLVED | Noted: 2019-12-15 | Resolved: 2024-12-04

## 2024-12-04 PROBLEM — K80.00 ACUTE CALCULOUS CHOLECYSTITIS: Status: RESOLVED | Noted: 2019-11-30 | Resolved: 2024-12-04

## 2024-12-04 NOTE — PROGRESS NOTES
Subjective   The ABCs of the Annual Wellness Visit  Medicare Wellness Visit      Wallace Dukes is a 69 y.o. patient who presents for a Medicare Wellness Visit.    The following portions of the patient's history were reviewed and   updated as appropriate: allergies, current medications, past family history, past medical history, past social history, past surgical history, and problem list.    Compared to one year ago, the patient's physical   health is the same.  Compared to one year ago, the patient's mental   health is the same.    Recent Hospitalizations:  She was not admitted to the hospital during the last year.     Current Medical Providers:  Patient Care Team:  Domingo Sood MD as PCP - General  Domingo Sood MD as PCP - Family Medicine    Outpatient Medications Prior to Visit   Medication Sig Dispense Refill    Ascorbic Acid (VITAMIN C) 500 MG capsule Take 1 capsule by mouth 2 (Two) Times a Day.      Calcium Carb-Cholecalciferol (CALCIUM 600-D PO) Take  by mouth 2 (Two) Times a Day.      cholecalciferol (VITAMIN D3) 1000 units tablet Take 2 tablets by mouth Every Morning.      escitalopram (LEXAPRO) 10 MG tablet TAKE 1 TABLET BY MOUTH EVERY DAY 90 tablet 1    loratadine (CLARITIN) 10 MG tablet Take 1 tablet by mouth Daily.      losartan (COZAAR) 100 MG tablet TAKE 1 TABLET BY MOUTH EVERY DAY 90 tablet 1    metFORMIN (GLUCOPHAGE) 1000 MG tablet Take 0.5 tablets by mouth 2 (Two) Times a Day With Meals.      Multiple Vitamins-Minerals (MULTIVITAMIN ADULT PO) Take 1 tablet by mouth Daily.      ondansetron (Zofran) 4 MG tablet Take 1 tablet by mouth Every 8 (Eight) Hours As Needed for Nausea. 30 tablet 2    pantoprazole (PROTONIX) 40 MG EC tablet TAKE 1 TABLET BY MOUTH TWICE A  tablet 1    rosuvastatin (CRESTOR) 20 MG tablet TAKE 1 TABLET BY MOUTH EVERY DAY 90 tablet 3    Semaglutide, 2 MG/DOSE, (Ozempic, 2 MG/DOSE,) 8 MG/3ML solution pen-injector Inject 2 mg under the skin into the  "appropriate area as directed 1 (One) Time Per Week. 3 mL 5    acetaminophen (TYLENOL) 325 MG tablet Take 2 tablets by mouth Every 6 (Six) Hours As Needed for Mild Pain  or Moderate Pain  (Do not exceed 4,000mg in 24 hours).      albuterol sulfate  (90 Base) MCG/ACT inhaler Inhale 2 puffs Every 4 (Four) Hours As Needed for Wheezing. 18 g 2     No facility-administered medications prior to visit.     No opioid medication identified on active medication list. I have reviewed chart for other potential  high risk medication/s and harmful drug interactions in the elderly.      Aspirin is not on active medication list.  Aspirin use is not indicated based on review of current medical condition/s. Risk of harm outweighs potential benefits.  .    Patient Active Problem List   Diagnosis    GERD (gastroesophageal reflux disease)    Diabetic neuropathy associated with type 2 diabetes mellitus    Primary hypertension    Venous (peripheral) insufficiency    Mixed hyperlipidemia    Allergic rhinitis    Morbid obesity    Right elbow pain    Type 2 diabetes mellitus with diabetic polyneuropathy, without long-term current use of insulin    Major depressive disorder with single episode, in partial remission    Diabetic nephropathy associated with type 2 diabetes mellitus    Stage 3b chronic kidney disease    Personal history of breast cancer     Advance Care Planning Advance Directive is not on file.  ACP discussion was held with the patient during this visit. Patient has an advance directive (not in EMR), copy requested.            Objective   Vitals:    12/04/24 0910   BP: 124/82   BP Location: Left arm   Patient Position: Sitting   Cuff Size: Adult   Pulse: 62   Temp: 98.2 °F (36.8 °C)   TempSrc: Infrared   SpO2: 95%   Weight: 78.8 kg (173 lb 12.8 oz)   Height: 163 cm (64.17\")   PainSc:   3   PainLoc: Shoulder  Comment: Both sides but left side bothering her more.       Estimated body mass index is 29.67 kg/m² as calculated " "from the following:    Height as of this encounter: 163 cm (64.17\").    Weight as of this encounter: 78.8 kg (173 lb 12.8 oz).            Does the patient have evidence of cognitive impairment? No  Lab Results   Component Value Date    TRIG 150 2024    HDL 55 2024    LDL 98 2024    VLDL 26 2024    HGBA1C 5.60 2024                                                                                                Health  Risk Assessment    Smoking Status:  Social History     Tobacco Use   Smoking Status Never   Smokeless Tobacco Never     Alcohol Consumption:  Social History     Substance and Sexual Activity   Alcohol Use No       Fall Risk Screen  STEADI Fall Risk Assessment was completed, and patient is at LOW risk for falls.Assessment completed on:2024    Depression Screening   Little interest or pleasure in doing things? Not at all   Feeling down, depressed, or hopeless? Not at all   PHQ-2 Total Score 0      Health Habits and Functional and Cognitive Screenin/3/2024    10:18 AM   Functional & Cognitive Status   Do you have difficulty preparing food and eating? No    Do you have difficulty bathing yourself, getting dressed or grooming yourself? No    Do you have difficulty using the toilet? No    Do you have difficulty moving around from place to place? No    Do you have trouble with steps or getting out of a bed or a chair? No    Current Diet Well Balanced Diet    Dental Exam Up to date    Eye Exam Up to date    Exercise (times per week) 1 times per week    Current Exercises Include Other;Yard Work    Do you need help using the phone?  No    Are you deaf or do you have serious difficulty hearing?  Yes    Do you need help to go to places out of walking distance? No    Do you need help shopping? No    Do you need help preparing meals?  No    Do you need help with housework?  No    Do you need help with laundry? No    Do you need help taking your medications? No    Do you need " help managing money? No    Do you ever drive or ride in a car without wearing a seat belt? No    Have you felt unusual stress, anger or loneliness in the last month? No    Who do you live with? Other    If you need help, do you have trouble finding someone available to you? No    Have you been bothered in the last four weeks by sexual problems? No    Do you have difficulty concentrating, remembering or making decisions? No        Patient-reported           Age-appropriate Screening Schedule:  Refer to the list below for future screening recommendations based on patient's age, sex and/or medical conditions. Orders for these recommended tests are listed in the plan section. The patient has been provided with a written plan.    Health Maintenance List  Health Maintenance   Topic Date Due    COVID-19 Vaccine (7 - 2024-25 season) 09/01/2024    ANNUAL WELLNESS VISIT  12/18/2024    BMI FOLLOWUP  04/09/2025    HEMOGLOBIN A1C  05/27/2025    DIABETIC EYE EXAM  05/28/2025    TDAP/TD VACCINES (2 - Td or Tdap) 11/05/2025    LIPID PANEL  11/27/2025    DIABETIC FOOT EXAM  12/04/2025    DXA SCAN  01/17/2026    COLORECTAL CANCER SCREENING  09/26/2028    HEPATITIS C SCREENING  Completed    INFLUENZA VACCINE  Completed    Pneumococcal Vaccine 65+  Completed    ZOSTER VACCINE  Completed    MAMMOGRAM  Discontinued    URINE MICROALBUMIN  Discontinued                                                                                                                                                CMS Preventative Services Quick Reference  Risk Factors Identified During Encounter  None Identified    The above risks/problems have been discussed with the patient.  Pertinent information has been shared with the patient in the After Visit Summary.  An After Visit Summary and PPPS were made available to the patient.    Follow Up:   Next Medicare Wellness visit to be scheduled in 1 year.         Additional E&M Note during same encounter  "follows:  Patient has additional, significant, and separately identifiable condition(s)/problem(s) that require work above and beyond the Medicare Wellness Visit     Chief Complaint  Annual Exam, Hypertension, and Hyperlipidemia    Subjective    HPI  Barbara is also being seen today for an annual adult preventative physical exam.        The patient is a 69-year-old female who comes in for a subsequent Medicare annual wellness examination and for follow-up of diabetes, hypertension, hyperlipidemia, GERD, chronic kidney disease, and depression.    She reports experiencing pain in her left shoulder, which occasionally disrupts her sleep. She also mentions difficulty in raising her arm. To manage the pain, she has been taking Tylenol Extra Strength, two tablets daily.    She has noticed a decrease in her sodium levels and is curious about the implications of this change. She has been making an effort to monitor her salt intake.    She has experienced weight loss, which she attributes to the use of Mounjaro and Ozempic. She believes that Ozempic has been effective in managing her A1c levels.       No opioid medication identified on active medication list. I have reviewed chart for other potential  high risk medication/s and harmful drug interactions in the elderly.       Objective   Vital Signs:  /82 (BP Location: Left arm, Patient Position: Sitting, Cuff Size: Adult)   Pulse 62   Temp 98.2 °F (36.8 °C) (Infrared)   Ht 163 cm (64.17\")   Wt 78.8 kg (173 lb 12.8 oz)   SpO2 95%   BMI 29.67 kg/m²   Physical Exam  Vitals reviewed.   Constitutional:       Appearance: She is well-developed.   HENT:      Head: Normocephalic and atraumatic.      Right Ear: Tympanic membrane and ear canal normal.      Left Ear: Tympanic membrane and ear canal normal.      Mouth/Throat:      Pharynx: Oropharynx is clear. No posterior oropharyngeal erythema.   Eyes:      Extraocular Movements: Extraocular movements intact.      Pupils: " Pupils are equal, round, and reactive to light.   Neck:      Thyroid: No thyromegaly.      Vascular: No carotid bruit.   Cardiovascular:      Rate and Rhythm: Normal rate and regular rhythm.      Pulses:           Dorsalis pedis pulses are 1+ on the right side and 1+ on the left side.      Heart sounds: Normal heart sounds. No murmur heard.     No friction rub. No gallop.   Pulmonary:      Effort: Pulmonary effort is normal.      Breath sounds: Normal breath sounds.   Abdominal:      Tenderness: There is no abdominal tenderness.   Musculoskeletal:      Cervical back: Normal range of motion and neck supple.      Right lower leg: No edema.      Left lower leg: No edema.      Right foot: No deformity.      Left foot: No deformity.   Feet:      Right foot:      Protective Sensation: 5 sites tested.  5 sites sensed.      Skin integrity: Skin integrity normal.      Left foot:      Protective Sensation: 5 sites tested.  5 sites sensed.      Skin integrity: Skin integrity normal.      Comments: Sensation in both feet intact to monofilament.     Diabetic Foot Exam Performed and Monofilament Test Performed    Lymphadenopathy:      Cervical: No cervical adenopathy.   Skin:     General: Skin is warm and dry.   Neurological:      Mental Status: She is alert.      Cranial Nerves: No cranial nerve deficit.      Motor: No weakness.      Gait: Gait normal.   Psychiatric:         Mood and Affect: Mood normal.         Behavior: Behavior normal.           Vital Signs  BMI is 29.6. Weight is 173.            Results  Laboratory Studies  Sodium level was 135. A1c is 5.6. GFR is stable at 40.2.              Assessment and Plan Additional age appropriate preventative wellness advice topics were discussed during today's preventative wellness exam(some topics already addressed during AWV portion of the note above):    Physical Activity: Advised cardiovascular activity 150 minutes per week as tolerated. (example brisk walk for 30 minutes, 5  days a week).     Nutrition: Discussed nutrition plan with patient. Information shared in after visit summary. Goal is for a well balanced diet to enhance overall health.     Healthy Weight: Discussed current and goal BMI with patient. Steps to attain this goal discussed. Information shared in after visit summary.     Injury Prevention Discussion:  Information shared in after visit summary.             1. Health Maintenance.  Overall, she is doing very well. She is physically active, taking care of her grandchildren. She is fully vaccinated against influenza and COVID-19 and up to date on colorectal cancer screening.    2. Diabetes Mellitus.  A1c is very well controlled on semaglutide and metformin. Currently, she is on 500 mg of metformin twice a day. When she has completed her current supply, consider changing metformin to the extended release 500 mg once a day. Foot exam was performed today.    3. Hypertension.  Blood pressure is well controlled with weight loss alone.    4. Hyperlipidemia.  Lipids are very well controlled on rosuvastatin.    5. Gastroesophageal Reflux Disease (GERD).  GERD is controlled on pantoprazole.    6. Chronic Kidney Disease.  GFR is stable at 40.2. The treatment remains control of blood glucose, blood pressure, and avoidance of NSAIDs. She is advised to stay hydrated to prevent worsening of kidney function.    7. Depression.  Mood is well compensated on escitalopram.    8. Left Shoulder Pain.  She is currently managing this with acetaminophen. If needed, physical therapy would be the next step.              Follow Up   Return in about 6 months (around 6/4/2025) for follow up.  Patient was given instructions and counseling regarding her condition or for health maintenance advice. Please see specific information pulled into the AVS if appropriate.  Patient or patient representative verbalized consent for the use of Ambient Listening during the visit with  Domingo Sood MD for  chart documentation. 12/8/2024  10:58 EST

## 2025-02-14 ENCOUNTER — OFFICE VISIT (OUTPATIENT)
Dept: INTERNAL MEDICINE | Facility: CLINIC | Age: 70
End: 2025-02-14
Payer: MEDICARE

## 2025-02-14 ENCOUNTER — PATIENT MESSAGE (OUTPATIENT)
Dept: INTERNAL MEDICINE | Facility: CLINIC | Age: 70
End: 2025-02-14
Payer: MEDICARE

## 2025-02-14 VITALS
WEIGHT: 180.2 LBS | OXYGEN SATURATION: 98 % | HEART RATE: 98 BPM | DIASTOLIC BLOOD PRESSURE: 82 MMHG | BODY MASS INDEX: 30.77 KG/M2 | TEMPERATURE: 98.2 F | HEIGHT: 64 IN | SYSTOLIC BLOOD PRESSURE: 112 MMHG

## 2025-02-14 DIAGNOSIS — J01.00 ACUTE NON-RECURRENT MAXILLARY SINUSITIS: Primary | ICD-10-CM

## 2025-02-14 DIAGNOSIS — R05.2 SUBACUTE COUGH: ICD-10-CM

## 2025-02-14 LAB
EXPIRATION DATE: NORMAL
FLUAV AG UPPER RESP QL IA.RAPID: NOT DETECTED
FLUBV AG UPPER RESP QL IA.RAPID: NOT DETECTED
INTERNAL CONTROL: NORMAL
Lab: NORMAL
SARS-COV-2 AG UPPER RESP QL IA.RAPID: NOT DETECTED

## 2025-02-14 PROCEDURE — 1126F AMNT PAIN NOTED NONE PRSNT: CPT | Performed by: INTERNAL MEDICINE

## 2025-02-14 PROCEDURE — 1160F RVW MEDS BY RX/DR IN RCRD: CPT | Performed by: INTERNAL MEDICINE

## 2025-02-14 PROCEDURE — 1159F MED LIST DOCD IN RCRD: CPT | Performed by: INTERNAL MEDICINE

## 2025-02-14 PROCEDURE — 99213 OFFICE O/P EST LOW 20 MIN: CPT | Performed by: INTERNAL MEDICINE

## 2025-02-14 PROCEDURE — 3074F SYST BP LT 130 MM HG: CPT | Performed by: INTERNAL MEDICINE

## 2025-02-14 PROCEDURE — 3079F DIAST BP 80-89 MM HG: CPT | Performed by: INTERNAL MEDICINE

## 2025-02-14 PROCEDURE — 87428 SARSCOV & INF VIR A&B AG IA: CPT | Performed by: INTERNAL MEDICINE

## 2025-02-14 RX ORDER — HYDROCODONE POLISTIREX AND CHLORPHENIRAMINE POLISTIREX 10; 8 MG/5ML; MG/5ML
5 SUSPENSION, EXTENDED RELEASE ORAL EVERY 12 HOURS PRN
Qty: 180 ML | Refills: 0 | Status: SHIPPED | OUTPATIENT
Start: 2025-02-14 | End: 2025-02-17 | Stop reason: SDUPTHER

## 2025-02-14 NOTE — TELEPHONE ENCOUNTER
"    Caller: Wallace Dukes \"Barbara\"    Relationship to patient: Self    Best call back number: [phone]     Patient is needing: Hydrocod Rajesh-Chlorphe Rajesh ER (TUSSIONEX PENNKINETIC) 10-8 MG/5ML ER suspension   [facility] IS OUT OF THIS MEDICATION    PLEASE SEND THIS MEDICATION TO TO:  [facility]/pharmacy #[id number] - [place], [address] [phone] [facility] [phone] FX   "

## 2025-02-14 NOTE — PROGRESS NOTES
[facility]     Wallace Dukes  1955   [id number]      Patient Care Team:  Domingo Sood MD as PCP - General  Domingo Sood MD as PCP - Family Medicine    Chief Complaint::   Chief Complaint   Patient presents with    Cough    URI    Fatigue    Diabetes    Heartburn    Hyperlipidemia        HPI  History of Present Illness  The patient is a 69-year-old female who presents with complaints of cough and nasal congestion.    She reports the onset of her symptoms last week, characterized by a low-grade fever peaking at 100 degrees, accompanied by a cough and nasal congestion. She experiences occasional expectoration and describes the nasal discharge as yellowish in color. She also reports a sensation of a lump in her chest but does not experience any ocular pressure.    Additionally, she mentions an episode of hypotension last week, with blood pressure readings fluctuating from 89/72 to 91/71, and eventually stabilizing at 107/78. She is uncertain about the cause of this hypotensive episode. It is noteworthy that she had already administered her morning dose of losartan on the day of the incident.    MEDICATIONS  Current: losartan      Chronic Conditions:      Patient Active Problem List   Diagnosis    GERD (gastroesophageal reflux disease)    Diabetic neuropathy associated with type 2 diabetes mellitus    Primary hypertension    Venous (peripheral) insufficiency    Mixed hyperlipidemia    Allergic rhinitis    Morbid obesity    Right elbow pain    Type 2 diabetes mellitus with diabetic polyneuropathy, without long-term current use of insulin    Major depressive disorder with single episode, in partial remission    Diabetic nephropathy associated with type 2 diabetes mellitus    Stage 3b chronic kidney disease    Personal history of breast cancer        Past Medical History:   Diagnosis Date    Acute calculous cholecystitis 11/30/2019    Cancer of breast 2014    Chest pain 2009     stress nuclear    Cholelithiasis 2019    Surgery    Diabetes mellitus     TYPE 2    Diarrhea     SINCE GALLBLADDER REMOVAL    GERD (gastroesophageal reflux disease)     taking Pantoprazole    Hernia of abdominal cavity     History of medical problems 2016    Acoustic Neuroma brain tumor    HL (hearing loss)     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  NO PROBLEMS AT THIS TIME, BUT AVOIDS IBUPROFEN.    MRSA (methicillin resistant Staphylococcus aureus)     infection of right breast expander; bilateral removal of expanders    Obesity     Renal insufficiency     Schwannoma 06/2016    left translabyrinthine craniotomy    Wears glasses     RX    Wears hearing aid     BILATERAL       Past Surgical History:   Procedure Laterality Date    BRAIN SURGERY  2016    Acoustic Neuroma tumor remival left side    BREAST RECONSTRUCTION, BREAST TISSUE EXPANDER INSERTION Bilateral 07/2013    BREAST SURGERY  2014    BREAST TISSUE EXPANDER REMOVAL INSERTION OF IMPLANT Bilateral     bilateral removal of expanders    CHOLECYSTECTOMY N/A 12/01/2019    Procedure: CHOLECYSTECTOMY LAPAROSCOPIC;  Surgeon: Rafiq Angelo MD;  Location: [facility] OR;  Service: General    COLONOSCOPY  2018    CRANIOTOMY FOR TUMOR  06/2016    Left Translabyrithine craniotomy    HYSTERECTOMY  1986    MASTECTOMY Bilateral 2014    complicated by MRSA wound infection on right requiring removal of expander.    TONSILLECTOMY      VENTRAL/INCISIONAL HERNIA REPAIR N/A 10/19/2020    Procedure: OPEN INCISIONAL HERNIA REPAIR WITH MESH;  Surgeon: Rafiq Angelo MD;  Location: [facility] OR;  Service: General;  Laterality: N/A;       Family History   Problem Relation Age of Onset    Hypertension Mother     Diabetes Mother     Migraines Mother     Arthritis Mother     Thyroid disease Mother      Diabetes Sister     Heart disease Sister     Hypertension Sister     COPD Sister     Kidney disease Sister     Hypertension Brother     Heart disease Brother             Diabetes Son     Heart disease Father     Vision loss Maternal Grandmother        Social History     Socioeconomic History    Marital status:    Tobacco Use    Smoking status: Never    Smokeless tobacco: Never   Vaping Use    Vaping status: Never Used   Substance and Sexual Activity    Alcohol use: No    Drug use: No    Sexual activity: Not Currently     Birth control/protection: None       No Known Allergies      Current Outpatient Medications:     acetaminophen (TYLENOL) 325 MG tablet, Take 2 tablets by mouth Every 6 (Six) Hours As Needed for Mild Pain  or Moderate Pain  (Do not exceed 4,000mg in 24 hours)., Disp: , Rfl:     albuterol sulfate  (90 Base) MCG/ACT inhaler, Inhale 2 puffs Every 4 (Four) Hours As Needed for Wheezing., Disp: 18 g, Rfl: 2    Ascorbic Acid (VITAMIN C) 500 MG capsule, Take 1 capsule by mouth 2 (Two) Times a Day., Disp: , Rfl:     Calcium Carb-Cholecalciferol (CALCIUM 600-D PO), Take  by mouth 2 (Two) Times a Day., Disp: , Rfl:     cholecalciferol (VITAMIN D3) 1000 units tablet, Take 2 tablets by mouth Every Morning., Disp: , Rfl:     escitalopram (LEXAPRO) 10 MG tablet, TAKE 1 TABLET BY MOUTH EVERY DAY, Disp: 90 tablet, Rfl: 1    loratadine (CLARITIN) 10 MG tablet, Take 1 tablet by mouth Daily., Disp: , Rfl:     losartan (COZAAR) 100 MG tablet, TAKE 1 TABLET BY MOUTH EVERY DAY, Disp: 90 tablet, Rfl: 1    metFORMIN (GLUCOPHAGE) 1000 MG tablet, Take 0.5 tablets by mouth 2 (Two) Times a Day With Meals., Disp: , Rfl:     Multiple Vitamins-Minerals (MULTIVITAMIN ADULT PO), Take 1 tablet by mouth Daily., Disp: , Rfl:     ondansetron (Zofran) 4 MG tablet, Take 1 tablet by mouth Every 8 (Eight) Hours As Needed for Nausea., Disp: 30 tablet, Rfl: 2    pantoprazole (PROTONIX) 40 MG EC tablet, TAKE 1 TABLET BY  "MOUTH TWICE A DAY, Disp: 180 tablet, Rfl: 1    rosuvastatin (CRESTOR) 20 MG tablet, TAKE 1 TABLET BY MOUTH EVERY DAY, Disp: 90 tablet, Rfl: 3    Semaglutide, 2 MG/DOSE, (Ozempic, 2 MG/DOSE,) 8 MG/3ML solution pen-injector, Inject 2 mg under the skin into the appropriate area as directed 1 (One) Time Per Week., Disp: 3 mL, Rfl: 5    amoxicillin-clavulanate (AUGMENTIN) 875-125 MG per tablet, Take 1 tablet by mouth 2 (Two) Times a Day., Disp: 14 tablet, Rfl: 0    Hydrocod Rajesh-Chlorphe Rajesh ER (TUSSIONEX PENNKINETIC) 10-8 MG/5ML ER suspension, Take 5 mL by mouth Every 12 (Twelve) Hours As Needed for Cough., Disp: 180 mL, Rfl: 0    Review of Systems   Constitutional: Negative.    Respiratory: Negative.  Negative for chest tightness and shortness of breath.    Cardiovascular: Negative.  Negative for chest pain.   Gastrointestinal:  Negative for abdominal pain, blood in stool, constipation and diarrhea.        Vital Signs  Vitals:    02/14/25 1257   BP: 112/82   BP Location: Left arm   Patient Position: Sitting   Cuff Size: Adult   Pulse: 98   Temp: 98.2 °F (36.8 °C)   TempSrc: Infrared   SpO2: 98%   Weight: 81.7 kg (180 lb 3.2 oz)   Height: 163 cm (64.17\")   PainSc: 0-No pain       Physical Exam  Vitals reviewed.   Constitutional:       Appearance: Normal appearance. She is well-developed.   HENT:      Head: Normocephalic and atraumatic.   Neck:      Thyroid: No thyromegaly.      Vascular: No carotid bruit.   Cardiovascular:      Rate and Rhythm: Normal rate and regular rhythm.      Heart sounds: Normal heart sounds. No murmur heard.     No friction rub. No gallop.   Pulmonary:      Effort: Pulmonary effort is normal.      Breath sounds: Normal breath sounds.   Musculoskeletal:      Cervical back: Normal range of motion and neck supple.      Right lower leg: No edema.      Left lower leg: No edema.   Lymphadenopathy:      Cervical: No cervical adenopathy.   Skin:     General: Skin is warm and dry.   Neurological:      " Mental Status: She is alert and oriented to person, place, and time.      Cranial Nerves: No cranial nerve deficit.   Psychiatric:         Mood and Affect: Mood normal.         Behavior: Behavior normal.        Physical Exam  The right ear shows mild inflammation.  The lungs sound clear.    Vital Signs  The patient's blood pressure is 112/82.    Procedures    ACE III MINI        Results  Laboratory Studies  Tested negative for influenza, COVID-19, and strep.           Assessment/Plan:    Diagnoses and all orders for this visit:    1. Acute non-recurrent maxillary sinusitis (Primary)  -     POCT SARS-CoV-2 + Flu Antigen B[place]TON    2. Subacute cough  -     Discontinue: Hydrocod Rajesh-Chlorphe Rajesh ER (TUSSIONEX PENNKINETIC) 10-8 MG/5ML ER suspension; Take 5 mL by mouth Every 12 (Twelve) Hours As Needed for Cough.  Dispense: 180 mL; Refill: 0  -     POCT SARS-CoV-2 + Flu Antigen B[place]TON    Other orders  -     amoxicillin-clavulanate (AUGMENTIN) 875-125 MG per tablet; Take 1 tablet by mouth 2 (Two) Times a Day.  Dispense: 14 tablet; Refill: 0      Assessment & Plan  1. Acute maxillary sinusitis.  This condition is likely secondary to a previous viral infection, now progressing into the second week. Symptoms include cough, nasal congestion, and yellowish mucus. The right ear shows slight inflammation, but the chest is clear. She is prescribed Augmentin 875 mg twice a day and Tussionex for nighttime cough.    2. Hypotension.  Last week, she experienced low blood pressure readings of 89/72 mmHg and 91/71 mmHg, which later normalized to 107/78 mmHg. Today's reading is 112/82 mmHg. She is advised to withhold her losartan if her systolic blood pressure drops below 100 mmHg and to resume the medication once her systolic blood pressure rises to 120 or 130 mmHg.    Follow-up  The patient will follow up as previously scheduled.      Plan of care reviewed with patient at the conclusion of today's visit. Education was provided regarding  diagnosis, management, and any prescribed or recommended OTC medications.Patient verbalizes understanding of and agreement with management plan.     Patient or patient representative verbalized consent for the use of Ambient Listening during the visit with  Domingo Sood MD for chart documentation. 2/22/2025  14:30 SHERIE Sood MD

## 2025-02-17 DIAGNOSIS — R05.2 SUBACUTE COUGH: ICD-10-CM

## 2025-02-17 RX ORDER — HYDROCODONE POLISTIREX AND CHLORPHENIRAMINE POLISTIREX 10; 8 MG/5ML; MG/5ML
5 SUSPENSION, EXTENDED RELEASE ORAL EVERY 12 HOURS PRN
Qty: 180 ML | Refills: 0 | OUTPATIENT
Start: 2025-02-17

## 2025-02-17 RX ORDER — HYDROCODONE POLISTIREX AND CHLORPHENIRAMINE POLISTIREX 10; 8 MG/5ML; MG/5ML
5 SUSPENSION, EXTENDED RELEASE ORAL EVERY 12 HOURS PRN
Qty: 180 ML | Refills: 0 | Status: SHIPPED | OUTPATIENT
Start: 2025-02-17

## 2025-02-17 NOTE — TELEPHONE ENCOUNTER
"  Caller: Wallace Dukes \"Barbara\"    Relationship to patient: Self    Best call back number:[phone]     Patient is needing: PLEASE CALL IN COUGH MEDICATION, PATIENT IS VERY ILL.       [facility]/pharmacy #[id number] - [place], [place] - [address] - [phone]  - [phone] FX     "

## 2025-02-18 ENCOUNTER — TELEPHONE (OUTPATIENT)
Dept: INTERNAL MEDICINE | Facility: CLINIC | Age: 70
End: 2025-02-18
Payer: MEDICARE

## 2025-02-18 NOTE — TELEPHONE ENCOUNTER
Called patient to advise that I received a call from Martin a prior authorization rep [facility] regarding more questions to the request she had put in for the medication Tussionex. I was able to answer the questions asked and was advised by Martin that the turnaround time for a response will be 72 hrs. Patient verbally understood that once I have an answer back I will call and let her know a well. The Ref number is # [id number]

## 2025-02-19 ENCOUNTER — TELEPHONE (OUTPATIENT)
Dept: INTERNAL MEDICINE | Facility: CLINIC | Age: 70
End: 2025-02-19
Payer: MEDICARE

## 2025-02-19 NOTE — TELEPHONE ENCOUNTER
Advised patient via my chart that the insurance company has approved the medication Hydrocodone-Chlorpen ER Susp. The approval is from 01/01/25 to 02/18/26    A copy of this letter will be scanned into the chart

## 2025-02-25 RX ORDER — METFORMIN HYDROCHLORIDE 500 MG/1
500 TABLET, EXTENDED RELEASE ORAL
Qty: 90 TABLET | Refills: 3 | Status: SHIPPED | OUTPATIENT
Start: 2025-02-25

## 2025-03-12 RX ORDER — PANTOPRAZOLE SODIUM 40 MG/1
40 TABLET, DELAYED RELEASE ORAL 2 TIMES DAILY
Qty: 180 TABLET | Refills: 1 | Status: SHIPPED | OUTPATIENT
Start: 2025-03-12

## 2025-03-20 RX ORDER — LOSARTAN POTASSIUM 100 MG/1
100 TABLET ORAL DAILY
Qty: 90 TABLET | Refills: 3 | Status: SHIPPED | OUTPATIENT
Start: 2025-03-20

## 2025-03-20 NOTE — TELEPHONE ENCOUNTER
Rx Refill Note  Requested Prescriptions     Pending Prescriptions Disp Refills    losartan (COZAAR) 100 MG tablet [Pharmacy Med Name: LOSARTAN POTASSIUM 100 MG TAB] 90 tablet 1     Sig: TAKE 1 TABLET BY MOUTH EVERY DAY      Last office visit with prescribing clinician: 2/14/2025   Last telemedicine visit with prescribing clinician: Visit date not found   Next office visit with prescribing clinician: 6/6/2025                         Would you like a call back once the refill request has been completed: [] Yes [x] No    If the office needs to give you a call back, can they leave a voicemail: [x] Yes [] No    Ava Bustillo MA  03/20/25, 08:08 EDT

## 2025-04-25 ENCOUNTER — EXTERNAL PBMM DATA (OUTPATIENT)
Dept: PHARMACY | Facility: OTHER | Age: 70
End: 2025-04-25
Payer: MEDICARE

## 2025-04-28 ENCOUNTER — TRANSCRIBE ORDERS (OUTPATIENT)
Dept: LAB | Facility: HOSPITAL | Age: 70
End: 2025-04-28
Payer: MEDICARE

## 2025-04-28 ENCOUNTER — LAB (OUTPATIENT)
Dept: LAB | Facility: HOSPITAL | Age: 70
End: 2025-04-28
Payer: MEDICARE

## 2025-04-28 DIAGNOSIS — N18.32 STAGE 3B CHRONIC KIDNEY DISEASE: ICD-10-CM

## 2025-04-28 DIAGNOSIS — N18.32 STAGE 3B CHRONIC KIDNEY DISEASE: Primary | ICD-10-CM

## 2025-04-28 LAB
25(OH)D3 SERPL-MCNC: 66.7 NG/ML (ref 30–100)
ALBUMIN SERPL-MCNC: 4 G/DL (ref 3.5–5.2)
ANION GAP SERPL CALCULATED.3IONS-SCNC: 9 MMOL/L (ref 5–15)
BILIRUB UR QL STRIP: NEGATIVE
BUN SERPL-MCNC: 18 MG/DL (ref 8–23)
BUN/CREAT SERPL: 14.3 (ref 7–25)
CALCIUM SPEC-SCNC: 9.5 MG/DL (ref 8.6–10.5)
CHLORIDE SERPL-SCNC: 102 MMOL/L (ref 98–107)
CLARITY UR: CLEAR
CO2 SERPL-SCNC: 26 MMOL/L (ref 22–29)
COLOR UR: YELLOW
CREAT SERPL-MCNC: 1.26 MG/DL (ref 0.57–1)
CREAT UR-MCNC: 97.7 MG/DL
DEPRECATED RDW RBC AUTO: 40.7 FL (ref 37–54)
EGFRCR SERPLBLD CKD-EPI 2021: 46 ML/MIN/1.73
ERYTHROCYTE [DISTWIDTH] IN BLOOD BY AUTOMATED COUNT: 12.5 % (ref 12.3–15.4)
GLUCOSE SERPL-MCNC: 84 MG/DL (ref 65–99)
GLUCOSE UR STRIP-MCNC: NEGATIVE MG/DL
HCT VFR BLD AUTO: 36.2 % (ref 34–46.6)
HGB BLD-MCNC: 12.4 G/DL (ref 12–15.9)
HGB UR QL STRIP.AUTO: NEGATIVE
KETONES UR QL STRIP: NEGATIVE
LEUKOCYTE ESTERASE UR QL STRIP.AUTO: NEGATIVE
MCH RBC QN AUTO: 30.9 PG (ref 26.6–33)
MCHC RBC AUTO-ENTMCNC: 34.3 G/DL (ref 31.5–35.7)
MCV RBC AUTO: 90.3 FL (ref 79–97)
NITRITE UR QL STRIP: NEGATIVE
PH UR STRIP.AUTO: 6.5 [PH] (ref 5–8)
PHOSPHATE SERPL-MCNC: 3.7 MG/DL (ref 2.5–4.5)
PLATELET # BLD AUTO: 373 10*3/MM3 (ref 140–450)
PMV BLD AUTO: 9.2 FL (ref 6–12)
POTASSIUM SERPL-SCNC: 5 MMOL/L (ref 3.5–5.2)
PROT ?TM UR-MCNC: 8.3 MG/DL
PROT UR QL STRIP: NEGATIVE
PROT/CREAT UR: 85 MG/G CREA (ref 0–200)
PTH-INTACT SERPL-MCNC: 45.8 PG/ML (ref 15–65)
RBC # BLD AUTO: 4.01 10*6/MM3 (ref 3.77–5.28)
SODIUM SERPL-SCNC: 137 MMOL/L (ref 136–145)
SP GR UR STRIP: 1.01 (ref 1–1.03)
UROBILINOGEN UR QL STRIP: NORMAL
WBC NRBC COR # BLD AUTO: 7.6 10*3/MM3 (ref 3.4–10.8)

## 2025-04-28 PROCEDURE — 83970 ASSAY OF PARATHORMONE: CPT

## 2025-04-28 PROCEDURE — 82306 VITAMIN D 25 HYDROXY: CPT

## 2025-04-28 PROCEDURE — 84156 ASSAY OF PROTEIN URINE: CPT

## 2025-04-28 PROCEDURE — 80069 RENAL FUNCTION PANEL: CPT

## 2025-04-28 PROCEDURE — 81003 URINALYSIS AUTO W/O SCOPE: CPT

## 2025-04-28 PROCEDURE — 85027 COMPLETE CBC AUTOMATED: CPT

## 2025-04-28 PROCEDURE — 36415 COLL VENOUS BLD VENIPUNCTURE: CPT

## 2025-04-28 PROCEDURE — 82570 ASSAY OF URINE CREATININE: CPT

## 2025-04-28 RX ORDER — ESCITALOPRAM OXALATE 10 MG/1
10 TABLET ORAL DAILY
Qty: 90 TABLET | Refills: 1 | Status: SHIPPED | OUTPATIENT
Start: 2025-04-28

## 2025-04-28 NOTE — TELEPHONE ENCOUNTER
Rx Refill Note  Requested Prescriptions     Pending Prescriptions Disp Refills    escitalopram (LEXAPRO) 10 MG tablet [Pharmacy Med Name: ESCITALOPRAM 10 MG TABLET] 90 tablet 1     Sig: TAKE 1 TABLET BY MOUTH EVERY DAY      Last office visit with prescribing clinician: 2/14/2025   Last telemedicine visit with prescribing clinician: Visit date not found   Next office visit with prescribing clinician: 6/6/2025                         Would you like a call back once the refill request has been completed: [] Yes [] No    If the office needs to give you a call back, can they leave a voicemail: [] Yes [] No    Glenis Figueroa MA  04/28/25, 08:55 EDT

## 2025-06-02 DIAGNOSIS — E11.42 DIABETIC POLYNEUROPATHY ASSOCIATED WITH TYPE 2 DIABETES MELLITUS: Primary | ICD-10-CM

## 2025-06-02 DIAGNOSIS — E78.2 MIXED HYPERLIPIDEMIA: ICD-10-CM

## 2025-06-04 ENCOUNTER — LAB (OUTPATIENT)
Dept: LAB | Facility: HOSPITAL | Age: 70
End: 2025-06-04
Payer: MEDICARE

## 2025-06-04 DIAGNOSIS — E78.2 MIXED HYPERLIPIDEMIA: ICD-10-CM

## 2025-06-04 DIAGNOSIS — E11.42 DIABETIC POLYNEUROPATHY ASSOCIATED WITH TYPE 2 DIABETES MELLITUS: ICD-10-CM

## 2025-06-04 LAB
CHOLEST SERPL-MCNC: 168 MG/DL (ref 0–200)
HBA1C MFR BLD: 5.7 % (ref 4.8–5.6)
HDLC SERPL-MCNC: 55 MG/DL (ref 40–60)
LDLC SERPL CALC-MCNC: 87 MG/DL (ref 0–100)
LDLC/HDLC SERPL: 1.51 {RATIO}
TRIGL SERPL-MCNC: 151 MG/DL (ref 0–150)
VLDLC SERPL-MCNC: 26 MG/DL (ref 5–40)

## 2025-06-04 PROCEDURE — 83036 HEMOGLOBIN GLYCOSYLATED A1C: CPT

## 2025-06-04 PROCEDURE — 36415 COLL VENOUS BLD VENIPUNCTURE: CPT

## 2025-06-04 PROCEDURE — 82172 ASSAY OF APOLIPOPROTEIN: CPT

## 2025-06-04 PROCEDURE — 80061 LIPID PANEL: CPT

## 2025-06-06 ENCOUNTER — OFFICE VISIT (OUTPATIENT)
Dept: INTERNAL MEDICINE | Facility: CLINIC | Age: 70
End: 2025-06-06
Payer: MEDICARE

## 2025-06-06 VITALS
DIASTOLIC BLOOD PRESSURE: 84 MMHG | SYSTOLIC BLOOD PRESSURE: 130 MMHG | HEIGHT: 64 IN | TEMPERATURE: 98 F | WEIGHT: 179 LBS | HEART RATE: 66 BPM | OXYGEN SATURATION: 96 % | BODY MASS INDEX: 30.56 KG/M2

## 2025-06-06 DIAGNOSIS — I10 PRIMARY HYPERTENSION: ICD-10-CM

## 2025-06-06 DIAGNOSIS — L98.9 SKIN LESION: ICD-10-CM

## 2025-06-06 DIAGNOSIS — N18.32 STAGE 3B CHRONIC KIDNEY DISEASE: ICD-10-CM

## 2025-06-06 DIAGNOSIS — E78.2 MIXED HYPERLIPIDEMIA: ICD-10-CM

## 2025-06-06 DIAGNOSIS — E11.21 DIABETIC NEPHROPATHY ASSOCIATED WITH TYPE 2 DIABETES MELLITUS: Primary | ICD-10-CM

## 2025-06-06 DIAGNOSIS — F32.4 MAJOR DEPRESSIVE DISORDER WITH SINGLE EPISODE, IN PARTIAL REMISSION: ICD-10-CM

## 2025-06-06 NOTE — PROGRESS NOTES
Answers submitted by the patient for this visit:  Problem not listed (Submitted on 6/3/2025)  Chief Complaint: Other medical problem  Reason for appointment: 6 month checkup  anorexia: No  joint pain: No  change in stool: No  headaches: No  joint swelling: No  vertigo: No  visual change: No  [facility]     Wallace Dukes  1955   [id number]      Patient Care Team:  Domingo Sood MD as PCP - General  Domingo Sood MD as PCP - Family Medicine    Chief Complaint::   Chief Complaint   Patient presents with    Primary Care Follow-Up    Hypertension    Hyperlipidemia    Diabetes        HPI  History of Present Illness  The patient is here for a follow-up of diabetes, hypertension, hyperlipidemia, chronic kidney disease, and depression.    She reports an overall sense of well-being and mentions that her weight has been stable, fluctuating between 175 to 180 pounds. She maintains mobility and is able to engage in activities with her grandchildren. She believes she has an adequate supply of her prescribed medications.    She has observed a lesion on her scalp that appears to be increasing in size. She has not previously consulted a dermatologist for this issue.    She expresses concern about the potential recurrence of breast cancer, given her friend's recent experience with the disease. She underwent a double mastectomy in the past and was subsequently discharged from her oncologist's care.    She has been prescribed Ozempic and notes an improvement in her GFR, which she attributes to this medication. Her GFR was previously recorded as 46, but a subsequent home test conducted by her insurance company indicated a GFR of 53.    PAST SURGICAL HISTORY:  Double mastectomy      Chronic Conditions:      Patient Active Problem List   Diagnosis    GERD (gastroesophageal reflux disease)    Diabetic neuropathy associated with type 2 diabetes mellitus    Primary hypertension    Venous (peripheral)  insufficiency    Mixed hyperlipidemia    Allergic rhinitis    Morbid obesity    Right elbow pain    Type 2 diabetes mellitus with diabetic polyneuropathy, without long-term current use of insulin    Major depressive disorder with single episode, in partial remission    Diabetic nephropathy associated with type 2 diabetes mellitus    Stage 3b chronic kidney disease    Personal history of breast cancer        Past Medical History:   Diagnosis Date    Acute calculous cholecystitis 11/30/2019    Cancer of breast 2014    Chest pain 2009    stress nuclear    Cholelithiasis 2019    Surgery    Diabetes mellitus     TYPE 2    Diarrhea     SINCE GALLBLADDER REMOVAL    GERD (gastroesophageal reflux disease)     taking Pantoprazole    Hernia of abdominal cavity     History of medical problems 2016    Acoustic Neuroma brain tumor    HL (hearing loss)     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  NO PROBLEMS AT THIS TIME, BUT AVOIDS IBUPROFEN.    MRSA (methicillin resistant Staphylococcus aureus)     infection of right breast expander; bilateral removal of expanders    Obesity     Renal insufficiency     Schwannoma 06/2016    left translabyrinthine craniotomy    Wears glasses     RX    Wears hearing aid     BILATERAL       Past Surgical History:   Procedure Laterality Date    BRAIN SURGERY  2016    Acoustic Neuroma tumor remival left side    BREAST RECONSTRUCTION, BREAST TISSUE EXPANDER INSERTION Bilateral 07/2013    BREAST SURGERY  2014    BREAST TISSUE EXPANDER REMOVAL INSERTION OF IMPLANT Bilateral     bilateral removal of expanders    CHOLECYSTECTOMY N/A 12/01/2019    Procedure: CHOLECYSTECTOMY LAPAROSCOPIC;  Surgeon: Rafiq Angelo MD;  Location: [facility];  Service: General    COLONOSCOPY  2018    CRANIOTOMY FOR TUMOR  06/2016    Left  Translabyrithine craniotomy    HERNIA REPAIR  2019    HYSTERECTOMY  1986    MASTECTOMY Bilateral 2014    complicated by MRSA wound infection on right requiring removal of expander.    TONSILLECTOMY      VENTRAL/INCISIONAL HERNIA REPAIR N/A 10/19/2020    Procedure: OPEN INCISIONAL HERNIA REPAIR WITH MESH;  Surgeon: Rafiq Angelo MD;  Location: [facility];  Service: General;  Laterality: N/A;       Family History   Problem Relation Age of Onset    Hypertension Mother     Diabetes Mother     Migraines Mother     Arthritis Mother     Thyroid disease Mother     Diabetes Sister     Heart disease Sister     Hypertension Sister     COPD Sister     Kidney disease Sister     Hypertension Brother     Heart disease Brother             Diabetes Son     Heart disease Father     Vision loss Maternal Grandmother        Social History     Socioeconomic History    Marital status:    Tobacco Use    Smoking status: Never    Smokeless tobacco: Never   Vaping Use    Vaping status: Never Used   Substance and Sexual Activity    Alcohol use: No    Drug use: No    Sexual activity: Not Currently     Birth control/protection: None       No Known Allergies      Current Outpatient Medications:     acetaminophen (TYLENOL) 325 MG tablet, Take 2 tablets by mouth Every 6 (Six) Hours As Needed for Mild Pain  or Moderate Pain  (Do not exceed 4,000mg in 24 hours)., Disp: , Rfl:     albuterol sulfate  (90 Base) MCG/ACT inhaler, Inhale 2 puffs Every 4 (Four) Hours As Needed for Wheezing., Disp: 18 g, Rfl: 2    Ascorbic Acid (VITAMIN C) 500 MG capsule, Take 1 capsule by mouth 2 (Two) Times a Day., Disp: , Rfl:     Calcium Carb-Cholecalciferol (CALCIUM 600-D PO), Take  by mouth 2 (Two) Times a Day., Disp: , Rfl:     cholecalciferol (VITAMIN D3) 1000 units tablet, Take 2 tablets by mouth Every Morning., Disp: , Rfl:     escitalopram (LEXAPRO) 10 MG tablet, TAKE 1 TABLET BY MOUTH EVERY DAY, Disp: 90 tablet, Rfl: 1    loratadine  "(CLARITIN) 10 MG tablet, Take 1 tablet by mouth Daily., Disp: , Rfl:     losartan (COZAAR) 100 MG tablet, TAKE 1 TABLET BY MOUTH EVERY DAY, Disp: 90 tablet, Rfl: 3    metFORMIN ER (GLUCOPHAGE-XR) 500 MG 24 hr tablet, Take 1 tablet by mouth Daily With Breakfast., Disp: 90 tablet, Rfl: 3    Multiple Vitamins-Minerals (MULTIVITAMIN ADULT PO), Take 1 tablet by mouth Daily., Disp: , Rfl:     ondansetron (Zofran) 4 MG tablet, Take 1 tablet by mouth Every 8 (Eight) Hours As Needed for Nausea., Disp: 30 tablet, Rfl: 2    pantoprazole (PROTONIX) 40 MG EC tablet, TAKE 1 TABLET BY MOUTH TWICE A DAY, Disp: 180 tablet, Rfl: 1    rosuvastatin (CRESTOR) 20 MG tablet, TAKE 1 TABLET BY MOUTH EVERY DAY, Disp: 90 tablet, Rfl: 3    Semaglutide, 2 MG/DOSE, (Ozempic, 2 MG/DOSE,) 8 MG/3ML solution pen-injector, Inject 2 mg under the skin into the appropriate area as directed 1 (One) Time Per Week., Disp: 3 mL, Rfl: 5    Review of Systems   Constitutional:  Negative for chills, diaphoresis, fatigue and fever.   HENT:  Negative for congestion, sore throat and swollen glands.    Respiratory:  Negative for cough.    Cardiovascular:  Negative for chest pain.   Gastrointestinal:  Negative for abdominal pain, nausea and vomiting.   Genitourinary:  Negative for dysuria.   Musculoskeletal:  Negative for myalgias and neck pain.   Skin:  Negative for rash.   Neurological:  Negative for weakness and numbness.        Vital Signs  Vitals:    06/06/25 1024   BP: 130/84   BP Location: Left arm   Patient Position: Sitting   Cuff Size: Adult   Pulse: 66   Temp: 98 °F (36.7 °C)   TempSrc: Infrared   SpO2: 96%   Weight: 81.2 kg (179 lb)   Height: 163 cm (64.17\")   PainSc: 0-No pain       Physical Exam  Vitals reviewed.   Constitutional:       Appearance: Normal appearance. She is well-developed.   HENT:      Head: Normocephalic and atraumatic.   Neck:      Thyroid: No thyromegaly.      Vascular: No carotid bruit.   Cardiovascular:      Rate and Rhythm: " Normal rate and regular rhythm.      Heart sounds: Normal heart sounds. No murmur heard.     No friction rub. No gallop.   Pulmonary:      Effort: Pulmonary effort is normal.      Breath sounds: Normal breath sounds.   Musculoskeletal:      Cervical back: Neck supple.      Right lower leg: No edema.      Left lower leg: No edema.   Lymphadenopathy:      Cervical: No cervical adenopathy.   Skin:     General: Skin is dry.   Neurological:      Mental Status: She is alert and oriented to person, place, and time.      Cranial Nerves: No cranial nerve deficit.   Psychiatric:         Mood and Affect: Mood normal.         Behavior: Behavior normal.        Physical Exam  Head: Seborrheic keratosis noted on the scalp    Procedures    ACE III MINI        Results  Labs   - A1c: 5.7%   - Triglycerides: One point higher than last time   - Fasting blood sugar: 84   - GFR: 46   - GFR (in-home test): 53           Assessment/Plan:    Diagnoses and all orders for this visit:    1. Diabetic nephropathy associated with type 2 diabetes mellitus (Primary)    2. Primary hypertension    3. Mixed hyperlipidemia    4. Stage 3b chronic kidney disease    5. Major depressive disorder with single episode, in partial remission    6. Skin lesion  -     Ambulatory Referral to Dermatology      Assessment & Plan  1. Diabetes.  - A1c remains well controlled at 5.7%, consistent with the previous year.  - Physical exam findings show stable A1c levels.  - Discussion included review of lab results and the effectiveness of current medications.  - Continue current regimen of semaglutide and metformin.    2. Chronic kidney disease.  - GFR has shown stability, with recent readings at 46 and 53 from an in-home test; a year ago, it was 36.  - Physical exam findings and test results indicate stable kidney function.  - Discussion included review of GFR trends and potential benefits of semaglutide.  - Continue follow-up with nephrology.    3. Hypertension.  - Blood  pressure is well controlled.  - Physical exam findings confirm stable blood pressure.  - Discussion included review of current medication effectiveness.  - Continue current regimen of losartan.    4. Hyperlipidemia.  - Lipid profile is well controlled.  - Physical exam findings confirm stable lipid levels.  - Discussion included review of lab results and medication effectiveness.  - Continue current regimen of rosuvastatin.    5. Depression.  - Mood is well compensated, and depression appears to be in remission.  - Physical exam findings confirm stable mood.  - Discussion included review of current medication effectiveness.  - Continue current regimen of escitalopram.    6. Seborrheic keratosis.  - Lesion on head appears to be raising more.  - Physical exam findings indicate it does not look like skin cancer.  - Discussion included referral to dermatology for further evaluation.  - Referred to [facility] for further evaluation.    7. Breast cancer.  - History of double mastectomy; discharged from oncologist's care.  - Physical exam findings confirm no current issues.  - Discussion included reassurance and no need for additional testing at this time.  - No additional testing required.    Follow-up  The patient will follow up in 6 months.      Plan of care reviewed with patient at the conclusion of today's visit. Education was provided regarding diagnosis, management, and any prescribed or recommended OTC medications.Patient verbalizes understanding of and agreement with management plan.     Patient or patient representative verbalized consent for the use of Ambient Listening during the visit with  Domingo Sood MD for chart documentation. 6/7/2025  15:49 EDT        Domingo Sood MD

## 2025-06-07 LAB — APO B SERPL-MCNC: 83 MG/DL

## (undated) DEVICE — 3M™ STERI-STRIP™ REINFORCED ADHESIVE SKIN CLOSURES, R1547, 1/2 IN X 4 IN (12 MM X 100 MM), 6 STRIPS/ENVELOPE: Brand: 3M™ STERI-STRIP™

## (undated) DEVICE — ENDOPATH XCEL BLADELESS TROCARS WITH STABILITY SLEEVES: Brand: ENDOPATH XCEL

## (undated) DEVICE — MARYLAND JAW LAPAROSCOPIC SEALER/DIVIDER COATED: Brand: LIGASURE

## (undated) DEVICE — SUT VIC 0 SH 27IN J418H

## (undated) DEVICE — ADHS LIQ MASTISOL 2/3ML

## (undated) DEVICE — GLV SURG PREMIERPRO MIC LTX PF SZ7.5 BRN

## (undated) DEVICE — ANTIBACTERIAL UNDYED BRAIDED (POLYGLACTIN 910), SYNTHETIC ABSORBABLE SUTURE: Brand: COATED VICRYL

## (undated) DEVICE — APPL CHLORAPREP TINTED 26ML TEAL

## (undated) DEVICE — SYR LUERLOK 20CC BX/50

## (undated) DEVICE — SUT MNCRYL PLS ANTIB UD 4/0 PS2 18IN

## (undated) DEVICE — ENDOPATH XCEL BLUNT TIP TROCARS WITH SMOOTH SLEEVES: Brand: ENDOPATH XCEL

## (undated) DEVICE — DRSNG SURESITE WNDW 4X4.5

## (undated) DEVICE — 3M™ IOBAN™ 2 ANTIMICROBIAL INCISE DRAPE 6650EZ: Brand: IOBAN™ 2

## (undated) DEVICE — 2, DISPOSABLE SUCTION/IRRIGATOR WITHOUT DISPOSABLE TIP: Brand: STRYKEFLOW

## (undated) DEVICE — DRSNG TRANSPARENT 2IN X 3IN

## (undated) DEVICE — DRSNG TELFA PAD NONADH STR 1S 3X8IN

## (undated) DEVICE — [HIGH FLOW INSUFFLATOR,  DO NOT USE IF PACKAGE IS DAMAGED,  KEEP DRY,  KEEP AWAY FROM SUNLIGHT,  PROTECT FROM HEAT AND RADIOACTIVE SOURCES.]: Brand: PNEUMOSURE

## (undated) DEVICE — CONTAINER,SPECIMEN,OR STERILE,4OZ: Brand: MEDLINE

## (undated) DEVICE — BLD SCLPL SS NO24 STRL

## (undated) DEVICE — CVR HNDL LIGHT RIGID

## (undated) DEVICE — LEX GENERAL ABDOMINAL SPLIT: Brand: MEDLINE INDUSTRIES, INC.

## (undated) DEVICE — SCOPE WARMER THAT PRE-WARMS MULTIPLE LAPAROSCOPES.: Brand: JOSNOE MEDICAL INC

## (undated) DEVICE — COVER,LIGHT HANDLE,FLX,1/PK: Brand: MEDLINE INDUSTRIES, INC.

## (undated) DEVICE — BNDR ABD PREMIUM/UNIV 10IN 27TO48IN

## (undated) DEVICE — INTENDED FOR TISSUE SEPARATION, AND OTHER PROCEDURES THAT REQUIRE A SHARP SURGICAL BLADE TO PUNCTURE OR CUT.: Brand: BARD-PARKER ® STAINLESS STEEL BLADES

## (undated) DEVICE — PK LAP LASR CHOLE 10

## (undated) DEVICE — SUT VIC 0 UR6 27IN VCP603H

## (undated) DEVICE — ENDOPOUCH RETRIEVER SPECIMEN RETRIEVAL BAGS: Brand: ENDOPOUCH RETRIEVER

## (undated) DEVICE — APPL CHLORAPREP W/TINT 26ML BLU

## (undated) DEVICE — GLV SURG PREMIERPRO MIC LTX PF SZ8 BRN

## (undated) DEVICE — SUT VIC PLS CTD ANTIB 2/0 18IN VCP111G

## (undated) DEVICE — MINI ENDOCUT SCISSOR TIP, DISPOSABLE: Brand: RENEW